# Patient Record
Sex: FEMALE | Race: WHITE | NOT HISPANIC OR LATINO | Employment: OTHER | URBAN - METROPOLITAN AREA
[De-identification: names, ages, dates, MRNs, and addresses within clinical notes are randomized per-mention and may not be internally consistent; named-entity substitution may affect disease eponyms.]

---

## 2017-03-10 DIAGNOSIS — I10 ESSENTIAL HYPERTENSION: ICD-10-CM

## 2017-03-10 DIAGNOSIS — E89.0 POSTSURGICAL HYPOTHYROIDISM: ICD-10-CM

## 2017-03-10 DIAGNOSIS — M17.0 OSTEOARTHRITIS OF BOTH KNEES, UNSPECIFIED OSTEOARTHRITIS TYPE: ICD-10-CM

## 2017-03-10 RX ORDER — LISINOPRIL AND HYDROCHLOROTHIAZIDE 20; 25 MG/1; MG/1
TABLET ORAL
Qty: 90 TABLET | Refills: 0 | Status: SHIPPED | OUTPATIENT
Start: 2017-03-10 | End: 2017-06-19 | Stop reason: SDUPTHER

## 2017-03-10 RX ORDER — LEVOTHYROXINE SODIUM 50 UG/1
TABLET ORAL
Qty: 90 TABLET | Refills: 0 | Status: SHIPPED | OUTPATIENT
Start: 2017-03-10 | End: 2017-06-19 | Stop reason: SDUPTHER

## 2017-03-10 RX ORDER — MELOXICAM 15 MG/1
TABLET ORAL
Qty: 90 TABLET | Refills: 0 | Status: SHIPPED | OUTPATIENT
Start: 2017-03-10 | End: 2017-06-19 | Stop reason: SDUPTHER

## 2017-03-10 NOTE — TELEPHONE ENCOUNTER
Advise patient she is due for fasting labs and WELLNESS EXAM - make appointment.  Patient has portal so you can advise her to make appointment on my chart for the visit.

## 2017-03-10 NOTE — TELEPHONE ENCOUNTER
I spoke with patient on phone.  Advised patient that she must follow up every 6 months for med check.  I made agreement with her that if on her visit, her blood pressure is stable and vss and physical exam normal and no signs of thyroid imbalance, etc - then we can do med check every 6 months and labs only 1 time per year as long as labs are NORMAL at that year check.  Advised patient since last labs in late July 2016 were normal - she can come just for 6 month follow up and no labs - we will do all labs in SEPT. For wellness exam.  Patient is in agreement and will make 6 month follow up appointment.

## 2017-03-10 NOTE — TELEPHONE ENCOUNTER
Spoke with pt said that she can't afford all these labs and that NP Victor Hugo said that pt could do labs once a year,pt said that she don't make a lot of money and she know insurance will cover some but not the 20% that she have to pay and she just got divorce, pt said she had just did labs in December for Victor Hugo and CHEIKH Portillo told pt she did labs in November for another doctor and last did labs for victor hugo was in July, pt said well she will have to go off of all of her medication cause she can't afford the labs. CHEIKH Portillo told pt she will sent message to victor hugo and get back with her.

## 2017-03-29 ENCOUNTER — OFFICE VISIT (OUTPATIENT)
Dept: FAMILY MEDICINE | Facility: CLINIC | Age: 62
End: 2017-03-29
Payer: COMMERCIAL

## 2017-03-29 VITALS
TEMPERATURE: 98 F | SYSTOLIC BLOOD PRESSURE: 120 MMHG | BODY MASS INDEX: 26.74 KG/M2 | HEIGHT: 68 IN | WEIGHT: 176.44 LBS | OXYGEN SATURATION: 99 % | DIASTOLIC BLOOD PRESSURE: 72 MMHG | HEART RATE: 76 BPM

## 2017-03-29 DIAGNOSIS — E78.5 HYPERLIPIDEMIA, UNSPECIFIED HYPERLIPIDEMIA TYPE: ICD-10-CM

## 2017-03-29 DIAGNOSIS — I10 ESSENTIAL HYPERTENSION: Primary | ICD-10-CM

## 2017-03-29 DIAGNOSIS — K21.9 GASTROESOPHAGEAL REFLUX DISEASE WITHOUT ESOPHAGITIS: ICD-10-CM

## 2017-03-29 DIAGNOSIS — F43.0 STRESS REACTION CAUSING MIXED DISTURBANCE OF EMOTION AND CONDUCT: ICD-10-CM

## 2017-03-29 DIAGNOSIS — E89.0 POSTSURGICAL HYPOTHYROIDISM: ICD-10-CM

## 2017-03-29 DIAGNOSIS — M17.0 OSTEOARTHRITIS OF BOTH KNEES, UNSPECIFIED OSTEOARTHRITIS TYPE: ICD-10-CM

## 2017-03-29 PROCEDURE — 99214 OFFICE O/P EST MOD 30 MIN: CPT | Mod: S$GLB,,, | Performed by: NURSE PRACTITIONER

## 2017-03-29 PROCEDURE — 3074F SYST BP LT 130 MM HG: CPT | Mod: S$GLB,,, | Performed by: NURSE PRACTITIONER

## 2017-03-29 PROCEDURE — 3078F DIAST BP <80 MM HG: CPT | Mod: S$GLB,,, | Performed by: NURSE PRACTITIONER

## 2017-03-29 PROCEDURE — 1160F RVW MEDS BY RX/DR IN RCRD: CPT | Mod: S$GLB,,, | Performed by: NURSE PRACTITIONER

## 2017-03-29 PROCEDURE — 99999 PR PBB SHADOW E&M-EST. PATIENT-LVL IV: CPT | Mod: PBBFAC,,, | Performed by: NURSE PRACTITIONER

## 2017-03-29 RX ORDER — ESTRADIOL 1 MG/1
1 TABLET ORAL DAILY
Refills: 6 | COMMUNITY
Start: 2017-03-23 | End: 2019-12-11

## 2017-03-29 NOTE — MR AVS SNAPSHOT
[facility]  [id number] [place] [address]  Gladys ODLAN [address]  Phone: [phone]  Fax: [phone]                  Gudelia Cerrato   3/29/2017 3:00 PM   Office Visit    Description:  Female : 1955   Provider:  Ya Agustin NP   Department:  [facility]           Reason for Visit     Follow-up           Diagnoses this Visit        Comments    Essential hypertension    -  Primary     Postsurgical hypothyroidism         Hyperlipidemia, unspecified hyperlipidemia type         Gastroesophageal reflux disease without esophagitis         Osteoarthritis of both knees, unspecified osteoarthritis type         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      [facility]     [facility] On Call Nurse [facility] Line - / Assistance  Registered nurses in the [facility] On Call Center provide clinical advisement, health education, appointment booking, and other advisory services.  Call for this free service at [phone].             Medications           Message regarding Medications     Verify the changes and/or additions to your medication regime listed below are the same as discussed with your clinician today.  If any of these changes or additions are incorrect, please notify your healthcare provider.        STOP taking these medications     estradiol 50 mg pellet Inject 3 Pellet (150 mg total) into the skin as directed.           Verify that the below list of medications is an accurate representation of the medications you are currently taking.  If none reported, the list may be blank. If incorrect, please contact your healthcare provider. Carry this list with you in case of emergency.           Current Medications     butalbital-acetaminophen-caffeine -40 mg (FIORICET, ESGIC)  "-40 mg per tablet Take 1 tablet by mouth every 4 (four) hours as needed.    CMPD testosterone proprionate 2% in vanicream APPLY topically THREE TIMES WEEKLY    estradiol (ESTRACE) 1 MG tablet Take 1 mg by mouth once daily.    fenofibric acid (FIBRICOR) 135 mg CpDR Take 1 capsule (135 mg total) by mouth once daily.    lisinopril-hydrochlorothiazide (PRINZIDE,ZESTORETIC) 20-25 mg Tab TAKE 1 TABLET DAILY    meloxicam (MOBIC) 15 MG tablet TAKE 1 TABLET DAILY AS NEEDED    omeprazole (PRILOSEC) 40 MG capsule Take 1 capsule (40 mg total) by mouth once daily.    rosuvastatin (CRESTOR) 10 MG tablet Take 1 tablet (10 mg total) by mouth once daily.    SYNTHROID 50 mcg tablet TAKE 1 TABLET DAILY           Clinical Reference Information           Your Vitals Were     BP Pulse Temp Height Weight SpO2    120/72 (BP Location: Right arm, Patient Position: Sitting, BP Method: Manual) 76 97.5 °F (36.4 °C) (Oral) 5' 7.75" (1.721 m) 80 kg (176 lb 6.6 oz) 99%    BMI                27.02 kg/m2          Blood Pressure          Most Recent Value    BP  120/72      Allergies as of 3/29/2017     Percocet [Oxycodone-acetaminophen]    Pravastatin      Immunizations Administered on Date of Encounter - 3/29/2017     None      Language Assistance Services     ATTENTION: Language assistance services are available, free of charge. Please call [phone].      ATENCIÓN: Si habla español, tiene a zepeda disposición servicios gratuitos de asistencia lingüística. Llame al [phone].     [facility] Ý: N?u b?n nói Ti?ng Vi?t, có các d?ch v? h? tr? ngôn ng? mi?n phí dành cho b?n. G?i s? [phone].         [facility] complies with applicable Federal civil rights laws and does not discriminate on the basis of race, color, national origin, age, disability, or sex.        "

## 2017-03-29 NOTE — PROGRESS NOTES
"Subjective:       Patient ID: Gudelia Cerrato is a 61 y.o. female.    Chief Complaint: Follow-up (6 month)    HPI Comments: Patient is here today for 6 month follow up.    Patient is going through a divorce and financially struggling at this point.  I had agreed with patient that if labs were well controlled, I would only require labs yearly and follow up every 6 month for blood pressure check and med refill.  Labs done last year in July 2016 were fine, so patient is not due for fasting labs until Next 6 month visit.    Patient has Hypertension that is controlled on present medication.  /72 (BP Location: Right arm, Patient Position: Sitting, BP Method: Manual)  Pulse 76  Temp 97.5 °F (36.4 °C) (Oral)   Ht 5' 7.75" (1.721 m)  Wt 80 kg (176 lb 6.6 oz)  SpO2 99%  BMI 27.02 kg/m2    Patient's Hyperlipidemia and Hypothyroidism levels were controlled on present med when last evaluated in July 2016.    Patient has OA that is controlled on Meloxicam and has GERD controlled on Omeprazole.    Patient does complain of increased stress/anxiety.  She reports going through divorce, building a house so living with another person, financial stressors, etc.  Discussed starting on a medication.  Patient reports that she was on Cymbalta in the past and worked well but she got off of medication because she felt she did not have enough emotions and "needed to feel".        Component      Latest Ref Rng & Units 7/21/2016   Sodium      136 - 145 mmol/L 142   Potassium      3.5 - 5.1 mmol/L 3.7   Chloride      95 - 110 mmol/L 103   CO2      23 - 29 mmol/L 28   Glucose      70 - 110 mg/dL 104   BUN, Bld      6 - 20 mg/dL 17   Creatinine      0.5 - 1.4 mg/dL 0.9   Calcium      8.7 - 10.5 mg/dL 9.4   Total Protein      6.0 - 8.4 g/dL 6.2   Albumin      3.5 - 5.2 g/dL 3.7   Total Bilirubin      0.1 - 1.0 mg/dL 0.7   Alkaline Phosphatase      55 - 135 U/L 44 (L)   AST      10 - 40 U/L 20   ALT      10 - 44 U/L 20   Anion Gap      8 " - 16 mmol/L 11   eGFR if African American      >60 mL/min/1.73 m:2 >60.0   eGFR if non African American      >60 mL/min/1.73 m:2 >60.0   Cholesterol      120 - 199 mg/dL 146   Triglycerides      30 - 150 mg/dL 78   HDL      40 - 75 mg/dL 60   LDL Cholesterol      63.0 - 159.0 mg/dL 70.4   HDL/Chol Ratio      20.0 - 50.0 % 41.1   Total Cholesterol/HDL Ratio      2.0 - 5.0 2.4   Non-HDL Cholesterol      mg/dL 86   TSH      0.400 - 4.000 uIU/mL 2.503   Free T4      0.71 - 1.51 ng/dL 0.91       Previous Medications    BUTALBITAL-ACETAMINOPHEN-CAFFEINE -40 MG (FIORICET, ESGIC) -40 MG PER TABLET    Take 1 tablet by mouth every 4 (four) hours as needed.    CMPD TESTOSTERONE PROPRIONATE 2% IN VANICREAM    APPLY topically THREE TIMES WEEKLY    ESTRADIOL (ESTRACE) 1 MG TABLET    Take 1 mg by mouth once daily.    FENOFIBRIC ACID (FIBRICOR) 135 MG CPDR    Take 1 capsule (135 mg total) by mouth once daily.    LISINOPRIL-HYDROCHLOROTHIAZIDE (PRINZIDE,ZESTORETIC) 20-25 MG TAB    TAKE 1 TABLET DAILY    MELOXICAM (MOBIC) 15 MG TABLET    TAKE 1 TABLET DAILY AS NEEDED    OMEPRAZOLE (PRILOSEC) 40 MG CAPSULE    Take 1 capsule (40 mg total) by mouth once daily.    ROSUVASTATIN (CRESTOR) 10 MG TABLET    Take 1 tablet (10 mg total) by mouth once daily.    SYNTHROID 50 MCG TABLET    TAKE 1 TABLET DAILY       Past Medical History:   Diagnosis Date    Depression     GERD (gastroesophageal reflux disease)     Hepatitis C     Hyperlipidemia     Hypertension     Postsurgical hypothyroidism     Reflux     Urge incontinence of urine        Past Surgical History:   Procedure Laterality Date    breast reduction      COLONOSCOPY  11/6/2013    + polyp - Repeat in 5 years    HYSTERECTOMY      Partial - still has ovaries    ROTATOR CUFF REPAIR      SPINE SURGERY  8/22/13    bone spur to cervical spine removed    THYROIDECTOMY Right     Partial - right lobe removed    TONSILLECTOMY         Family History   Problem Relation Age  of Onset    Colon cancer Paternal Grandmother     Colon cancer Maternal Grandmother     Cancer Maternal Grandmother      colon    Hyperlipidemia Mother     Stroke Mother 78     stents placed for carotid blockage; multiple TIAs    Hypertension Father     Stroke Father 84    Parkinsonism Maternal Grandfather      passed in his 70's    Heart disease Paternal Grandfather     Asthma Sister      in her 50's    Hypertension Brother     Hypertension Sister     Hypertension Sister     Breast cancer Neg Hx     Ovarian cancer Neg Hx        Social History     Social History    Marital status:      Spouse name: N/A    Number of children: N/A    Years of education: N/A     Social History Main Topics    Smoking status: Never Smoker    Smokeless tobacco: Never Used    Alcohol use Yes    Drug use: No    Sexual activity: Yes     Other Topics Concern    None     Social History Narrative    None       Review of Systems   Constitutional: Negative for appetite change, chills, fatigue, fever and unexpected weight change.   HENT: Negative for congestion, ear pain, mouth sores, nosebleeds, postnasal drip, rhinorrhea, sinus pressure, sneezing, sore throat, trouble swallowing and voice change.    Eyes: Negative for photophobia, pain, discharge, redness, itching and visual disturbance.   Respiratory: Negative for cough, chest tightness and shortness of breath.    Cardiovascular: Negative for chest pain, palpitations and leg swelling.   Gastrointestinal: Negative for abdominal pain, blood in stool, constipation, diarrhea, nausea and vomiting.   Genitourinary: Negative for dysuria, frequency, hematuria and urgency.   Musculoskeletal: Negative for arthralgias, back pain, joint swelling and myalgias.   Skin: Negative for color change and rash.   Allergic/Immunologic: Negative for immunocompromised state.   Neurological: Negative for dizziness, seizures, syncope, weakness and headaches.   Hematological: Negative for  "adenopathy. Does not bruise/bleed easily.   Psychiatric/Behavioral: Positive for dysphoric mood. Negative for agitation, sleep disturbance and suicidal ideas. The patient is nervous/anxious.          Objective:     Vitals:    03/29/17 1502   BP: 120/72   BP Location: Right arm   Patient Position: Sitting   BP Method: Manual   Pulse: 76   Temp: 97.5 °F (36.4 °C)   TempSrc: Oral   SpO2: 99%   Weight: 80 kg (176 lb 6.6 oz)   Height: 5' 7.75" (1.721 m)          Physical Exam   Constitutional: She is oriented to person, place, and time. She appears well-developed and well-nourished.   HENT:   Head: Normocephalic and atraumatic.   Right Ear: External ear normal.   Left Ear: External ear normal.   Nose: Nose normal.   Mouth/Throat: Oropharynx is clear and moist. No oropharyngeal exudate.   Eyes: EOM are normal. Pupils are equal, round, and reactive to light.   Neck: Normal range of motion. Neck supple. No tracheal deviation present. No thyromegaly present.   Cardiovascular: Normal rate, regular rhythm and normal heart sounds.    No murmur heard.  Pulmonary/Chest: Effort normal and breath sounds normal. No respiratory distress.   Abdominal: Soft. She exhibits no distension.   Musculoskeletal: Normal range of motion. She exhibits no edema.   Lymphadenopathy:     She has no cervical adenopathy.   Neurological: She is alert and oriented to person, place, and time. No cranial nerve deficit. Coordination normal.   Skin: Skin is warm and dry. No rash noted.   Psychiatric: She has a normal mood and affect.         Assessment:         ICD-10-CM ICD-9-CM   1. Essential hypertension I10 401.9   2. Postsurgical hypothyroidism E89.0 244.0   3. Hyperlipidemia, unspecified hyperlipidemia type E78.5 272.4   4. Gastroesophageal reflux disease without esophagitis K21.9 530.81   5. Osteoarthritis of both knees, unspecified osteoarthritis type M17.0 715.96   6. 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  Recheck in 6 months.  Will send refill request when needed - I had just sent in 3 month supply of medication.    Postsurgical hypothyroidism  -  Controlled on present medication.  Recheck in 6 months.  Will send refill request when needed - I had just sent in 3 month supply of medication.    Hyperlipidemia, unspecified hyperlipidemia type  -  Controlled on present medication.  Recheck in 6 months.  Will send refill request when needed - I had just sent in 3 month supply of medication.    Gastroesophageal reflux disease without esophagitis  -  Controlled on present medication.  Recheck in 6 months.  Will send refill request when needed - I had just sent in 3 month supply of medication.    Osteoarthritis of both knees, unspecified osteoarthritis type  -  Controlled on present medication.  Recheck in 6 months.  Will send refill request when needed - I had just sent in 3 month supply of medication.    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  Had done well on cymbalta in past.  Patient is not sure that she is ready to take another every day medication.  Wants to wait and will call me if she changes her mind.    Return in about 6 months (around 9/29/2017) for fasting labs and full wellness exam.     Patient's Medications   New Prescriptions    No medications on file   Previous Medications    BUTALBITAL-ACETAMINOPHEN-CAFFEINE -40 MG (FIORICET, ESGIC) -40 MG PER TABLET    Take 1 tablet by mouth every 4 (four) hours as needed.    CMPD TESTOSTERONE PROPRIONATE 2% IN VANICREAM    APPLY topically THREE TIMES WEEKLY    ESTRADIOL (ESTRACE) 1 MG TABLET    Take 1 mg by mouth once daily.    FENOFIBRIC ACID (FIBRICOR) 135 MG CPDR    Take 1 capsule (135 mg total) by mouth once daily.    LISINOPRIL-HYDROCHLOROTHIAZIDE (PRINZIDE,ZESTORETIC) 20-25 MG TAB    TAKE 1 TABLET DAILY     MELOXICAM (MOBIC) 15 MG TABLET    TAKE 1 TABLET DAILY AS NEEDED    OMEPRAZOLE (PRILOSEC) 40 MG CAPSULE    Take 1 capsule (40 mg total) by mouth once daily.    ROSUVASTATIN (CRESTOR) 10 MG TABLET    Take 1 tablet (10 mg total) by mouth once daily.    SYNTHROID 50 MCG TABLET    TAKE 1 TABLET DAILY   Modified Medications    No medications on file   Discontinued Medications    ESTRADIOL 50 MG PELLET    Inject 3 Pellet (150 mg total) into the skin as directed.

## 2017-06-19 ENCOUNTER — PATIENT MESSAGE (OUTPATIENT)
Dept: FAMILY MEDICINE | Facility: CLINIC | Age: 62
End: 2017-06-19

## 2017-06-19 DIAGNOSIS — M17.0 OSTEOARTHRITIS OF BOTH KNEES, UNSPECIFIED OSTEOARTHRITIS TYPE: ICD-10-CM

## 2017-06-19 DIAGNOSIS — I10 ESSENTIAL HYPERTENSION: ICD-10-CM

## 2017-06-19 DIAGNOSIS — E89.0 POSTSURGICAL HYPOTHYROIDISM: ICD-10-CM

## 2017-06-19 RX ORDER — MELOXICAM 15 MG/1
TABLET ORAL
Qty: 90 TABLET | Refills: 0 | Status: SHIPPED | OUTPATIENT
Start: 2017-06-19 | End: 2017-09-01 | Stop reason: SDUPTHER

## 2017-06-19 RX ORDER — LISINOPRIL AND HYDROCHLOROTHIAZIDE 20; 25 MG/1; MG/1
TABLET ORAL
Qty: 90 TABLET | Refills: 0 | Status: SHIPPED | OUTPATIENT
Start: 2017-06-19 | End: 2017-09-01 | Stop reason: SDUPTHER

## 2017-06-19 RX ORDER — LEVOTHYROXINE SODIUM 50 UG/1
TABLET ORAL
Qty: 90 TABLET | Refills: 0 | Status: SHIPPED | OUTPATIENT
Start: 2017-06-19 | End: 2017-09-01 | Stop reason: SDUPTHER

## 2017-09-01 DIAGNOSIS — E89.0 POSTSURGICAL HYPOTHYROIDISM: ICD-10-CM

## 2017-09-01 DIAGNOSIS — I10 ESSENTIAL HYPERTENSION: ICD-10-CM

## 2017-09-01 DIAGNOSIS — M17.0 OSTEOARTHRITIS OF BOTH KNEES, UNSPECIFIED OSTEOARTHRITIS TYPE: ICD-10-CM

## 2017-09-01 RX ORDER — MELOXICAM 15 MG/1
TABLET ORAL
Qty: 90 TABLET | Refills: 0 | Status: SHIPPED | OUTPATIENT
Start: 2017-09-01 | End: 2017-11-12 | Stop reason: SDUPTHER

## 2017-09-01 RX ORDER — LEVOTHYROXINE SODIUM 50 UG/1
TABLET ORAL
Qty: 90 TABLET | Refills: 0 | Status: SHIPPED | OUTPATIENT
Start: 2017-09-01 | End: 2017-11-12 | Stop reason: SDUPTHER

## 2017-09-01 RX ORDER — LISINOPRIL AND HYDROCHLOROTHIAZIDE 20; 25 MG/1; MG/1
TABLET ORAL
Qty: 90 TABLET | Refills: 0 | Status: SHIPPED | OUTPATIENT
Start: 2017-09-01 | End: 2017-11-12 | Stop reason: SDUPTHER

## 2017-09-01 NOTE — TELEPHONE ENCOUNTER
Sent in medications for 3 month supply - remind patient she is due for WELLNESS EXAM end of Sept/early Oct.

## 2017-09-26 ENCOUNTER — TELEPHONE (OUTPATIENT)
Dept: FAMILY MEDICINE | Facility: CLINIC | Age: 62
End: 2017-09-26

## 2017-09-26 NOTE — TELEPHONE ENCOUNTER
----- Message from Amor Barajas sent at 9/26/2017 10:24 AM CDT -----  Contact: [phone]/self  Pt requesting to speak with you about being seen on an early Wednesday morning. Pt also requesting orders be put into system before her appointment. Please call and advise.

## 2017-09-28 ENCOUNTER — LAB VISIT (OUTPATIENT)
Dept: LAB | Facility: OTHER | Age: 62
End: 2017-09-28
Attending: NURSE PRACTITIONER
Payer: COMMERCIAL

## 2017-09-28 DIAGNOSIS — E89.0 POSTSURGICAL HYPOTHYROIDISM: ICD-10-CM

## 2017-09-28 DIAGNOSIS — I10 ESSENTIAL HYPERTENSION: ICD-10-CM

## 2017-09-28 DIAGNOSIS — E78.5 HYPERLIPIDEMIA: ICD-10-CM

## 2017-09-28 LAB
ALBUMIN SERPL BCP-MCNC: 3.5 G/DL
ALP SERPL-CCNC: 55 U/L
ALT SERPL W/O P-5'-P-CCNC: 17 U/L
ANION GAP SERPL CALC-SCNC: 7 MMOL/L
AST SERPL-CCNC: 18 U/L
BASOPHILS # BLD AUTO: 0.02 K/UL
BASOPHILS NFR BLD: 0.3 %
BILIRUB SERPL-MCNC: 1 MG/DL
BUN SERPL-MCNC: 15 MG/DL
CALCIUM SERPL-MCNC: 9 MG/DL
CHLORIDE SERPL-SCNC: 103 MMOL/L
CHOLEST SERPL-MCNC: 201 MG/DL
CHOLEST/HDLC SERPL: 3.1 {RATIO}
CO2 SERPL-SCNC: 31 MMOL/L
CREAT SERPL-MCNC: 0.8 MG/DL
DIFFERENTIAL METHOD: NORMAL
EOSINOPHIL # BLD AUTO: 0.1 K/UL
EOSINOPHIL NFR BLD: 2.4 %
ERYTHROCYTE [DISTWIDTH] IN BLOOD BY AUTOMATED COUNT: 12.9 %
EST. GFR  (AFRICAN AMERICAN): >60 ML/MIN/1.73 M^2
EST. GFR  (NON AFRICAN AMERICAN): >60 ML/MIN/1.73 M^2
GLUCOSE SERPL-MCNC: 90 MG/DL
HCT VFR BLD AUTO: 43.4 %
HDLC SERPL-MCNC: 65 MG/DL
HDLC SERPL: 32.3 %
HGB BLD-MCNC: 14.9 G/DL
LDLC SERPL CALC-MCNC: 108.8 MG/DL
LYMPHOCYTES # BLD AUTO: 1.7 K/UL
LYMPHOCYTES NFR BLD: 29.7 %
MCH RBC QN AUTO: 30.7 PG
MCHC RBC AUTO-ENTMCNC: 34.3 G/DL
MCV RBC AUTO: 90 FL
MONOCYTES # BLD AUTO: 0.5 K/UL
MONOCYTES NFR BLD: 8 %
NEUTROPHILS # BLD AUTO: 3.4 K/UL
NEUTROPHILS NFR BLD: 59.3 %
NONHDLC SERPL-MCNC: 136 MG/DL
PLATELET # BLD AUTO: 248 K/UL
PMV BLD AUTO: 11.1 FL
POTASSIUM SERPL-SCNC: 3.5 MMOL/L
PROT SERPL-MCNC: 6.3 G/DL
RBC # BLD AUTO: 4.85 M/UL
SODIUM SERPL-SCNC: 141 MMOL/L
T4 FREE SERPL-MCNC: 0.95 NG/DL
TRIGL SERPL-MCNC: 136 MG/DL
TSH SERPL DL<=0.005 MIU/L-ACNC: 2.83 UIU/ML
WBC # BLD AUTO: 5.73 K/UL

## 2017-09-28 PROCEDURE — 84439 ASSAY OF FREE THYROXINE: CPT

## 2017-09-28 PROCEDURE — 36415 COLL VENOUS BLD VENIPUNCTURE: CPT

## 2017-09-28 PROCEDURE — 80053 COMPREHEN METABOLIC PANEL: CPT

## 2017-09-28 PROCEDURE — 84443 ASSAY THYROID STIM HORMONE: CPT

## 2017-09-28 PROCEDURE — 85025 COMPLETE CBC W/AUTO DIFF WBC: CPT

## 2017-09-28 PROCEDURE — 80061 LIPID PANEL: CPT

## 2017-10-04 ENCOUNTER — OFFICE VISIT (OUTPATIENT)
Dept: FAMILY MEDICINE | Facility: CLINIC | Age: 62
End: 2017-10-04
Payer: COMMERCIAL

## 2017-10-04 VITALS
TEMPERATURE: 98 F | HEIGHT: 68 IN | WEIGHT: 178.13 LBS | SYSTOLIC BLOOD PRESSURE: 136 MMHG | DIASTOLIC BLOOD PRESSURE: 86 MMHG | BODY MASS INDEX: 27 KG/M2 | HEART RATE: 71 BPM | OXYGEN SATURATION: 97 %

## 2017-10-04 DIAGNOSIS — M17.0 OSTEOARTHRITIS OF BOTH KNEES, UNSPECIFIED OSTEOARTHRITIS TYPE: ICD-10-CM

## 2017-10-04 DIAGNOSIS — Z00.00 ANNUAL PHYSICAL EXAM: Primary | ICD-10-CM

## 2017-10-04 DIAGNOSIS — I10 ESSENTIAL HYPERTENSION: ICD-10-CM

## 2017-10-04 DIAGNOSIS — M25.50 POLYARTHRALGIA: ICD-10-CM

## 2017-10-04 DIAGNOSIS — Z23 FLU VACCINE NEED: ICD-10-CM

## 2017-10-04 DIAGNOSIS — E78.5 HYPERLIPIDEMIA, UNSPECIFIED HYPERLIPIDEMIA TYPE: ICD-10-CM

## 2017-10-04 DIAGNOSIS — E89.0 POSTSURGICAL HYPOTHYROIDISM: ICD-10-CM

## 2017-10-04 PROCEDURE — 99396 PREV VISIT EST AGE 40-64: CPT | Mod: 25,S$GLB,, | Performed by: NURSE PRACTITIONER

## 2017-10-04 PROCEDURE — 90471 IMMUNIZATION ADMIN: CPT | Mod: S$GLB,,, | Performed by: NURSE PRACTITIONER

## 2017-10-04 PROCEDURE — 90686 IIV4 VACC NO PRSV 0.5 ML IM: CPT | Mod: S$GLB,,, | Performed by: NURSE PRACTITIONER

## 2017-10-04 PROCEDURE — 99999 PR PBB SHADOW E&M-EST. PATIENT-LVL IV: CPT | Mod: PBBFAC,,, | Performed by: NURSE PRACTITIONER

## 2017-10-04 NOTE — PROGRESS NOTES
"Subjective:       Patient ID: Gudelia Cerrato is a 62 y.o. female.    Chief Complaint: Annual Exam (wellness)    Patient is a 62 year old white female here today for annual physical exam with fasting lab results.    Patient has Hypertension that is controlled on present medication.  /86 (BP Location: Right arm, Patient Position: Sitting, BP Method: Medium (Manual))   Pulse 71   Temp 98 °F (36.7 °C) (Oral)   Ht 5' 7.75" (1.721 m)   Wt 80.8 kg (178 lb 2.1 oz)   SpO2 97%   BMI 27.29 kg/m²     Patient had Hyperlipidemia in past but reports she stopped taking medications over 1 year ago and levels are okay with lifestyle modifications only.    Patient has Hypothyroidism that is controlled on present medication.    Patient has OA - reports joints aches to all joints but more so to the tops of the feet.  She reports the Meloxicam definitely helps but still having pain.    Wellness Labs:  -  CBC WNL  -  CMP WNL  -  Cholesterol levels are okay on lifestyle modiifications only.  -  TSH levels controlled on present medication.    Health Maintenance:  -  Due for flu vaccine and Zoster vaccines.        Component      Latest Ref Rng & Units 9/28/2017 7/21/2016 12/15/2015   WBC      3.90 - 12.70 K/uL 5.73     RBC      4.00 - 5.40 M/uL 4.85     Hemoglobin      12.0 - 16.0 g/dL 14.9     Hematocrit      37.0 - 48.5 % 43.4     MCV      82 - 98 fL 90     MCH      27.0 - 31.0 pg 30.7     MCHC      32.0 - 36.0 g/dL 34.3     RDW      11.5 - 14.5 % 12.9     Platelets      150 - 350 K/uL 248     MPV      9.2 - 12.9 fL 11.1     Gran #      1.8 - 7.7 K/uL 3.4     Lymph #      1.0 - 4.8 K/uL 1.7     Mono #      0.3 - 1.0 K/uL 0.5     Eos #      0.0 - 0.5 K/uL 0.1     Baso #      0.00 - 0.20 K/uL 0.02     Gran%      38.0 - 73.0 % 59.3     Lymph%      18.0 - 48.0 % 29.7     Mono%      4.0 - 15.0 % 8.0     Eosinophil%      0.0 - 8.0 % 2.4     Basophil%      0.0 - 1.9 % 0.3     Differential Method       Automated     Sodium      136 - " 145 mmol/L 141 142 140   Potassium      3.5 - 5.1 mmol/L 3.5 3.7 4.1   Chloride      95 - 110 mmol/L 103 103 103   CO2      23 - 29 mmol/L 31 (H) 28 30 (H)   Glucose      70 - 110 mg/dL 90 104 94   BUN, Bld      6 - 20 mg/dL 15 17 13   Creatinine      0.5 - 1.4 mg/dL 0.8 0.9 0.9   Calcium      8.7 - 10.5 mg/dL 9.0 9.4 9.1   Total Protein      6.0 - 8.4 g/dL 6.3 6.2 6.0   Albumin      3.5 - 5.2 g/dL 3.5 3.7 3.6   Total Bilirubin      0.1 - 1.0 mg/dL 1.0 0.7 0.4   Alkaline Phosphatase      55 - 135 U/L 55 44 (L) 42 (L)   AST      10 - 40 U/L 18 20 21   ALT      10 - 44 U/L 17 20 15   Anion Gap      8 - 16 mmol/L 7 (L) 11 7 (L)   eGFR if African American      >60 mL/min/1.73 m:2 >60 >60.0 >60.0   eGFR if non African American      >60 mL/min/1.73 m:2 >60 >60.0 >60.0   Cholesterol      120 - 199 mg/dL 201 (H) 146 184   Triglycerides      30 - 150 mg/dL 136 78 99   HDL      40 - 75 mg/dL 65 60 73   LDL Cholesterol      63.0 - 159.0 mg/dL 108.8 70.4 91.2   HDL/Chol Ratio      20.0 - 50.0 % 32.3 41.1 39.7   Total Cholesterol/HDL Ratio      2.0 - 5.0 3.1 2.4 2.5   Non-HDL Cholesterol      mg/dL 136 86 111   TSH      0.400 - 4.000 uIU/mL 2.831 2.503 3.117   Free T4      0.71 - 1.51 ng/dL 0.95 0.91 1.20       Previous Medications    BUTALBITAL-ACETAMINOPHEN-CAFFEINE -40 MG (FIORICET, ESGIC) -40 MG PER TABLET    Take 1 tablet by mouth every 4 (four) hours as needed.    CMPD TESTOSTERONE PROPRIONATE 2% IN VANICREAM    APPLY topically THREE TIMES WEEKLY    ESTRADIOL (ESTRACE) 1 MG TABLET    Take 1 mg by mouth once daily.    LISINOPRIL-HYDROCHLOROTHIAZIDE (PRINZIDE,ZESTORETIC) 20-25 MG TAB    TAKE 1 TABLET DAILY    MELOXICAM (MOBIC) 15 MG TABLET    TAKE 1 TABLET DAILY AS NEEDED    SYNTHROID 50 MCG TABLET    TAKE 1 TABLET DAILY       Past Medical History:   Diagnosis Date    Depression     GERD (gastroesophageal reflux disease)     Hepatitis C     Hyperlipidemia     Hypertension     Postsurgical hypothyroidism      Reflux     Urge incontinence of urine        Past Surgical History:   Procedure Laterality Date    breast reduction      COLONOSCOPY  11/6/2013    + polyp - Repeat in 5 years    COLONOSCOPY  02/13/2017    + polyp - repeat in 5 years - Dr. Gutiérrez    HYSTERECTOMY      Partial - still has ovaries    ROTATOR CUFF REPAIR      SPINE SURGERY  8/22/13    bone spur to cervical spine removed    THYROIDECTOMY Right     Partial - right lobe removed    TONSILLECTOMY         Family History   Problem Relation Age of Onset    Colon cancer Paternal Grandmother     Colon cancer Maternal Grandmother     Cancer Maternal Grandmother      colon    Hyperlipidemia Mother     Stroke Mother 78     stents placed for carotid blockage; multiple TIAs    Hypertension Father     Stroke Father 84    Parkinsonism Maternal Grandfather      passed in his 70's    Heart disease Paternal Grandfather     Asthma Sister      in her 50's    Hypertension Brother     Hypertension Sister     Hypertension Sister     Breast cancer Neg Hx     Ovarian cancer Neg Hx        Social History     Social History    Marital status:      Spouse name: N/A    Number of children: N/A    Years of education: N/A     Social History Main Topics    Smoking status: Never Smoker    Smokeless tobacco: Never Used    Alcohol use Yes    Drug use: No    Sexual activity: Yes     Other Topics Concern    None     Social History Narrative    None       Review of Systems   Constitutional: Negative for activity change and unexpected weight change.   HENT: Positive for hearing loss and rhinorrhea. Negative for trouble swallowing.    Eyes: Negative for discharge.   Respiratory: Negative for chest tightness and wheezing.    Cardiovascular: Negative for chest pain and palpitations.   Gastrointestinal: Negative for blood in stool, constipation and vomiting.   Endocrine: Negative for polydipsia and polyuria.   Genitourinary: Negative for difficulty  "urinating, dysuria, hematuria and menstrual problem.   Musculoskeletal: Positive for arthralgias, joint swelling and neck pain.   Neurological: Positive for headaches. Negative for weakness.   Psychiatric/Behavioral: Negative for confusion and dysphoric mood.         Objective:     Vitals:    10/04/17 1057   BP: 136/86   BP Location: Right arm   Patient Position: Sitting   BP Method: Medium (Manual)   Pulse: 71   Temp: 98 °F (36.7 °C)   TempSrc: Oral   SpO2: 97%   Weight: 80.8 kg (178 lb 2.1 oz)   Height: 5' 7.75" (1.721 m)          Physical Exam   Constitutional: She is oriented to person, place, and time. She appears well-developed and well-nourished. No distress.   Body mass index is 27.29 kg/m².     HENT:   Head: Normocephalic and atraumatic.   Right Ear: External ear normal.   Left Ear: External ear normal.   Nose: Nose normal.   Mouth/Throat: Oropharynx is clear and moist. No oropharyngeal exudate.   Eyes: EOM are normal. Pupils are equal, round, and reactive to light.   Neck: Normal range of motion. Neck supple. No JVD present. No tracheal deviation present. No thyromegaly present.   Cardiovascular: Normal rate, regular rhythm and normal heart sounds.    No murmur heard.  Pulmonary/Chest: Effort normal and breath sounds normal. No stridor. No respiratory distress.   Abdominal: Soft. She exhibits no distension and no mass. There is no tenderness. There is no guarding.   Musculoskeletal: Normal range of motion. She exhibits no edema.   Lymphadenopathy:     She has no cervical adenopathy.   Neurological: She is alert and oriented to person, place, and time. No cranial nerve deficit. Coordination normal.   Skin: Skin is warm and dry. No rash noted. She is not diaphoretic.   Psychiatric: She has a normal mood and affect.         Assessment:         ICD-10-CM ICD-9-CM   1. Annual physical exam Z00.00 V70.0   2. Essential hypertension I10 401.9   3. Postsurgical hypothyroidism E89.0 244.0   4. Hyperlipidemia, " unspecified hyperlipidemia type E78.5 272.4   5. Flu vaccine need Z23 V04.81   6. Polyarthralgia M25.50 719.49   7. Osteoarthritis of both knees, unspecified osteoarthritis type M17.0 715.96       Plan:       Annual physical exam  -  Flu vaccine today  -  Shingles vaccine - sent to pharmacy next door.  Health Maintenance Summary     Zoster Vaccine Overdue 7/19/2017    Mammogram Next Due 12/30/2018     Done 12/30/2016     Done 11/2/2015    Colonoscopy Next Due 2/13/2022     Done 2/13/2017 Dr. Gutiérrez    Done 11/6/2013 repeat in 5 years   TETANUS VACCINE Next Due 3/8/2022     Done 3/8/2012 Imm Admin: Tdap   Lipid Panel Next Due 9/28/2022     Done 9/28/2017 LIPID PANEL (A)    Done 7/21/2016 LIPID PANEL    Done 12/15/2015 LIPID PANEL    Done 4/30/2015 LIPID PANEL   Influenza Vaccine Completed     Done 10/4/2017 Imm Admin: influenza - Quadrivalent - PF (ADULT)    Done 11/28/2016 Imm Admin: influenza - Quadrivalent    Done 12/15/2015 Imm Admin: influenza - Quadrivalent   Hepatitis C Screening Addressed     Not Clinically Appropriate 7/12/2016 Patient has a history of Hepatitis          Essential hypertension  -  Controlled on present medication - will send refill request when needed.  Follow up in 6 months    Postsurgical hypothyroidism  -  Controlled on present medication and dose.    Hyperlipidemia, unspecified hyperlipidemia type  -  Off all medications for 1 year and controlled on lifestyle modifications only    Polyarthralgias  -  Advised patient to first try taking Meloxicam 1/2 tablet twice daily.  If no improvement - may consider change to Naproxen 500 mg twice daily or Celebrex.  Advised patient generalized joint pains, most likely DJD but if pain persistent to the feet - then recommend seeing podiatry for evaluation.    Flu vaccine need  -     Influenza - Quadrivalent (3 years & older) (PF)      Return in about 6 months (around 4/4/2018) for med check only.     Patient's Medications   New Prescriptions    No  medications on file   Previous Medications    BUTALBITAL-ACETAMINOPHEN-CAFFEINE -40 MG (FIORICET, ESGIC) -40 MG PER TABLET    Take 1 tablet by mouth every 4 (four) hours as needed.    CMPD TESTOSTERONE PROPRIONATE 2% IN VANICREAM    APPLY topically THREE TIMES WEEKLY    ESTRADIOL (ESTRACE) 1 MG TABLET    Take 1 mg by mouth once daily.    LISINOPRIL-HYDROCHLOROTHIAZIDE (PRINZIDE,ZESTORETIC) 20-25 MG TAB    TAKE 1 TABLET DAILY    MELOXICAM (MOBIC) 15 MG TABLET    TAKE 1 TABLET DAILY AS NEEDED    SYNTHROID 50 MCG TABLET    TAKE 1 TABLET DAILY   Modified Medications    No medications on file   Discontinued Medications    CMPD TESTOSTERONE PROPRIONATE 2% IN VANICREAM    APPLY topically THREE TIMES WEEKLY    FENOFIBRIC ACID (FIBRICOR) 135 MG CPDR    Take 1 capsule (135 mg total) by mouth once daily.    OMEPRAZOLE (PRILOSEC) 40 MG CAPSULE    Take 1 capsule (40 mg total) by mouth once daily.    ROSUVASTATIN (CRESTOR) 10 MG TABLET    Take 1 tablet (10 mg total) by mouth once daily.

## 2017-11-12 DIAGNOSIS — M17.0 OSTEOARTHRITIS OF BOTH KNEES, UNSPECIFIED OSTEOARTHRITIS TYPE: ICD-10-CM

## 2017-11-12 DIAGNOSIS — E89.0 POSTSURGICAL HYPOTHYROIDISM: ICD-10-CM

## 2017-11-12 DIAGNOSIS — I10 ESSENTIAL HYPERTENSION: ICD-10-CM

## 2017-11-13 RX ORDER — LISINOPRIL AND HYDROCHLOROTHIAZIDE 20; 25 MG/1; MG/1
TABLET ORAL
Qty: 90 TABLET | Refills: 1 | Status: SHIPPED | OUTPATIENT
Start: 2017-11-13 | End: 2018-04-25 | Stop reason: SDUPTHER

## 2017-11-13 RX ORDER — MELOXICAM 15 MG/1
TABLET ORAL
Qty: 90 TABLET | Refills: 1 | Status: SHIPPED | OUTPATIENT
Start: 2017-11-13 | End: 2018-04-25 | Stop reason: SDUPTHER

## 2017-11-13 RX ORDER — LEVOTHYROXINE SODIUM 50 UG/1
TABLET ORAL
Qty: 90 TABLET | Refills: 1 | Status: SHIPPED | OUTPATIENT
Start: 2017-11-13 | End: 2018-04-25 | Stop reason: SDUPTHER

## 2018-04-25 DIAGNOSIS — E89.0 POSTSURGICAL HYPOTHYROIDISM: ICD-10-CM

## 2018-04-25 DIAGNOSIS — M17.0 OSTEOARTHRITIS OF BOTH KNEES, UNSPECIFIED OSTEOARTHRITIS TYPE: ICD-10-CM

## 2018-04-25 DIAGNOSIS — I10 ESSENTIAL HYPERTENSION: ICD-10-CM

## 2018-04-26 RX ORDER — LEVOTHYROXINE SODIUM 50 UG/1
TABLET ORAL
Qty: 90 TABLET | Refills: 0 | Status: SHIPPED | OUTPATIENT
Start: 2018-04-26 | End: 2018-08-01 | Stop reason: SDUPTHER

## 2018-04-26 RX ORDER — MELOXICAM 15 MG/1
TABLET ORAL
Qty: 90 TABLET | Refills: 0 | Status: SHIPPED | OUTPATIENT
Start: 2018-04-26 | End: 2018-08-01 | Stop reason: SDUPTHER

## 2018-04-26 RX ORDER — LISINOPRIL AND HYDROCHLOROTHIAZIDE 20; 25 MG/1; MG/1
TABLET ORAL
Qty: 90 TABLET | Refills: 0 | Status: SHIPPED | OUTPATIENT
Start: 2018-04-26 | End: 2018-06-07 | Stop reason: ALTCHOICE

## 2018-04-26 NOTE — TELEPHONE ENCOUNTER
Due for 6 month check to assess blood pressure, physical exam, etc - schedule appointment - no labs for this visit.

## 2018-04-26 NOTE — TELEPHONE ENCOUNTER
----- Message from Amor Barajas sent at 4/26/2018 12:15 PM CDT -----  Contact: [phone]/self  Pt requesting to speak with you concerning her lab orders.  Please call and advise

## 2018-05-15 ENCOUNTER — OFFICE VISIT (OUTPATIENT)
Dept: FAMILY MEDICINE | Facility: CLINIC | Age: 63
End: 2018-05-15
Payer: COMMERCIAL

## 2018-05-15 VITALS
OXYGEN SATURATION: 98 % | SYSTOLIC BLOOD PRESSURE: 140 MMHG | TEMPERATURE: 98 F | HEART RATE: 70 BPM | HEIGHT: 68 IN | WEIGHT: 184 LBS | BODY MASS INDEX: 27.89 KG/M2 | DIASTOLIC BLOOD PRESSURE: 90 MMHG

## 2018-05-15 DIAGNOSIS — I10 ESSENTIAL HYPERTENSION: Primary | ICD-10-CM

## 2018-05-15 DIAGNOSIS — E89.0 POSTSURGICAL HYPOTHYROIDISM: ICD-10-CM

## 2018-05-15 PROCEDURE — 3008F BODY MASS INDEX DOCD: CPT | Mod: CPTII,S$GLB,, | Performed by: NURSE PRACTITIONER

## 2018-05-15 PROCEDURE — 3080F DIAST BP >= 90 MM HG: CPT | Mod: CPTII,S$GLB,, | Performed by: NURSE PRACTITIONER

## 2018-05-15 PROCEDURE — 99213 OFFICE O/P EST LOW 20 MIN: CPT | Mod: S$GLB,,, | Performed by: NURSE PRACTITIONER

## 2018-05-15 PROCEDURE — 99999 PR PBB SHADOW E&M-EST. PATIENT-LVL V: CPT | Mod: PBBFAC,,, | Performed by: NURSE PRACTITIONER

## 2018-05-15 PROCEDURE — 3074F SYST BP LT 130 MM HG: CPT | Mod: CPTII,S$GLB,, | Performed by: NURSE PRACTITIONER

## 2018-05-15 RX ORDER — AMLODIPINE BESYLATE 5 MG/1
5 TABLET ORAL DAILY
Qty: 30 TABLET | Refills: 0 | Status: SHIPPED | OUTPATIENT
Start: 2018-05-15 | End: 2018-06-08 | Stop reason: SDUPTHER

## 2018-05-15 NOTE — PROGRESS NOTES
"Subjective:       Patient ID: Gudelia Cerrato is a 62 y.o. female.    Chief Complaint: Follow-up (6 month)    Patient is a 62-year-old white female with hypertension, hyperlipidemia and now controlled on lifestyle modifications, hypothyroidism, and osteoarthritis that is here today for 6 month medication check.    Patient has hypertension and is currently taken lisinopril HCT 20/25 mg daily.  Blood pressure is uncontrolled.  BP (!) 140/90   Pulse 70   Temp 97.8 °F (36.6 °C) (Oral)   Ht 5' 7.75" (1.721 m)   Wt 83.4 kg (183 lb 15.6 oz)   SpO2 98%   BMI 28.18 kg/m²     Patient has hypothyroidism that is controlled on present medication.  Patient has been stable on this medication for several years so it was agreed that we only needed blood work once a year which is done around October yearly.    Patient reports she received her shingles vaccine at [facility].  I called the pharmacy and confirmed that shingles vaccine was given.  Advised pharmacist to document immunization administration in Epic.            Previous Medications    BUTALBITAL-ACETAMINOPHEN-CAFFEINE -40 MG (FIORICET, ESGIC) -40 MG PER TABLET    Take 1 tablet by mouth every 4 (four) hours as needed.    CALCIUM CRB,CIT/D3/MIN34/NELLY (CITRACAL + BONE DENSITY ORAL)    Citracal + Bone Density    ESTRADIOL (ESTRACE) 1 MG TABLET    Take 1 mg by mouth once daily.    LISINOPRIL-HYDROCHLOROTHIAZIDE (PRINZIDE,ZESTORETIC) 20-25 MG TAB    TAKE 1 TABLET DAILY    MELOXICAM (MOBIC) 15 MG TABLET    TAKE 1 TABLET DAILY AS NEEDED    MULTIVITAMIN/IRON/FOLIC ACID (CENTRUM WOMEN ORAL)    Centrum    SYNTHROID 50 MCG TABLET    TAKE 1 TABLET DAILY       Past Medical History:   Diagnosis Date    Depression     GERD (gastroesophageal reflux disease)     Hepatitis C     Hyperlipidemia     Hypertension     Postsurgical hypothyroidism     Reflux     Urge incontinence of urine        Past Surgical History:   Procedure Laterality Date    " breast reduction      COLONOSCOPY  11/6/2013    + polyp - Repeat in 5 years    COLONOSCOPY  02/13/2017    + polyp - repeat in 5 years - Dr. Gutiérrez    HYSTERECTOMY      Partial - still has ovaries    ROTATOR CUFF REPAIR      SPINE SURGERY  8/22/13    bone spur to cervical spine removed    THYROIDECTOMY Right     Partial - right lobe removed    TONSILLECTOMY         Family History   Problem Relation Age of Onset    Colon cancer Paternal Grandmother     Colon cancer Maternal Grandmother     Cancer Maternal Grandmother         colon    Hyperlipidemia Mother     Stroke Mother 78        stents placed for carotid blockage; multiple TIAs    Hypertension Father     Stroke Father 84    Parkinsonism Maternal Grandfather         passed in his 70's    Heart disease Paternal Grandfather     Asthma Sister         in her 50's    Hypertension Brother     Hypertension Sister     Hypertension Sister     Breast cancer Neg Hx     Ovarian cancer Neg Hx        Social History     Social History    Marital status:      Spouse name: N/A    Number of children: N/A    Years of education: N/A     Social History Main Topics    Smoking status: Never Smoker    Smokeless tobacco: Never Used    Alcohol use Yes    Drug use: No    Sexual activity: Yes     Other Topics Concern    None     Social History Narrative    None       Review of Systems   Constitutional: Negative for appetite change, chills, fatigue, fever and unexpected weight change.   HENT: Negative for congestion, ear pain, mouth sores, nosebleeds, postnasal drip, rhinorrhea, sinus pressure, sneezing, sore throat, trouble swallowing and voice change.    Eyes: Negative for photophobia, pain, discharge, redness, itching and visual disturbance.   Respiratory: Negative for cough, chest tightness and shortness of breath.    Cardiovascular: Negative for chest pain, palpitations and leg swelling.   Gastrointestinal: Negative for abdominal pain, blood in  "stool, constipation, diarrhea, nausea and vomiting.   Genitourinary: Negative for dysuria, frequency, hematuria and urgency.   Musculoskeletal: Negative for arthralgias, back pain, joint swelling and myalgias.   Skin: Negative for color change and rash.   Allergic/Immunologic: Negative for immunocompromised state.   Neurological: Negative for dizziness, seizures, syncope, weakness and headaches.   Hematological: Negative for adenopathy. Does not bruise/bleed easily.   Psychiatric/Behavioral: Negative for agitation, dysphoric mood, sleep disturbance and suicidal ideas. The patient is not nervous/anxious.          Objective:     Vitals:    05/15/18 0842 05/15/18 0900   BP: (!) 136/90 (!) 140/90   BP Location: Left arm    Patient Position: Sitting    BP Method: Medium (Manual)    Pulse: 70    Temp: 97.8 °F (36.6 °C)    TempSrc: Oral    SpO2: 98%    Weight: 83.4 kg (183 lb 15.6 oz)    Height: 5' 7.75" (1.721 m)           Physical Exam   Constitutional: She is oriented to person, place, and time. She appears well-developed and well-nourished. No distress.   Body mass index is 28.18 kg/m².       HENT:   Head: Normocephalic and atraumatic.   Right Ear: External ear normal.   Left Ear: External ear normal.   Nose: Nose normal.   Mouth/Throat: Oropharynx is clear and moist. No oropharyngeal exudate.   Eyes: EOM are normal. Pupils are equal, round, and reactive to light.   Neck: Normal range of motion. Neck supple. No JVD present. No tracheal deviation present. No thyromegaly present.   Cardiovascular: Normal rate, regular rhythm and normal heart sounds.    No murmur heard.  Pulmonary/Chest: Effort normal and breath sounds normal. No stridor. No respiratory distress.   Abdominal: Soft. She exhibits no distension and no mass. There is no tenderness. There is no guarding.   Musculoskeletal: Normal range of motion. She exhibits no edema.   Lymphadenopathy:     She has no cervical adenopathy.   Neurological: She is alert and " oriented to person, place, and time. No cranial nerve deficit. Coordination normal.   Skin: Skin is warm and dry. No rash noted. She is not diaphoretic.   Psychiatric: She has a normal mood and affect.         Assessment:         ICD-10-CM ICD-9-CM   1. Essential hypertension I10 401.9   2. Postsurgical hypothyroidism E89.0 244.0       Plan:       Essential hypertension  -  Start him load P5 milligrams daily in a.m. continue lisinopril HCT at present dose.  Follow-up in 3 weeks for blood pressure check.  -     amLODIPine (NORVASC) 5 MG tablet; Take 1 tablet (5 mg total) by mouth once daily.  Dispense: 30 tablet; Refill: 0    Postsurgical hypothyroidism  -  Continue medication at present dose.  Will check thyroid labs in 6 months with wellness exam.          Follow-up in about 3 weeks (around 6/5/2018) for blood pressure check.     Patient's Medications   New Prescriptions    AMLODIPINE (NORVASC) 5 MG TABLET    Take 1 tablet (5 mg total) by mouth once daily.   Previous Medications    BUTALBITAL-ACETAMINOPHEN-CAFFEINE -40 MG (FIORICET, ESGIC) -40 MG PER TABLET    Take 1 tablet by mouth every 4 (four) hours as needed.    CALCIUM CRB,CIT/D3/MIN34/NELLY (CITRACAL + BONE DENSITY ORAL)    Citracal + Bone Density    ESTRADIOL (ESTRACE) 1 MG TABLET    Take 1 mg by mouth once daily.    LISINOPRIL-HYDROCHLOROTHIAZIDE (PRINZIDE,ZESTORETIC) 20-25 MG TAB    TAKE 1 TABLET DAILY    MELOXICAM (MOBIC) 15 MG TABLET    TAKE 1 TABLET DAILY AS NEEDED    MULTIVITAMIN/IRON/FOLIC ACID (CENTRUM WOMEN ORAL)    Centrum    SYNTHROID 50 MCG TABLET    TAKE 1 TABLET DAILY   Modified Medications    No medications on file   Discontinued Medications    CMPD TESTOSTERONE PROPRIONATE 2% IN VANICREAM    APPLY topically THREE TIMES WEEKLY

## 2018-06-07 ENCOUNTER — OFFICE VISIT (OUTPATIENT)
Dept: FAMILY MEDICINE | Facility: CLINIC | Age: 63
End: 2018-06-07
Payer: COMMERCIAL

## 2018-06-07 VITALS
TEMPERATURE: 99 F | DIASTOLIC BLOOD PRESSURE: 82 MMHG | BODY MASS INDEX: 27.74 KG/M2 | RESPIRATION RATE: 18 BRPM | WEIGHT: 183 LBS | SYSTOLIC BLOOD PRESSURE: 130 MMHG | HEIGHT: 68 IN | HEART RATE: 75 BPM | OXYGEN SATURATION: 100 %

## 2018-06-07 DIAGNOSIS — I10 ESSENTIAL HYPERTENSION: Primary | ICD-10-CM

## 2018-06-07 PROCEDURE — 3079F DIAST BP 80-89 MM HG: CPT | Mod: CPTII,S$GLB,, | Performed by: NURSE PRACTITIONER

## 2018-06-07 PROCEDURE — 3008F BODY MASS INDEX DOCD: CPT | Mod: CPTII,S$GLB,, | Performed by: NURSE PRACTITIONER

## 2018-06-07 PROCEDURE — 99213 OFFICE O/P EST LOW 20 MIN: CPT | Mod: S$GLB,,, | Performed by: NURSE PRACTITIONER

## 2018-06-07 PROCEDURE — 3075F SYST BP GE 130 - 139MM HG: CPT | Mod: CPTII,S$GLB,, | Performed by: NURSE PRACTITIONER

## 2018-06-07 PROCEDURE — 99999 PR PBB SHADOW E&M-EST. PATIENT-LVL IV: CPT | Mod: PBBFAC,,, | Performed by: NURSE PRACTITIONER

## 2018-06-07 RX ORDER — VALSARTAN AND HYDROCHLOROTHIAZIDE 160; 25 MG/1; MG/1
1 TABLET ORAL DAILY
Qty: 30 TABLET | Refills: 1 | Status: SHIPPED | OUTPATIENT
Start: 2018-06-07 | End: 2018-08-01 | Stop reason: SDUPTHER

## 2018-06-07 NOTE — PROGRESS NOTES
"Subjective:       Patient ID: Gudelia Cerrato is a 62 y.o. female.    Chief Complaint: Follow-up    Patient is a 62-year-old white female with hypertension, hyperlipidemia that is now controlled on lifestyle modifications, hypothyroidism, and osteoarthritis that is here today for for 3 week follow up to recheck blood pressure.      Patient was seen on 5/15/18 for her 6 month follow up appointment.  Patient has Hypertension and was taking Lisinopril HCT 20/25 mg daily and blood pressure was uncontrolled.  I added on Amlodipine 5 mg daily and blood pressure is improved.  /82   Pulse 75   Temp 98.5 °F (36.9 °C)   Resp 18   Ht 5' 8" (1.727 m)   Wt 83 kg (183 lb)   SpO2 100%   BMI 27.83 kg/m²   Patient reports that she experiences chronic fatigue with intermittent dizziness and read reports that Lisinopril could be causing these symptoms and would like to try a different medication.             Previous Medications    AMLODIPINE (NORVASC) 5 MG TABLET    Take 1 tablet (5 mg total) by mouth once daily.    BUTALBITAL-ACETAMINOPHEN-CAFFEINE -40 MG (FIORICET, ESGIC) -40 MG PER TABLET    Take 1 tablet by mouth every 4 (four) hours as needed.    CALCIUM CRB,CIT/D3/MIN34/NELLY (CITRACAL + BONE DENSITY ORAL)    Citracal + Bone Density    ESTRADIOL (ESTRACE) 1 MG TABLET    Take 1 mg by mouth once daily.    LISINOPRIL-HYDROCHLOROTHIAZIDE (PRINZIDE,ZESTORETIC) 20-25 MG TAB    TAKE 1 TABLET DAILY    MELOXICAM (MOBIC) 15 MG TABLET    TAKE 1 TABLET DAILY AS NEEDED    MULTIVITAMIN/IRON/FOLIC ACID (CENTRUM WOMEN ORAL)    Centrum    SYNTHROID 50 MCG TABLET    TAKE 1 TABLET DAILY       Past Medical History:   Diagnosis Date    Depression     GERD (gastroesophageal reflux disease)     Hepatitis C     Hyperlipidemia     Hypertension     Postsurgical hypothyroidism     Reflux     Urge incontinence of urine        Past Surgical History:   Procedure Laterality Date    breast reduction      COLONOSCOPY  " 11/6/2013    + polyp - Repeat in 5 years    COLONOSCOPY  02/13/2017    + polyp - repeat in 5 years - Dr. Gutiérrez    HYSTERECTOMY      Partial - still has ovaries    ROTATOR CUFF REPAIR      SPINE SURGERY  8/22/13    bone spur to cervical spine removed    THYROIDECTOMY Right     Partial - right lobe removed    TONSILLECTOMY         Family History   Problem Relation Age of Onset    Colon cancer Paternal Grandmother     Colon cancer Maternal Grandmother     Cancer Maternal Grandmother         colon    Hyperlipidemia Mother     Stroke Mother 78        stents placed for carotid blockage; multiple TIAs    Hypertension Father     Stroke Father 84    Parkinsonism Maternal Grandfather         passed in his 70's    Heart disease Paternal Grandfather     Asthma Sister         in her 50's    Hypertension Brother     Hypertension Sister     Hypertension Sister     Breast cancer Neg Hx     Ovarian cancer Neg Hx        Social History     Social History    Marital status:      Spouse name: N/A    Number of children: N/A    Years of education: N/A     Social History Main Topics    Smoking status: Never Smoker    Smokeless tobacco: Never Used    Alcohol use Yes    Drug use: No    Sexual activity: Yes     Other Topics Concern    None     Social History Narrative    None       Review of Systems   Constitutional: Positive for fatigue. Negative for appetite change, chills, fever and unexpected weight change.   HENT: Negative for congestion, ear pain, mouth sores, nosebleeds, postnasal drip, rhinorrhea, sinus pressure, sneezing, sore throat, trouble swallowing and voice change.    Eyes: Negative for photophobia, pain, discharge, redness, itching and visual disturbance.   Respiratory: Negative for cough, chest tightness and shortness of breath.    Cardiovascular: Negative for chest pain, palpitations and leg swelling.   Gastrointestinal: Negative for abdominal pain, blood in stool, constipation,  "diarrhea, nausea and vomiting.   Genitourinary: Negative for dysuria, frequency, hematuria and urgency.   Musculoskeletal: Negative for arthralgias, back pain, joint swelling and myalgias.   Skin: Negative for color change and rash.   Allergic/Immunologic: Negative for immunocompromised state.   Neurological: Positive for dizziness and headaches. Negative for seizures, syncope and weakness.   Hematological: Negative for adenopathy. Does not bruise/bleed easily.   Psychiatric/Behavioral: Negative for agitation, dysphoric mood, sleep disturbance and suicidal ideas. The patient is not nervous/anxious.          Objective:     Vitals:    06/07/18 1532   BP: 130/82   Pulse: 75   Resp: 18   Temp: 98.5 °F (36.9 °C)   SpO2: 100%   Weight: 83 kg (183 lb)   Height: 5' 8" (1.727 m)          Physical Exam   Constitutional: She is oriented to person, place, and time. She appears well-developed and well-nourished. No distress.   Body mass index is 27.83 kg/m².         HENT:   Head: Normocephalic and atraumatic.   Right Ear: External ear normal.   Left Ear: External ear normal.   Nose: Nose normal.   Mouth/Throat: Oropharynx is clear and moist. No oropharyngeal exudate.   Eyes: EOM are normal. Pupils are equal, round, and reactive to light.   Neck: Normal range of motion. Neck supple. No JVD present. No tracheal deviation present. No thyromegaly present.   Cardiovascular: Normal rate, regular rhythm and normal heart sounds.    No murmur heard.  Pulmonary/Chest: Effort normal and breath sounds normal. No stridor. No respiratory distress.   Abdominal: Soft. She exhibits no distension and no mass. There is no tenderness. There is no guarding.   Musculoskeletal: Normal range of motion. She exhibits no edema.   Lymphadenopathy:     She has no cervical adenopathy.   Neurological: She is alert and oriented to person, place, and time. No cranial nerve deficit. Coordination normal.   Skin: Skin is warm and dry. No rash noted. She is not " diaphoretic.   Psychiatric: She has a normal mood and affect.         Assessment:         ICD-10-CM ICD-9-CM   1. Essential hypertension I10 401.9       Plan:       Essential hypertension  -  Stop the Lisinopril HCT and change to Diovan /25 mg daily and continue the Amlodipine 5 mg daily.  Return to office in 6 weeks for blood pressure recheck.  -     valsartan-hydrochlorothiazide (DIOVAN-HCT) 160-25 mg per tablet; Take 1 tablet by mouth once daily.  Dispense: 30 tablet; Refill: 1      Follow-up in about 6 weeks (around 7/19/2018) for blood pressure check.     Patient's Medications   New Prescriptions    VALSARTAN-HYDROCHLOROTHIAZIDE (DIOVAN-HCT) 160-25 MG PER TABLET    Take 1 tablet by mouth once daily.   Previous Medications    AMLODIPINE (NORVASC) 5 MG TABLET    Take 1 tablet (5 mg total) by mouth once daily.    BUTALBITAL-ACETAMINOPHEN-CAFFEINE -40 MG (FIORICET, ESGIC) -40 MG PER TABLET    Take 1 tablet by mouth every 4 (four) hours as needed.    CALCIUM CRB,CIT/D3/MIN34/NELLY (CITRACAL + BONE DENSITY ORAL)    Citracal + Bone Density    ESTRADIOL (ESTRACE) 1 MG TABLET    Take 1 mg by mouth once daily.    MELOXICAM (MOBIC) 15 MG TABLET    TAKE 1 TABLET DAILY AS NEEDED    MULTIVITAMIN/IRON/FOLIC ACID (CENTRUM WOMEN ORAL)    Centrum    SYNTHROID 50 MCG TABLET    TAKE 1 TABLET DAILY   Modified Medications    No medications on file   Discontinued Medications    LISINOPRIL-HYDROCHLOROTHIAZIDE (PRINZIDE,ZESTORETIC) 20-25 MG TAB    TAKE 1 TABLET DAILY

## 2018-06-08 RX ORDER — AMLODIPINE BESYLATE 5 MG/1
5 TABLET ORAL DAILY
Qty: 30 TABLET | Refills: 1 | Status: SHIPPED | OUTPATIENT
Start: 2018-06-08 | End: 2018-08-01 | Stop reason: SDUPTHER

## 2018-08-01 DIAGNOSIS — I10 ESSENTIAL HYPERTENSION: Primary | ICD-10-CM

## 2018-08-01 DIAGNOSIS — M17.0 OSTEOARTHRITIS OF BOTH KNEES, UNSPECIFIED OSTEOARTHRITIS TYPE: ICD-10-CM

## 2018-08-01 DIAGNOSIS — E89.0 POSTSURGICAL HYPOTHYROIDISM: ICD-10-CM

## 2018-08-01 RX ORDER — AMLODIPINE BESYLATE 5 MG/1
5 TABLET ORAL DAILY
Qty: 30 TABLET | Refills: 0 | Status: SHIPPED | OUTPATIENT
Start: 2018-08-01 | End: 2018-09-04 | Stop reason: SDUPTHER

## 2018-08-01 RX ORDER — VALSARTAN AND HYDROCHLOROTHIAZIDE 160; 25 MG/1; MG/1
1 TABLET ORAL DAILY
Qty: 30 TABLET | Refills: 0 | Status: SHIPPED | OUTPATIENT
Start: 2018-08-01 | End: 2018-09-04 | Stop reason: SDUPTHER

## 2018-08-01 NOTE — TELEPHONE ENCOUNTER
----- Message from Jaci Jay sent at 8/1/2018  2:41 PM CDT -----  Called patient to reschedule appointment, says will be out of medicine; if Ya could refill until her appt. 08/22/18/ valsartan-hydrochlorothiazide (DIOVAN-HCT) 160-25 mg ,amLODIPine (NORVASC) 5 MG . Please call her # [phone]. thanks

## 2018-08-02 RX ORDER — MELOXICAM 15 MG/1
TABLET ORAL
Qty: 30 TABLET | Refills: 0 | Status: SHIPPED | OUTPATIENT
Start: 2018-08-02 | End: 2018-10-10 | Stop reason: SDUPTHER

## 2018-08-02 RX ORDER — LEVOTHYROXINE SODIUM 50 UG/1
TABLET ORAL
Qty: 90 TABLET | Refills: 0 | Status: SHIPPED | OUTPATIENT
Start: 2018-08-02 | End: 2018-10-10 | Stop reason: SDUPTHER

## 2018-08-10 ENCOUNTER — PATIENT MESSAGE (OUTPATIENT)
Dept: FAMILY MEDICINE | Facility: CLINIC | Age: 63
End: 2018-08-10

## 2018-09-04 DIAGNOSIS — I10 ESSENTIAL HYPERTENSION: ICD-10-CM

## 2018-09-04 RX ORDER — VALSARTAN AND HYDROCHLOROTHIAZIDE 160; 25 MG/1; MG/1
1 TABLET ORAL DAILY
Qty: 30 TABLET | Refills: 0 | Status: SHIPPED | OUTPATIENT
Start: 2018-09-04 | End: 2018-10-10 | Stop reason: SDUPTHER

## 2018-09-04 RX ORDER — AMLODIPINE BESYLATE 5 MG/1
5 TABLET ORAL DAILY
Qty: 30 TABLET | Refills: 0 | Status: SHIPPED | OUTPATIENT
Start: 2018-09-04 | End: 2018-10-10 | Stop reason: SDUPTHER

## 2018-10-10 ENCOUNTER — TELEPHONE (OUTPATIENT)
Dept: FAMILY MEDICINE | Facility: CLINIC | Age: 63
End: 2018-10-10

## 2018-10-10 ENCOUNTER — OFFICE VISIT (OUTPATIENT)
Dept: FAMILY MEDICINE | Facility: CLINIC | Age: 63
End: 2018-10-10
Payer: COMMERCIAL

## 2018-10-10 VITALS
HEIGHT: 69 IN | HEART RATE: 71 BPM | SYSTOLIC BLOOD PRESSURE: 130 MMHG | TEMPERATURE: 98 F | WEIGHT: 188.63 LBS | DIASTOLIC BLOOD PRESSURE: 80 MMHG | BODY MASS INDEX: 27.94 KG/M2 | OXYGEN SATURATION: 98 %

## 2018-10-10 DIAGNOSIS — Z86.19 HISTORY OF HEPATITIS C: ICD-10-CM

## 2018-10-10 DIAGNOSIS — I10 ESSENTIAL HYPERTENSION: Primary | ICD-10-CM

## 2018-10-10 DIAGNOSIS — M17.0 OSTEOARTHRITIS OF BOTH KNEES, UNSPECIFIED OSTEOARTHRITIS TYPE: ICD-10-CM

## 2018-10-10 DIAGNOSIS — E89.0 POSTSURGICAL HYPOTHYROIDISM: ICD-10-CM

## 2018-10-10 DIAGNOSIS — F43.0 STRESS REACTION CAUSING MIXED DISTURBANCE OF EMOTION AND CONDUCT: ICD-10-CM

## 2018-10-10 DIAGNOSIS — Z23 FLU VACCINE NEED: ICD-10-CM

## 2018-10-10 DIAGNOSIS — Z79.890 HORMONE REPLACEMENT THERAPY (HRT): ICD-10-CM

## 2018-10-10 DIAGNOSIS — M25.50 POLYARTHRALGIA: ICD-10-CM

## 2018-10-10 DIAGNOSIS — E78.5 HYPERLIPIDEMIA, UNSPECIFIED HYPERLIPIDEMIA TYPE: ICD-10-CM

## 2018-10-10 DIAGNOSIS — Z13.1 DIABETES MELLITUS SCREENING: ICD-10-CM

## 2018-10-10 DIAGNOSIS — Z13.0 SCREENING FOR DEFICIENCY ANEMIA: ICD-10-CM

## 2018-10-10 PROCEDURE — 99999 PR PBB SHADOW E&M-EST. PATIENT-LVL IV: CPT | Mod: PBBFAC,,, | Performed by: NURSE PRACTITIONER

## 2018-10-10 PROCEDURE — 3075F SYST BP GE 130 - 139MM HG: CPT | Mod: CPTII,S$GLB,, | Performed by: NURSE PRACTITIONER

## 2018-10-10 PROCEDURE — 3008F BODY MASS INDEX DOCD: CPT | Mod: CPTII,S$GLB,, | Performed by: NURSE PRACTITIONER

## 2018-10-10 PROCEDURE — 90471 IMMUNIZATION ADMIN: CPT | Mod: S$GLB,,, | Performed by: NURSE PRACTITIONER

## 2018-10-10 PROCEDURE — 99214 OFFICE O/P EST MOD 30 MIN: CPT | Mod: 25,S$GLB,, | Performed by: NURSE PRACTITIONER

## 2018-10-10 PROCEDURE — 3079F DIAST BP 80-89 MM HG: CPT | Mod: CPTII,S$GLB,, | Performed by: NURSE PRACTITIONER

## 2018-10-10 PROCEDURE — 90686 IIV4 VACC NO PRSV 0.5 ML IM: CPT | Mod: S$GLB,,, | Performed by: NURSE PRACTITIONER

## 2018-10-10 RX ORDER — DULOXETIN HYDROCHLORIDE 30 MG/1
CAPSULE, DELAYED RELEASE ORAL
Qty: 60 CAPSULE | Refills: 1 | Status: SHIPPED | OUTPATIENT
Start: 2018-10-10 | End: 2018-11-20 | Stop reason: DRUGHIGH

## 2018-10-10 RX ORDER — AMLODIPINE BESYLATE 5 MG/1
5 TABLET ORAL DAILY
Qty: 90 TABLET | Refills: 0 | Status: SHIPPED | OUTPATIENT
Start: 2018-10-10 | End: 2019-01-03 | Stop reason: SDUPTHER

## 2018-10-10 RX ORDER — DULOXETINE HYDROCHLORIDE 30 MG/1
CAPSULE, DELAYED RELEASE ORAL
Qty: 60 CAPSULE | Refills: 1 | Status: SHIPPED | OUTPATIENT
Start: 2018-10-10 | End: 2018-10-10 | Stop reason: SDUPTHER

## 2018-10-10 RX ORDER — LEVOTHYROXINE SODIUM 50 UG/1
50 TABLET ORAL DAILY
Qty: 90 TABLET | Refills: 0 | Status: SHIPPED | OUTPATIENT
Start: 2018-10-10 | End: 2019-01-03 | Stop reason: SDUPTHER

## 2018-10-10 RX ORDER — VALSARTAN AND HYDROCHLOROTHIAZIDE 160; 25 MG/1; MG/1
1 TABLET ORAL DAILY
Qty: 90 TABLET | Refills: 0 | Status: SHIPPED | OUTPATIENT
Start: 2018-10-10 | End: 2019-01-03 | Stop reason: SDUPTHER

## 2018-10-10 RX ORDER — MELOXICAM 15 MG/1
15 TABLET ORAL DAILY PRN
Qty: 90 TABLET | Refills: 0 | Status: SHIPPED | OUTPATIENT
Start: 2018-10-10 | End: 2019-04-02 | Stop reason: SDUPTHER

## 2018-10-10 NOTE — PROGRESS NOTES
"Subjective:       Patient ID: Gudelia Cerrato is a 63 y.o. female.    Chief Complaint: Hypertension    Patient is a 62-year-old white female with hypertension, hyperlipidemia that is now controlled on lifestyle modifications, hypothyroidism, history of hepatitis-C, on hormone replacement therapy, and osteoarthritis that is here today for blood pressure check and wanting to get back on Cymbalta.     Patient has hypertension and was taking lisinopril HCT 20/20 5 mg daily with amlodipine 5 mg daily in June 2018 but complained of intermittent dizziness and chronic fatigue that could be secondary to the lisinopril so patient was changed to valsartan /25 mg daily and continue the amlodipine 5 mg daily and blood pressure is well controlled.  /80   Pulse 71   Temp 97.5 °F (36.4 °C) (Oral)   Ht 5' 9" (1.753 m)   Wt 85.6 kg (188 lb 9.7 oz)   SpO2 98%   BMI 27.85 kg/m²     Patient has hyperlipidemia that has been controlled on lifestyle modifications in the past but is due for repeat labs.  Will get repeat labs and wellness exam when patient follows up in 6-8 weeks.      Patient has postsurgical hypothyroidism and takes brand Synthroid 50 mcg daily and overdue for thyroid labs.  We will get thyroid labs before next visit in 6-8 weeks to check thyroid labs.    Patient is here today wanting to get back on Cymbalta.  Patient has a history of depression back in 2015 while getting the divorce and was on Cymbalta 60 mg daily.  Patient had reported that when Cymbalta changed to the generic it was no longer effective so she required the brand Cymbalta back in 2015 and was  able to get off the medicine around 2016.  Patient reports right now she is having increased stress/anxiety/and depressed mood due to family stressors daily with parents Health conditions.  And she also reports polyarthralgias that she finds the Cymbalta helped with chronic musculoskeletal pain as well as mood so would like to get back on the " Cymbalta.      Patient has osteoarthritis in the knees and takes meloxicam only as needed.    Patient is on hormone replacement therapy by her gyn MD.      Hypertension   This is a chronic problem. The current episode started more than 1 year ago. The problem is unchanged. The problem is controlled. Associated symptoms include anxiety, blurred vision, headaches and malaise/fatigue. Pertinent negatives include no chest pain, neck pain, orthopnea, palpitations, peripheral edema, PND, shortness of breath or sweats. Agents associated with hypertension include decongestants, estrogens and thyroid hormones. Risk factors for coronary artery disease include post-menopausal state and stress. Past treatments include diuretics and lifestyle changes. The current treatment provides mild improvement. Compliance problems include exercise.        Current Outpatient Medications   Medication Sig Dispense Refill    amLODIPine (NORVASC) 5 MG tablet Take 1 tablet (5 mg total) by mouth once daily. 30 tablet 0    butalbital-acetaminophen-caffeine -40 mg (FIORICET, ESGIC) -40 mg per tablet Take 1 tablet by mouth every 4 (four) hours as needed.  2    calcium crb,cit/D3/min34/nancy (CITRACAL + BONE DENSITY ORAL) Citracal + Bone Density      estradiol (ESTRACE) 1 MG tablet Take 1 mg by mouth once daily.  6    meloxicam (MOBIC) 15 MG tablet TAKE 1 TABLET DAILY AS NEEDED 30 tablet 0    multivitamin/iron/folic acid (CENTRUM WOMEN ORAL) Centrum      SYNTHROID 50 mcg tablet TAKE 1 TABLET DAILY 90 tablet 0    valsartan-hydrochlorothiazide (DIOVAN-HCT) 160-25 mg per tablet Take 1 tablet by mouth once daily. 30 tablet 0     No current facility-administered medications for this visit.        Past Medical History:   Diagnosis Date    Depression     GERD (gastroesophageal reflux disease)     Hepatitis C     Hyperlipidemia     Hypertension     Postsurgical hypothyroidism     Reflux     Urge incontinence of urine        Past  Surgical History:   Procedure Laterality Date    breast reduction      COLONOSCOPY  11/6/2013    + polyp - Repeat in 5 years    COLONOSCOPY  02/13/2017    + polyp - repeat in 5 years - Dr. Gutiérrez    HYSTERECTOMY      Partial - still has ovaries    ROTATOR CUFF REPAIR      SPINE SURGERY  8/22/13    bone spur to cervical spine removed    THYROIDECTOMY Right     Partial - right lobe removed    TONSILLECTOMY         Family History   Problem Relation Age of Onset    Colon cancer Paternal Grandmother     Colon cancer Maternal Grandmother     Cancer Maternal Grandmother         colon    Hyperlipidemia Mother     Stroke Mother 78        stents placed for carotid blockage; multiple TIAs    Hypertension Father     Stroke Father 84    Parkinsonism Maternal Grandfather         passed in his 70's    Heart disease Paternal Grandfather     Asthma Sister         in her 50's    Hypertension Brother     Hypertension Sister     Hypertension Sister     Breast cancer Neg Hx     Ovarian cancer Neg Hx        Social History     Socioeconomic History    Marital status:      Spouse name: None    Number of children: None    Years of education: None    Highest education level: None   Social Needs    Financial resource strain: None    Food insecurity - worry: None    Food insecurity - inability: None    Transportation needs - medical: None    Transportation needs - non-medical: None   Occupational History    None   Tobacco Use    Smoking status: Never Smoker    Smokeless tobacco: Never Used   Substance and Sexual Activity    Alcohol use: Yes    Drug use: No    Sexual activity: Yes   Other Topics Concern    None   Social History Narrative    None       Review of Systems   Constitutional: Positive for malaise/fatigue. Negative for appetite change, chills, fatigue, fever and unexpected weight change.   HENT: Negative for congestion, ear pain, mouth sores, nosebleeds, postnasal drip, rhinorrhea,  "sinus pressure, sneezing, sore throat, trouble swallowing and voice change.    Eyes: Positive for blurred vision. Negative for photophobia, pain, discharge, redness, itching and visual disturbance.   Respiratory: Negative for cough, chest tightness and shortness of breath.    Cardiovascular: Negative for chest pain, palpitations, orthopnea, leg swelling and PND.   Gastrointestinal: Negative for abdominal pain, blood in stool, constipation, diarrhea, nausea and vomiting.   Genitourinary: Negative for dysuria, frequency, hematuria and urgency.   Musculoskeletal: Positive for arthralgias and myalgias. Negative for back pain, joint swelling and neck pain.   Skin: Negative for color change and rash.   Allergic/Immunologic: Negative for immunocompromised state.   Neurological: Positive for headaches. Negative for dizziness, seizures, syncope and weakness.   Hematological: Negative for adenopathy. Does not bruise/bleed easily.   Psychiatric/Behavioral: Positive for dysphoric mood. Negative for agitation, sleep disturbance and suicidal ideas. The patient is nervous/anxious.          Objective:     Vitals:    10/10/18 1056 10/10/18 1125   BP: 139/78 130/80   BP Location: Left arm    Patient Position: Sitting    BP Method: Medium (Manual)    Pulse: 71    Temp: 97.5 °F (36.4 °C)    TempSrc: Oral    SpO2: 98%    Weight: 85.6 kg (188 lb 9.7 oz)    Height: 5' 9" (1.753 m)           Physical Exam   Constitutional: She is oriented to person, place, and time. She appears well-developed and well-nourished. No distress.   Body mass index is 27.85 kg/m².           HENT:   Head: Normocephalic and atraumatic.   Right Ear: External ear normal.   Left Ear: External ear normal.   Nose: Nose normal.   Mouth/Throat: Oropharynx is clear and moist. No oropharyngeal exudate.   Eyes: EOM are normal. Pupils are equal, round, and reactive to light.   Neck: Normal range of motion. Neck supple. No JVD present. No tracheal deviation present. No " thyromegaly present.   Cardiovascular: Normal rate, regular rhythm and normal heart sounds.   No murmur heard.  Pulmonary/Chest: Effort normal and breath sounds normal. No stridor. No respiratory distress.   Abdominal: Soft. She exhibits no distension and no mass. There is no tenderness. There is no guarding.   Musculoskeletal: Normal range of motion. She exhibits no edema.   Lymphadenopathy:     She has no cervical adenopathy.   Neurological: She is alert and oriented to person, place, and time. No cranial nerve deficit. Coordination normal.   Skin: Skin is warm and dry. No rash noted. She is not diaphoretic.   Psychiatric: She has a normal mood and affect.         Assessment:         ICD-10-CM ICD-9-CM   1. Essential hypertension I10 401.9   2. Postsurgical hypothyroidism E89.0 244.0   3. Hyperlipidemia, unspecified hyperlipidemia type E78.5 272.4   4. Polyarthralgia M25.50 719.49   5. Stress reaction causing mixed disturbance of emotion and conduct F43.0 308.4   6. Hormone replacement therapy (HRT) Z79.890 V07.4   7. History of hepatitis C Z86.19 V12.09   8. Flu vaccine need Z23 V04.81   9. Osteoarthritis of both knees, unspecified osteoarthritis type M17.0 715.96   10. Diabetes mellitus screening Z13.1 V77.1   11. Screening for deficiency anemia Z13.0 V78.1       Plan:       Essential hypertension  -  controlled on present medication.  Will recheck when patient returns in 6-8 weeks to follow-up on starting a new medicine for stress reaction/arthralgias.  -     valsartan-hydrochlorothiazide (DIOVAN-HCT) 160-25 mg per tablet; Take 1 tablet by mouth once daily.  Dispense: 90 tablet; Refill: 0  -     amLODIPine (NORVASC) 5 MG tablet; Take 1 tablet (5 mg total) by mouth once daily.  Dispense: 90 tablet; Refill: 0    Postsurgical hypothyroidism  -  continue Synthroid pending lab results.  Will review labs during next visit when she returns since she has to come back in 6-8 weeks.  -     SYNTHROID 50 mcg tablet; Take 1  tablet (50 mcg total) by mouth once daily.  Dispense: 90 tablet; Refill: 0  -     TSH; Future; Expected date: 10/10/2018  -     T4, free; Future; Expected date: 10/10/2018    Hyperlipidemia, unspecified hyperlipidemia type  -  controlled with lifestyle modifications in the past.  She is overdue for wellness exam so will schedule fasting labs and return in 6-8 weeks for wellness  with fasting labs   -     Lipid panel; Future; Expected date: 10/10/2018    Polyarthralgia  -  complains of chronic musculoskeletal pains that were better when she was on Cymbalta in the past but reports that generic Cymbalta was ineffective in the year 2015 and only brand work so requesting brand Synthroid.  Start Cymbalta at present dose and titrate as directed we will recheck in 6-8 weeks when patient returns for her wellness exam  -     CYMBALTA 30 mg capsule; Take 1 capsule (30 mg total) by mouth once daily for 14 days, THEN 2 capsules (60 mg total) once daily.  Dispense: 60 capsule; Refill: 1      ####ADDENDUM:  Patient's insurance does not cover the BRAND Cymbalta but [facility] can over generic from several different manufacturing companies - so will trial a generic of the Cymbalta #####      Stress reaction causing mixed disturbance of emotion and conduct  -  increased stressors at home with anxiety and depressed mood.  Will start patient back on Cymbalta 30 mg in titrated to 60 mg if needed if the 30 mg is ineffective after 2 weeks.  We will recheck in 6-8 weeks when patient returns for her wellness exam.  -     CYMBALTA 30 mg capsule; Take 1 capsule (30 mg total) by mouth once daily for 14 days, THEN 2 capsules (60 mg total) once daily.  Dispense: 60 capsule; Refill: 1    Hormone replacement therapy (HRT)  -  followed by gyn MD    History of hepatitis C  -  reports treatment combination interferon in the year 2000 and reports her hepatologist states that she was cured.    Flu vaccine need  -     Influenza - Quadrivalent (3  years & older) (PF)    Osteoarthritis of both knees, unspecified osteoarthritis type  -  takes meloxicam only as needed  -     meloxicam (MOBIC) 15 MG tablet; Take 1 tablet (15 mg total) by mouth daily as needed.  Dispense: 90 tablet; Refill: 0    Diabetes mellitus screening  -     Comprehensive metabolic panel; Future; Expected date: 10/10/2018    Screening for deficiency anemia  -     CBC auto differential; Future; Expected date: 10/10/2018      Follow-up in about 7 weeks (around 11/28/2018) for fasting labs and WELLNESS EXAM.        Medication List           Accurate as of 10/10/18  1:23 PM. If you have any questions, ask your nurse or doctor.               START taking these medications    CYMBALTA 30 MG capsule  Generic drug:  DULoxetine  Take 1 capsule (30 mg total) by mouth once daily for 14 days, THEN 2 capsules (60 mg total) once daily.  Start taking on:  10/10/2018  Started by:  Ya Agustin NP        CHANGE how you take these medications    meloxicam 15 MG tablet  Commonly known as:  MOBIC  Take 1 tablet (15 mg total) by mouth daily as needed.  What changed:  See the new instructions.  Changed by:  Ya Agustin NP     SYNTHROID 50 MCG tablet  Generic drug:  levothyroxine  Take 1 tablet (50 mcg total) by mouth once daily.  What changed:  See the new instructions.  Changed by:  Ya Agustin NP        CONTINUE taking these medications    amLODIPine 5 MG tablet  Commonly known as:  NORVASC  Take 1 tablet (5 mg total) by mouth once daily.     butalbital-acetaminophen-caffeine -40 mg -40 mg per tablet  Commonly known as:  FIORICET, ESGIC     CENTRUM WOMEN ORAL     CITRACAL + BONE DENSITY ORAL     estradiol 1 MG tablet  Commonly known as:  ESTRACE     valsartan-hydrochlorothiazide 160-25 mg per tablet  Commonly known as:  DIOVAN-HCT  Take 1 tablet by mouth once daily.           Where to Get Your Medications      These medications were sent to [facility]  [address] [place] [address]    Hours:  Mon-Fri, 8:30a-5p Phone:  [phone]   · amLODIPine 5 MG tablet  · CYMBALTA 30 MG capsule  · meloxicam 15 MG tablet  · SYNTHROID 50 MCG tablet  · valsartan-hydrochlorothiazide 160-25 mg per tablet

## 2018-10-10 NOTE — TELEPHONE ENCOUNTER
----- Message from Jaci Jay sent at 10/10/2018  1:22 PM CDT -----  Patient called regarding medicine CYMBALTA 30 mg , states needs the generic brand , Ya sent over the brand name , please call [facility] about this matter # [phone]. thanks

## 2018-11-16 ENCOUNTER — TELEPHONE (OUTPATIENT)
Dept: FAMILY MEDICINE | Facility: CLINIC | Age: 63
End: 2018-11-16

## 2018-11-16 NOTE — TELEPHONE ENCOUNTER
----- Message from Alda Delgadillo sent at 11/16/2018 10:58 AM CST -----  Contact: [phone]/ self   Pt wants to know if she can be seen earlier then 10:30 am on 11/28. Please advise

## 2018-11-19 ENCOUNTER — LAB VISIT (OUTPATIENT)
Dept: LAB | Facility: HOSPITAL | Age: 63
End: 2018-11-19
Attending: NURSE PRACTITIONER
Payer: COMMERCIAL

## 2018-11-19 DIAGNOSIS — E89.0 POSTSURGICAL HYPOTHYROIDISM: ICD-10-CM

## 2018-11-19 DIAGNOSIS — E78.5 HYPERLIPIDEMIA, UNSPECIFIED HYPERLIPIDEMIA TYPE: ICD-10-CM

## 2018-11-19 DIAGNOSIS — Z13.0 SCREENING FOR DEFICIENCY ANEMIA: ICD-10-CM

## 2018-11-19 DIAGNOSIS — Z13.1 DIABETES MELLITUS SCREENING: ICD-10-CM

## 2018-11-19 LAB
ALBUMIN SERPL BCP-MCNC: 3.6 G/DL
ALP SERPL-CCNC: 69 U/L
ALT SERPL W/O P-5'-P-CCNC: 14 U/L
ANION GAP SERPL CALC-SCNC: 5 MMOL/L
AST SERPL-CCNC: 22 U/L
BASOPHILS # BLD AUTO: 0.03 K/UL
BASOPHILS NFR BLD: 0.6 %
BILIRUB SERPL-MCNC: 0.7 MG/DL
BUN SERPL-MCNC: 15 MG/DL
CALCIUM SERPL-MCNC: 8.8 MG/DL
CHLORIDE SERPL-SCNC: 102 MMOL/L
CHOLEST SERPL-MCNC: 218 MG/DL
CHOLEST/HDLC SERPL: 3.4 {RATIO}
CO2 SERPL-SCNC: 33 MMOL/L
CREAT SERPL-MCNC: 0.8 MG/DL
DIFFERENTIAL METHOD: ABNORMAL
EOSINOPHIL # BLD AUTO: 0.3 K/UL
EOSINOPHIL NFR BLD: 5.2 %
ERYTHROCYTE [DISTWIDTH] IN BLOOD BY AUTOMATED COUNT: 12.3 %
EST. GFR  (AFRICAN AMERICAN): >60 ML/MIN/1.73 M^2
EST. GFR  (NON AFRICAN AMERICAN): >60 ML/MIN/1.73 M^2
GLUCOSE SERPL-MCNC: 98 MG/DL
HCT VFR BLD AUTO: 43.4 %
HDLC SERPL-MCNC: 64 MG/DL
HDLC SERPL: 29.4 %
HGB BLD-MCNC: 14.6 G/DL
IMM GRANULOCYTES # BLD AUTO: 0.03 K/UL
IMM GRANULOCYTES NFR BLD AUTO: 0.6 %
LDLC SERPL CALC-MCNC: 123.2 MG/DL
LYMPHOCYTES # BLD AUTO: 1.8 K/UL
LYMPHOCYTES NFR BLD: 34.6 %
MCH RBC QN AUTO: 30.7 PG
MCHC RBC AUTO-ENTMCNC: 33.6 G/DL
MCV RBC AUTO: 91 FL
MONOCYTES # BLD AUTO: 0.5 K/UL
MONOCYTES NFR BLD: 9.7 %
NEUTROPHILS # BLD AUTO: 2.5 K/UL
NEUTROPHILS NFR BLD: 49.3 %
NONHDLC SERPL-MCNC: 154 MG/DL
NRBC BLD-RTO: 0 /100 WBC
PLATELET # BLD AUTO: 293 K/UL
PMV BLD AUTO: 11 FL
POTASSIUM SERPL-SCNC: 3.7 MMOL/L
PROT SERPL-MCNC: 6.5 G/DL
RBC # BLD AUTO: 4.76 M/UL
SODIUM SERPL-SCNC: 140 MMOL/L
T4 FREE SERPL-MCNC: 0.97 NG/DL
TRIGL SERPL-MCNC: 154 MG/DL
TSH SERPL DL<=0.005 MIU/L-ACNC: 3.18 UIU/ML
WBC # BLD AUTO: 5.15 K/UL

## 2018-11-19 PROCEDURE — 84439 ASSAY OF FREE THYROXINE: CPT

## 2018-11-19 PROCEDURE — 85025 COMPLETE CBC W/AUTO DIFF WBC: CPT

## 2018-11-19 PROCEDURE — 80053 COMPREHEN METABOLIC PANEL: CPT

## 2018-11-19 PROCEDURE — 36415 COLL VENOUS BLD VENIPUNCTURE: CPT | Mod: PO

## 2018-11-19 PROCEDURE — 84443 ASSAY THYROID STIM HORMONE: CPT

## 2018-11-19 PROCEDURE — 80061 LIPID PANEL: CPT

## 2018-11-20 ENCOUNTER — OFFICE VISIT (OUTPATIENT)
Dept: FAMILY MEDICINE | Facility: CLINIC | Age: 63
End: 2018-11-20
Payer: COMMERCIAL

## 2018-11-20 VITALS
SYSTOLIC BLOOD PRESSURE: 120 MMHG | RESPIRATION RATE: 16 BRPM | OXYGEN SATURATION: 96 % | HEIGHT: 69 IN | BODY MASS INDEX: 28.28 KG/M2 | WEIGHT: 190.94 LBS | TEMPERATURE: 98 F | HEART RATE: 70 BPM | DIASTOLIC BLOOD PRESSURE: 80 MMHG

## 2018-11-20 DIAGNOSIS — E78.5 HYPERLIPIDEMIA, UNSPECIFIED HYPERLIPIDEMIA TYPE: ICD-10-CM

## 2018-11-20 DIAGNOSIS — Z86.19 HISTORY OF HEPATITIS C: ICD-10-CM

## 2018-11-20 DIAGNOSIS — M25.50 POLYARTHRALGIA: ICD-10-CM

## 2018-11-20 DIAGNOSIS — E89.0 POSTSURGICAL HYPOTHYROIDISM: ICD-10-CM

## 2018-11-20 DIAGNOSIS — E66.3 OVERWEIGHT (BMI 25.0-29.9): ICD-10-CM

## 2018-11-20 DIAGNOSIS — F43.0 STRESS REACTION CAUSING MIXED DISTURBANCE OF EMOTION AND CONDUCT: ICD-10-CM

## 2018-11-20 DIAGNOSIS — I10 ESSENTIAL HYPERTENSION: ICD-10-CM

## 2018-11-20 DIAGNOSIS — Z79.890 HORMONE REPLACEMENT THERAPY (HRT): ICD-10-CM

## 2018-11-20 DIAGNOSIS — Z00.00 ANNUAL PHYSICAL EXAM: Primary | ICD-10-CM

## 2018-11-20 DIAGNOSIS — M17.0 OSTEOARTHRITIS OF BOTH KNEES, UNSPECIFIED OSTEOARTHRITIS TYPE: ICD-10-CM

## 2018-11-20 DIAGNOSIS — R55 SYNCOPE AND COLLAPSE: ICD-10-CM

## 2018-11-20 PROCEDURE — 3079F DIAST BP 80-89 MM HG: CPT | Mod: CPTII,S$GLB,, | Performed by: NURSE PRACTITIONER

## 2018-11-20 PROCEDURE — 99396 PREV VISIT EST AGE 40-64: CPT | Mod: S$GLB,,, | Performed by: NURSE PRACTITIONER

## 2018-11-20 PROCEDURE — 99999 PR PBB SHADOW E&M-EST. PATIENT-LVL V: CPT | Mod: PBBFAC,,, | Performed by: NURSE PRACTITIONER

## 2018-11-20 PROCEDURE — 3074F SYST BP LT 130 MM HG: CPT | Mod: CPTII,S$GLB,, | Performed by: NURSE PRACTITIONER

## 2018-11-20 RX ORDER — DULOXETIN HYDROCHLORIDE 60 MG/1
60 CAPSULE, DELAYED RELEASE ORAL DAILY
Qty: 30 CAPSULE | Refills: 5 | Status: SHIPPED | OUTPATIENT
Start: 2018-11-20 | End: 2019-03-15 | Stop reason: SDUPTHER

## 2018-11-20 NOTE — PROGRESS NOTES
"Subjective:       Patient ID: Gudelia Cerrato is a 63 y.o. female.    Chief Complaint: Follow-up (med check)    Patient is a 62-year-old white female with hypertension, hyperlipidemia that is on lifestyle modifications only, hypothyroidism, history of hepatitisC, on hormone replacement therapy, chronic idiopathic recurrent syncope and collapse and osteoarthritis that is here today for Annual Physical Exam with Fasting lab results.     Patient has hypertension and was taking lisinopril HCT 20/20 5 mg daily with amlodipine 5 mg daily in June 2018 but complained of intermittent dizziness and chronic fatigue that could be secondary to the lisinopril so patient was changed to valsartan /25 mg daily and continued the amlodipine 5 mg daily and blood pressure is well controlled.  /80   Pulse 70   Temp 98.2 °F (36.8 °C) (Oral)   Resp 16   Ht 5' 9" (1.753 m)   Wt 86.6 kg (190 lb 14.7 oz)   SpO2 96%   BMI 28.19 kg/m²      Patient has hyperlipidemia that has been controlled on lifestyle modifications in the past but her Total cholesterol is again elevated at 218 with triglycerides high at 154.  HDL 64 with .2.  Her 10-year calculated risk per pooled-cohort equation is 4.2%.  Patient has been tried on STATIN therapy in the past and unable to tolerate.  Advised patient that because 10-year risk is < 5% no medication is required but if she wants to lower cardiovascular risks, I am willing to start her on Zetia to decrease levels.  Patient declines medication at this time.  Will recheck in 1 year.      Patient has postsurgical hypothyroidism and takes brand Synthroid 50 mcg daily and controlled on this dose.  See results below.     Patient has a history of depression back in 2015 while getting the divorce and was on Cymbalta 60 mg daily.  Patient had reported that when Cymbalta changed to the generic it was no longer effective so she required the brand Cymbalta back in 2015 and was  able to get off the " medicine around 2016.  Patient reported at October 2018 visit that she was again having increased stress/anxiety/and depressed mood due to family stressors daily with parents Health conditions.  And she also reported polyarthralgias that she found the Cymbalta helped with chronic musculoskeletal pain as well as mood in the past so I started patient back on Cymbalta 30 mg at October 2018 visit and titrated up to 60 mg daily.  Patient reports she was unable to afford the BRAND Cymbalta as it would have been over $800 but pharmacist told her there were several manufacturers of generic so she can try one and if ineffective, they can try a different .  Patient reports that the Anxiety and Depressed mood may have helped a very little but not much and no affect on chronic musculoskeletal pains.  Advised patient to continue the Cymbalta 60 mg and try different  for the Stress reaction symptoms BUT should see her Orthopedic MD for worsening of arthralgias as she has known OA of knees and also reporting worsening of bilateral hip pain over the past several years.  Advised patient that she can not just depend on medication to fix things - she should be seeing her Orthopedic specialist to monitor these conditions for worsening = she has not seen Dr. Schwartz in several years = advised to schedule appt.       Patient has osteoarthritis in the knees and takes meloxicam only as needed.  Knee pain is worsening.  Advised to reschedule appt with her Orthopedic MD for further evaluation - already established with Dr. Schwartz     Patient is on hormone replacement therapy by her gyn MD.    Patient has Chronic Recurrent Idiopathic Syncopal Episodes.  She has had workups from Neurologist and Cardiologist and Electrophysiologists in past and all testing negative.  Reports family history of idiopathic syncope.    Wellness Labs:  -  CBC WNL  -  CMP within acceptable range  -  Cholesterol discussed above.  -  Thyroid  controlled on present medication.    Health maintenance:  -  Up to date.          Component      Latest Ref Rng & Units 11/19/2018 9/28/2017 7/21/2016   WBC      3.90 - 12.70 K/uL 5.15 5.73    RBC      4.00 - 5.40 M/uL 4.76 4.85    Hemoglobin      12.0 - 16.0 g/dL 14.6 14.9    Hematocrit      37.0 - 48.5 % 43.4 43.4    MCV      82 - 98 fL 91 90    MCH      27.0 - 31.0 pg 30.7 30.7    MCHC      32.0 - 36.0 g/dL 33.6 34.3    RDW      11.5 - 14.5 % 12.3 12.9    Platelets      150 - 350 K/uL 293 248    MPV      9.2 - 12.9 fL 11.0 11.1    Immature Granulocytes      0.0 - 0.5 % 0.6 (H)     Gran # (ANC)      1.8 - 7.7 K/uL 2.5 3.4    Immature Grans (Abs)      0.00 - 0.04 K/uL 0.03     Lymph #      1.0 - 4.8 K/uL 1.8 1.7    Mono #      0.3 - 1.0 K/uL 0.5 0.5    Eos #      0.0 - 0.5 K/uL 0.3 0.1    Baso #      0.00 - 0.20 K/uL 0.03 0.02    nRBC      0 /100 WBC 0     Gran%      38.0 - 73.0 % 49.3 59.3    Lymph%      18.0 - 48.0 % 34.6 29.7    Mono%      4.0 - 15.0 % 9.7 8.0    Eosinophil%      0.0 - 8.0 % 5.2 2.4    Basophil%      0.0 - 1.9 % 0.6 0.3    Differential Method       Automated Automated    Sodium      136 - 145 mmol/L 140 141 142   Potassium      3.5 - 5.1 mmol/L 3.7 3.5 3.7   Chloride      95 - 110 mmol/L 102 103 103   CO2      23 - 29 mmol/L 33 (H) 31 (H) 28   Glucose      70 - 110 mg/dL 98 90 104   BUN, Bld      8 - 23 mg/dL 15 15 17   Creatinine      0.5 - 1.4 mg/dL 0.8 0.8 0.9   Calcium      8.7 - 10.5 mg/dL 8.8 9.0 9.4   Total Protein      6.0 - 8.4 g/dL 6.5 6.3 6.2   Albumin      3.5 - 5.2 g/dL 3.6 3.5 3.7   Total Bilirubin      0.1 - 1.0 mg/dL 0.7 1.0 0.7   Alkaline Phosphatase      55 - 135 U/L 69 55 44 (L)   AST      10 - 40 U/L 22 18 20   ALT      10 - 44 U/L 14 17 20   Anion Gap      8 - 16 mmol/L 5 (L) 7 (L) 11   eGFR if African American      >60 mL/min/1.73 m:2 >60.0 >60 >60.0   eGFR if non African American      >60 mL/min/1.73 m:2 >60.0 >60 >60.0   Cholesterol      120 - 199 mg/dL 218 (H) 201 (H)  146   Triglycerides      30 - 150 mg/dL 154 (H) 136 78   HDL      40 - 75 mg/dL 64 65 60   LDL Cholesterol      63.0 - 159.0 mg/dL 123.2 108.8 70.4   HDL/Chol Ratio      20.0 - 50.0 % 29.4 32.3 41.1   Total Cholesterol/HDL Ratio      2.0 - 5.0 3.4 3.1 2.4   Non-HDL Cholesterol      mg/dL 154 136 86   TSH      0.400 - 4.000 uIU/mL 3.179 2.831 2.503   Free T4      0.71 - 1.51 ng/dL 0.97 0.95 0.91       Current Outpatient Medications   Medication Sig Dispense Refill    amLODIPine (NORVASC) 5 MG tablet Take 1 tablet (5 mg total) by mouth once daily. 90 tablet 0    butalbital-acetaminophen-caffeine -40 mg (FIORICET, ESGIC) -40 mg per tablet Take 1 tablet by mouth every 4 (four) hours as needed.  2    calcium crb,cit/D3/min34/nancy (CITRACAL + BONE DENSITY ORAL) Citracal + Bone Density      DULoxetine (CYMBALTA) 30 MG capsule Take 1 capsule (30 mg total) by mouth once daily for 14 days, THEN 2 capsules (60 mg total) once daily. 60 capsule 1    estradiol (ESTRACE) 1 MG tablet Take 1 mg by mouth once daily.  6    meloxicam (MOBIC) 15 MG tablet Take 1 tablet (15 mg total) by mouth daily as needed. 90 tablet 0    multivitamin/iron/folic acid (CENTRUM WOMEN ORAL) Centrum      SYNTHROID 50 mcg tablet Take 1 tablet (50 mcg total) by mouth once daily. 90 tablet 0    valsartan-hydrochlorothiazide (DIOVAN-HCT) 160-25 mg per tablet Take 1 tablet by mouth once daily. 90 tablet 0     No current facility-administered medications for this visit.        Past Medical History:   Diagnosis Date    Depression     GERD (gastroesophageal reflux disease)     Hepatitis C 2000    Treated with Combination Interferon and told she is [facility]    Hyperlipidemia     Hypertension     Postsurgical hypothyroidism     Reflux     Syncope and collapse 2013    known recurring syncope and collapse - has had a complete workup from a Neurological specialist, from a Cardiac Specialist with  Electrophysiologist and all testing completely negative; Idiopathic Syncope does run in the family    Urge incontinence of urine        Past Surgical History:   Procedure Laterality Date    breast reduction      COLONOSCOPY  11/6/2013    + polyp - Repeat in 5 years    COLONOSCOPY  02/13/2017    + polyp - repeat in 5 years - Dr. Gutiérrez    HYSTERECTOMY      Partial - still has ovaries    ROTATOR CUFF REPAIR      SPINE SURGERY  8/22/13    bone spur to cervical spine removed    THYROIDECTOMY Right     Partial - right lobe removed    TONSILLECTOMY         Family History   Problem Relation Age of Onset    Colon cancer Paternal Grandmother     Colon cancer Maternal Grandmother     Cancer Maternal Grandmother         colon    Hyperlipidemia Mother     Stroke Mother 78        stents placed for carotid blockage; multiple TIAs    Hypertension Father     Stroke Father 84    Parkinsonism Maternal Grandfather         passed in his 70's    Heart disease Paternal Grandfather     Asthma Sister         in her 50's    Hypertension Brother     Hypertension Sister     Hypertension Sister     Breast cancer Neg Hx     Ovarian cancer Neg Hx        Social History     Socioeconomic History    Marital status:      Spouse name: None    Number of children: None    Years of education: None    Highest education level: None   Social Needs    Financial resource strain: None    Food insecurity - worry: None    Food insecurity - inability: None    Transportation needs - medical: None    Transportation needs - non-medical: None   Occupational History    None   Tobacco Use    Smoking status: Never Smoker    Smokeless tobacco: Never Used   Substance and Sexual Activity    Alcohol use: Yes    Drug use: No    Sexual activity: Yes   Other Topics Concern    None   Social History Narrative    None       Review of Systems   Constitutional: Negative for activity change and unexpected weight change.   HENT:  "Positive for rhinorrhea. Negative for hearing loss and trouble swallowing.    Eyes: Negative for discharge and visual disturbance.   Respiratory: Negative for chest tightness and wheezing.    Cardiovascular: Negative for chest pain and palpitations.   Gastrointestinal: Negative for blood in stool, constipation, diarrhea and vomiting.   Endocrine: Negative for polydipsia and polyuria.   Genitourinary: Negative for difficulty urinating, dysuria, hematuria and menstrual problem.   Musculoskeletal: Positive for arthralgias and joint swelling. Negative for neck pain.   Neurological: Negative for weakness and headaches.   Psychiatric/Behavioral: Negative for confusion and dysphoric mood.         Objective:     Vitals:    11/20/18 0917 11/20/18 0936   BP: 120/80 120/80   Pulse: 70    Resp: 16    Temp: 98.2 °F (36.8 °C)    TempSrc: Oral    SpO2: 96%    Weight: 86.6 kg (190 lb 14.7 oz)    Height: 5' 9" (1.753 m)           Physical Exam   Constitutional: She is oriented to person, place, and time. She appears well-developed and well-nourished. No distress.   Body mass index is 28.19 kg/m².           HENT:   Head: Normocephalic and atraumatic.   Right Ear: External ear normal.   Left Ear: External ear normal.   Nose: Nose normal.   Mouth/Throat: Oropharynx is clear and moist. No oropharyngeal exudate.   Eyes: EOM are normal. Pupils are equal, round, and reactive to light.   Neck: Normal range of motion. Neck supple. No JVD present. No tracheal deviation present. No thyromegaly present.   Cardiovascular: Normal rate, regular rhythm and normal heart sounds.   No murmur heard.  Pulmonary/Chest: Effort normal and breath sounds normal. No stridor. No respiratory distress.   Abdominal: Soft. She exhibits no distension and no mass. There is no tenderness. There is no guarding.   Musculoskeletal: Normal range of motion. She exhibits no edema.   Lymphadenopathy:     She has no cervical adenopathy.   Neurological: She is alert and " oriented to person, place, and time. No cranial nerve deficit. Coordination normal.   Skin: Skin is warm and dry. No rash noted. She is not diaphoretic.   Psychiatric: She has a normal mood and affect.         Assessment:         ICD-10-CM ICD-9-CM   1. Annual physical exam Z00.00 V70.0   2. Syncope and collapse R55 780.2   3. Essential hypertension I10 401.9   4. Postsurgical hypothyroidism E89.0 244.0   5. Hyperlipidemia, unspecified hyperlipidemia type E78.5 272.4   6. Hormone replacement therapy (HRT) Z79.890 V07.4   7. Stress reaction causing mixed disturbance of emotion and conduct F43.0 308.4   8. Osteoarthritis of both knees, unspecified osteoarthritis type M17.0 715.96   9. Polyarthralgia M25.50 719.49   10. History of hepatitis C Z86.19 V12.09       Plan:       Annual physical exam  Health Maintenance Summary     Mammogram Next Due 12/30/2018  Had done at [facility] - requesting record    Done 12/30/2016     Done 11/2/2015    Colonoscopy Next Due 2/13/2022     Done 2/13/2017 Dr. Gutiérrez    Done 11/6/2013 repeat in 5 years   TETANUS VACCINE Next Due 3/8/2022     Done 3/8/2012 Imm Admin: Tdap   Lipid Panel Next Due 11/19/2023     Done 11/19/2018 LIPID PANEL Abnormal     Done 9/28/2017 LIPID PANEL Abnormal     Done 7/21/2016 LIPID PANEL    Done 12/15/2015 LIPID PANEL    Done 4/30/2015 LIPID PANEL   Zoster Vaccine This plan is no longer active.     Done 10/4/2017 Imm Admin: Zoster   Influenza Vaccine This plan is no longer active.     Done 10/10/2018 Imm Admin: Influenza - Quadrivalent - PF    Done 10/4/2017 Imm Admin: Influenza - Quadrivalent - PF    Done 11/28/2016 Imm Admin: Influenza - Quadrivalent    Done 12/15/2015 Imm Admin: Influenza - Quadrivalent   Hepatitis C Screening This plan is no longer active.     Not Clinically Appropriate 7/12/2016 Patient has a history of Hepatitis C         Syncope and collapse  -  Idiopathic and worked up by specialist in past - see medical history    Essential hypertension  -   Controlled on present medications.  Will send refill request when needed.  Recheck in 6 months.    Postsurgical hypothyroidism  -  Well controlled on present medication for several years so will recheck in 1 year with labs unless symptomatic.    Hyperlipidemia, unspecified hyperlipidemia type  -  < 5% risk so advised on lifestyle modifications.  Longer discussion in HPI.  See above.    Hormone replacement therapy (HRT)  -  Followed by GYN MD    Stress reaction causing mixed disturbance of emotion and conduct  -  Continue the generic Cymbalta 60 mg daily and try a different  - if working well, follow up in 6 months.  If ineffective, return for other treatment options for the mood/emotions.  The chronic musculoskeletal/joint pains - the knees and hips need to be seen by orthopedic MD and addressed.  -     DULoxetine (CYMBALTA) 60 MG capsule; Take 1 capsule (60 mg total) by mouth once daily.  Dispense: 30 capsule; Refill: 5    Osteoarthritis of both knees, unspecified osteoarthritis type  -  See orthopedic Md    Polyarthralgia    History of hepatitis C  -  CURED with treatment in year 2000    Overweight (BMI 25.0-29.9)  -  Advised on lifestyle modifications.      Follow-up in about 6 months (around 5/20/2019) for med check only.        Medication List           Accurate as of 11/20/18  1:37 PM. If you have any questions, ask your nurse or doctor.               CHANGE how you take these medications    DULoxetine 60 MG capsule  Commonly known as:  CYMBALTA  Take 1 capsule (60 mg total) by mouth once daily.  What changed:    · medication strength  · See the new instructions.  Changed by:  Ya Agustin NP        CONTINUE taking these medications    amLODIPine 5 MG tablet  Commonly known as:  NORVASC  Take 1 tablet (5 mg total) by mouth once daily.     butalbital-acetaminophen-caffeine -40 mg -40 mg per tablet  Commonly known as:  FIORICET, ESGIC     CENTRUM WOMEN ORAL     CITRACAL + BONE DENSITY  ORAL     estradiol 1 MG tablet  Commonly known as:  ESTRACE     meloxicam 15 MG tablet  Commonly known as:  MOBIC  Take 1 tablet (15 mg total) by mouth daily as needed.     SYNTHROID 50 MCG tablet  Generic drug:  levothyroxine  Take 1 tablet (50 mcg total) by mouth once daily.     valsartan-hydrochlorothiazide 160-25 mg per tablet  Commonly known as:  DIOVAN-HCT  Take 1 tablet by mouth once daily.           Where to Get Your Medications      These medications were sent to [facility]  [address], [place] [address]    Hours:  Mon-Fri, 8:30a-5p Phone:  [phone]   · DULoxetine 60 MG capsule

## 2019-01-03 DIAGNOSIS — E89.0 POSTSURGICAL HYPOTHYROIDISM: ICD-10-CM

## 2019-01-03 DIAGNOSIS — I10 ESSENTIAL HYPERTENSION: ICD-10-CM

## 2019-01-03 RX ORDER — AMLODIPINE BESYLATE 5 MG/1
5 TABLET ORAL DAILY
Qty: 90 TABLET | Refills: 1 | Status: SHIPPED | OUTPATIENT
Start: 2019-01-03 | End: 2019-07-01

## 2019-01-03 RX ORDER — LEVOTHYROXINE SODIUM 50 UG/1
50 TABLET ORAL DAILY
Qty: 90 TABLET | Refills: 1 | Status: SHIPPED | OUTPATIENT
Start: 2019-01-03 | End: 2019-07-01

## 2019-01-03 RX ORDER — VALSARTAN AND HYDROCHLOROTHIAZIDE 160; 25 MG/1; MG/1
1 TABLET ORAL DAILY
Qty: 90 TABLET | Refills: 1 | Status: SHIPPED | OUTPATIENT
Start: 2019-01-03 | End: 2019-07-01 | Stop reason: SDUPTHER

## 2019-01-24 DIAGNOSIS — Z12.39 BREAST CANCER SCREENING: ICD-10-CM

## 2019-02-02 ENCOUNTER — PATIENT MESSAGE (OUTPATIENT)
Dept: FAMILY MEDICINE | Facility: CLINIC | Age: 64
End: 2019-02-02

## 2019-02-04 RX ORDER — SCOLOPAMINE TRANSDERMAL SYSTEM 1 MG/1
1 PATCH, EXTENDED RELEASE TRANSDERMAL
Qty: 4 PATCH | Refills: 0 | Status: SHIPPED | OUTPATIENT
Start: 2019-02-04 | End: 2019-05-22 | Stop reason: ALTCHOICE

## 2019-03-15 DIAGNOSIS — F43.0 STRESS REACTION CAUSING MIXED DISTURBANCE OF EMOTION AND CONDUCT: ICD-10-CM

## 2019-03-15 RX ORDER — DULOXETIN HYDROCHLORIDE 60 MG/1
60 CAPSULE, DELAYED RELEASE ORAL DAILY
Qty: 30 CAPSULE | Refills: 1 | Status: SHIPPED | OUTPATIENT
Start: 2019-03-15 | End: 2019-04-18 | Stop reason: SDUPTHER

## 2019-04-02 DIAGNOSIS — M17.0 OSTEOARTHRITIS OF BOTH KNEES, UNSPECIFIED OSTEOARTHRITIS TYPE: ICD-10-CM

## 2019-04-02 RX ORDER — MELOXICAM 15 MG/1
15 TABLET ORAL DAILY PRN
Qty: 90 TABLET | Refills: 0 | Status: SHIPPED | OUTPATIENT
Start: 2019-04-02 | End: 2019-11-26 | Stop reason: SDUPTHER

## 2019-04-17 ENCOUNTER — PATIENT MESSAGE (OUTPATIENT)
Dept: FAMILY MEDICINE | Facility: CLINIC | Age: 64
End: 2019-04-17

## 2019-04-17 DIAGNOSIS — F43.0 STRESS REACTION CAUSING MIXED DISTURBANCE OF EMOTION AND CONDUCT: ICD-10-CM

## 2019-04-18 RX ORDER — DULOXETIN HYDROCHLORIDE 60 MG/1
60 CAPSULE, DELAYED RELEASE ORAL DAILY
Qty: 90 CAPSULE | Refills: 1 | Status: SHIPPED | OUTPATIENT
Start: 2019-04-18 | End: 2019-07-01 | Stop reason: SDUPTHER

## 2019-05-22 ENCOUNTER — OFFICE VISIT (OUTPATIENT)
Dept: FAMILY MEDICINE | Facility: CLINIC | Age: 64
End: 2019-05-22
Payer: COMMERCIAL

## 2019-05-22 VITALS
HEIGHT: 69 IN | HEART RATE: 71 BPM | SYSTOLIC BLOOD PRESSURE: 122 MMHG | DIASTOLIC BLOOD PRESSURE: 78 MMHG | WEIGHT: 197 LBS | TEMPERATURE: 98 F | OXYGEN SATURATION: 97 % | RESPIRATION RATE: 18 BRPM | BODY MASS INDEX: 29.18 KG/M2

## 2019-05-22 DIAGNOSIS — Z86.19 HISTORY OF HEPATITIS C: ICD-10-CM

## 2019-05-22 DIAGNOSIS — Z23 NEED FOR STREPTOCOCCUS PNEUMONIAE VACCINATION: ICD-10-CM

## 2019-05-22 DIAGNOSIS — Z13.1 DIABETES MELLITUS SCREENING: ICD-10-CM

## 2019-05-22 DIAGNOSIS — E78.5 HYPERLIPIDEMIA, UNSPECIFIED HYPERLIPIDEMIA TYPE: ICD-10-CM

## 2019-05-22 DIAGNOSIS — I10 ESSENTIAL HYPERTENSION: Primary | ICD-10-CM

## 2019-05-22 DIAGNOSIS — Z79.890 HORMONE REPLACEMENT THERAPY (HRT): ICD-10-CM

## 2019-05-22 DIAGNOSIS — M25.50 POLYARTHRALGIA: ICD-10-CM

## 2019-05-22 DIAGNOSIS — Z13.0 SCREENING FOR DEFICIENCY ANEMIA: ICD-10-CM

## 2019-05-22 DIAGNOSIS — E89.0 POSTSURGICAL HYPOTHYROIDISM: ICD-10-CM

## 2019-05-22 PROCEDURE — 90732 PNEUMOCOCCAL POLYSACCHARIDE VACCINE 23-VALENT =>2YO SQ IM: ICD-10-PCS | Mod: S$GLB,,, | Performed by: NURSE PRACTITIONER

## 2019-05-22 PROCEDURE — 3078F DIAST BP <80 MM HG: CPT | Mod: CPTII,S$GLB,, | Performed by: NURSE PRACTITIONER

## 2019-05-22 PROCEDURE — 3078F PR MOST RECENT DIASTOLIC BLOOD PRESSURE < 80 MM HG: ICD-10-PCS | Mod: CPTII,S$GLB,, | Performed by: NURSE PRACTITIONER

## 2019-05-22 PROCEDURE — 3008F BODY MASS INDEX DOCD: CPT | Mod: CPTII,S$GLB,, | Performed by: NURSE PRACTITIONER

## 2019-05-22 PROCEDURE — 90471 IMMUNIZATION ADMIN: CPT | Mod: S$GLB,,, | Performed by: NURSE PRACTITIONER

## 2019-05-22 PROCEDURE — 99214 PR OFFICE/OUTPT VISIT, EST, LEVL IV, 30-39 MIN: ICD-10-PCS | Mod: 25,S$GLB,, | Performed by: NURSE PRACTITIONER

## 2019-05-22 PROCEDURE — 3074F SYST BP LT 130 MM HG: CPT | Mod: CPTII,S$GLB,, | Performed by: NURSE PRACTITIONER

## 2019-05-22 PROCEDURE — 90471 PNEUMOCOCCAL POLYSACCHARIDE VACCINE 23-VALENT =>2YO SQ IM: ICD-10-PCS | Mod: S$GLB,,, | Performed by: NURSE PRACTITIONER

## 2019-05-22 PROCEDURE — 3008F PR BODY MASS INDEX (BMI) DOCUMENTED: ICD-10-PCS | Mod: CPTII,S$GLB,, | Performed by: NURSE PRACTITIONER

## 2019-05-22 PROCEDURE — 99999 PR PBB SHADOW E&M-EST. PATIENT-LVL V: CPT | Mod: PBBFAC,,, | Performed by: NURSE PRACTITIONER

## 2019-05-22 PROCEDURE — 99214 OFFICE O/P EST MOD 30 MIN: CPT | Mod: 25,S$GLB,, | Performed by: NURSE PRACTITIONER

## 2019-05-22 PROCEDURE — 3074F PR MOST RECENT SYSTOLIC BLOOD PRESSURE < 130 MM HG: ICD-10-PCS | Mod: CPTII,S$GLB,, | Performed by: NURSE PRACTITIONER

## 2019-05-22 PROCEDURE — 99999 PR PBB SHADOW E&M-EST. PATIENT-LVL V: ICD-10-PCS | Mod: PBBFAC,,, | Performed by: NURSE PRACTITIONER

## 2019-05-22 PROCEDURE — 90732 PPSV23 VACC 2 YRS+ SUBQ/IM: CPT | Mod: S$GLB,,, | Performed by: NURSE PRACTITIONER

## 2019-05-22 RX ORDER — TRAMADOL HYDROCHLORIDE 50 MG/1
50 TABLET ORAL EVERY 6 HOURS
COMMUNITY
End: 2019-07-18 | Stop reason: SDUPTHER

## 2019-05-22 NOTE — PROGRESS NOTES
"Subjective:       Patient ID: Gudelia Cerrato is a 63 y.o. female.    Chief Complaint: Follow-up (med check)    Patient is a 63-year-old white female with hypertension, hyperlipidemia that is on lifestyle modifications only, hypothyroidism, history of hepatitisC, on hormone replacement therapy, chronic idiopathic recurrent syncope and collapse and osteoarthritis that is here today for 6 month follow up.     Patient has hypertension and was taking lisinopril HCT 20/20 5 mg daily with amlodipine 5 mg daily in June 2018 but complained of intermittent dizziness and chronic fatigue that could be secondary to the lisinopril so patient was changed to valsartan /25 mg daily and continued the amlodipine 5 mg daily and blood pressure is well controlled.  /78   Pulse 71   Temp 98.1 °F (36.7 °C) (Oral)   Resp 18   Ht 5' 9" (1.753 m)   Wt 89.4 kg (197 lb)   SpO2 97%   BMI 29.09 kg/m²        Patient has hyperlipidemia and currently on lifestyle modifications only. Her 10-year calculated risk per pooled-cohort equation was 4.2% in November 2018 when cholesterol last checked..  Patient has been tried on STATIN therapy in the past and unable to tolerate.  Advised patient that because 10-year risk is < 5% no medication is required but if she wants to lower cardiovascular risks, I am willing to start her on Zetia to decrease levels.  Patient declines medication at this time.  She will be due to recheck cholesterol levels in November 2019.    Patient has postsurgical hypothyroidism and takes brand Synthroid 50 mcg daily and controlled on this dose.  Will be due to recheck thyroid levels in November 2019.     Patient has a history of depression back in 2015 while getting the divorce and was on Cymbalta 60 mg daily.  Patient had reported that when Cymbalta changed to the generic it was no longer effective so she required the brand Cymbalta back in 2015 and was  able to get off the medicine around 2016.  Patient " reported at October 2018 visit that she was again having increased stress/anxiety/and depressed mood due to family stressors daily with parents Health conditions.  And she also reported polyarthralgias that she found the Cymbalta helped with chronic musculoskeletal pain as well as mood in the past so I started patient back on Cymbalta 30 mg at October 2018 visit and titrated up to 60 mg daily.  Patient reports she was unable to afford the BRAND Cymbalta as it would have been over $800  So has been taking generic Cymbalta 60 mg daily.  The polyarthralgias are still present and followed by Orthopedic MD - she reports she will need a hip replacement and is being seen by Dr. Juan Steel.     Patient is on hormone replacement therapy by her gyn MD.     Patient has Chronic Recurrent Idiopathic Syncopal Episodes.  She has had workups from Neurologist and Cardiologist and Electrophysiologists in past and all testing negative.  Reports family history of idiopathic syncope.         Current Outpatient Medications   Medication Sig Dispense Refill    amLODIPine (NORVASC) 5 MG tablet Take 1 tablet (5 mg total) by mouth once daily. 90 tablet 1    calcium crb,cit/D3/min34/nancy (CITRACAL + BONE DENSITY ORAL) Citracal + Bone Density      DULoxetine (CYMBALTA) 60 MG capsule Take 1 capsule (60 mg total) by mouth once daily. 90 capsule 1    estradiol (ESTRACE) 1 MG tablet Take 1 mg by mouth once daily.  6    meloxicam (MOBIC) 15 MG tablet Take 1 tablet (15 mg total) by mouth daily as needed. 90 tablet 0    multivitamin/iron/folic acid (CENTRUM WOMEN ORAL) Centrum      SYNTHROID 50 mcg tablet Take 1 tablet (50 mcg total) by mouth once daily. 90 tablet 1    traMADol (ULTRAM) 50 mg tablet Take 50 mg by mouth every 6 (six) hours.      valsartan-hydrochlorothiazide (DIOVAN-HCT) 160-25 mg per tablet Take 1 tablet by mouth once daily. 90 tablet 1    butalbital-acetaminophen-caffeine -40 mg (FIORICET, ESGIC) -40 mg  per tablet Take 1 tablet by mouth every 4 (four) hours as needed.  2    varicella-zoster gE-AS01B, PF, (SHINGRIX, PF,) 50 mcg/0.5 mL injection Inject 0.5 mLs into the muscle once. for 1 dose 0.5 mL 1     No current facility-administered medications for this visit.        Past Medical History:   Diagnosis Date    Depression     GERD (gastroesophageal reflux disease)     Hepatitis C 2000    Treated with Combination Interferon and told she is C[facility]    Hyperlipidemia     Hypertension     Postsurgical hypothyroidism     Reflux     Syncope and collapse 2013    known recurring syncope and collapse - has had a complete workup from a Neurological specialist, from a Cardiac Specialist with Electrophysiologist and all testing completely negative; Idiopathic Syncope does run in the family    Urge incontinence of urine        Past Surgical History:   Procedure Laterality Date    breast reduction      COLONOSCOPY  11/6/2013    + polyp - Repeat in 5 years    COLONOSCOPY  02/13/2017    + polyp - repeat in 5 years - Dr. Gutiérrez    HYSTERECTOMY      Partial - still has ovaries    ROTATOR CUFF REPAIR      SPINE SURGERY  8/22/13    bone spur to cervical spine removed    THYROIDECTOMY Right     Partial - right lobe removed    TONSILLECTOMY         Family History   Problem Relation Age of Onset    Colon cancer Paternal Grandmother     Colon cancer Maternal Grandmother     Cancer Maternal Grandmother         colon    Hyperlipidemia Mother     Stroke Mother 78        stents placed for carotid blockage; multiple TIAs    Hypertension Father     Stroke Father 84    Parkinsonism Maternal Grandfather         passed in his 70's    Heart disease Paternal Grandfather     Asthma Sister         in her 50's    Hypertension Brother     Hypertension Sister     Hypertension Sister     Breast cancer Neg Hx     Ovarian cancer Neg Hx        Social History     Socioeconomic History    Marital  status:      Spouse name: Not on file    Number of children: Not on file    Years of education: Not on file    Highest education level: Not on file   Occupational History    Not on file   Social Needs    Financial resource strain: Not on file    Food insecurity:     Worry: Not on file     Inability: Not on file    Transportation needs:     Medical: Not on file     Non-medical: Not on file   Tobacco Use    Smoking status: Never Smoker    Smokeless tobacco: Never Used   Substance and Sexual Activity    Alcohol use: Yes    Drug use: No    Sexual activity: Yes   Lifestyle    Physical activity:     Days per week: Not on file     Minutes per session: Not on file    Stress: Not on file   Relationships    Social connections:     Talks on phone: Not on file     Gets together: Not on file     Attends Anabaptism service: Not on file     Active member of club or organization: Not on file     Attends meetings of clubs or organizations: Not on file     Relationship status: Not on file   Other Topics Concern    Not on file   Social History Narrative    Not on file       Review of Systems   Constitutional: Negative for appetite change, chills, fatigue, fever and unexpected weight change.   HENT: Negative for congestion, ear pain, mouth sores, nosebleeds, postnasal drip, rhinorrhea, sinus pressure, sneezing, sore throat, trouble swallowing and voice change.    Eyes: Negative for photophobia, pain, discharge, redness, itching and visual disturbance.   Respiratory: Negative for cough, chest tightness and shortness of breath.    Cardiovascular: Negative for chest pain, palpitations and leg swelling.   Gastrointestinal: Negative for abdominal pain, blood in stool, constipation, diarrhea, nausea and vomiting.   Genitourinary: Negative for dysuria, frequency, hematuria and urgency.   Musculoskeletal: Positive for arthralgias. Negative for back pain, joint swelling and myalgias.   Skin: Negative for color change and  "rash.   Allergic/Immunologic: Negative for immunocompromised state.   Neurological: Negative for dizziness, seizures, syncope, weakness and headaches.   Hematological: Negative for adenopathy. Does not bruise/bleed easily.   Psychiatric/Behavioral: Negative for agitation, dysphoric mood, sleep disturbance and suicidal ideas. The patient is not nervous/anxious.          Objective:     Vitals:    05/22/19 0811   BP: 122/78   Pulse: 71   Resp: 18   Temp: 98.1 °F (36.7 °C)   TempSrc: Oral   SpO2: 97%   Weight: 89.4 kg (197 lb)   Height: 5' 9" (1.753 m)          Physical Exam   Constitutional: She is oriented to person, place, and time. She appears well-developed and well-nourished. No distress.   Body mass index is 29.09 kg/m².     HENT:   Head: Normocephalic and atraumatic.   Right Ear: External ear normal.   Left Ear: External ear normal.   Nose: Nose normal.   Mouth/Throat: Oropharynx is clear and moist. No oropharyngeal exudate.   Eyes: Pupils are equal, round, and reactive to light. EOM are normal.   Neck: Normal range of motion. Neck supple. No JVD present. No tracheal deviation present. No thyromegaly present.   Cardiovascular: Normal rate, regular rhythm and normal heart sounds.   No murmur heard.  Pulmonary/Chest: Effort normal and breath sounds normal. No stridor. No respiratory distress.   Abdominal: Soft. She exhibits no distension and no mass. There is no tenderness. There is no guarding.   Musculoskeletal: Normal range of motion. She exhibits no edema.   Lymphadenopathy:     She has no cervical adenopathy.   Neurological: She is alert and oriented to person, place, and time. No cranial nerve deficit. Coordination normal.   Skin: Skin is warm and dry. No rash noted. She is not diaphoretic.   Psychiatric: She has a normal mood and affect.         Assessment:         ICD-10-CM ICD-9-CM   1. Essential hypertension I10 401.9   2. Postsurgical hypothyroidism E89.0 244.0   3. Hyperlipidemia, unspecified " hyperlipidemia type E78.5 272.4   4. Hormone replacement therapy (HRT) Z79.890 V07.4   5. Polyarthralgia M25.50 719.49   6. History of hepatitis C Z86.19 V12.09   7. Diabetes mellitus screening Z13.1 V77.1   8. Screening for deficiency anemia Z13.0 V78.1   9. Need for Streptococcus pneumoniae vaccination Z23 V03.82       Plan:       Essential hypertension  -  Controlled on present medications.  Will send refill requests when needed.  Recheck in 6 months.    Postsurgical hypothyroidism  -  Continue present medication.  Due to recheck thyroid labs in November  -     TSH; Future; Expected date: 11/11/2019  -     T4, free; Future; Expected date: 11/11/2019    Hyperlipidemia, unspecified hyperlipidemia type  -  Continue to work on lifestyle modifications -r echeck lipid panel in November  -     Lipid panel; Future; Expected date: 11/11/2019    Hormone replacement therapy (HRT)  -  Followed by GYN MD    Polyarthralgia  -  Followed by orthopedic MD    History of hepatitis C  -     Pneumococcal Polysaccharide Vaccine (23 Valent) (SQ/IM)    Diabetes mellitus screening  -     Comprehensive metabolic panel; Future; Expected date: 11/11/2019    Screening for deficiency anemia  -     CBC auto differential; Future; Expected date: 11/11/2019    Need for Streptococcus pneumoniae vaccination  -     Pneumococcal Polysaccharide Vaccine (23 Valent) (SQ/IM)      Follow up in about 6 months (around 11/22/2019) for fasting labs and wellness exam.     Patient's Medications   New Prescriptions    VARICELLA-ZOSTER GE-AS01B, PF, (SHINGRIX, PF,) 50 MCG/0.5 ML INJECTION    Inject 0.5 mLs into the muscle once. for 1 dose   Previous Medications    AMLODIPINE (NORVASC) 5 MG TABLET    Take 1 tablet (5 mg total) by mouth once daily.    BUTALBITAL-ACETAMINOPHEN-CAFFEINE -40 MG (FIORICET, ESGIC) -40 MG PER TABLET    Take 1 tablet by mouth every 4 (four) hours as needed.    CALCIUM CRB,CIT/D3/MIN34/NELLY (CITRACAL + BONE DENSITY ORAL)     Citracal + Bone Density    DULOXETINE (CYMBALTA) 60 MG CAPSULE    Take 1 capsule (60 mg total) by mouth once daily.    ESTRADIOL (ESTRACE) 1 MG TABLET    Take 1 mg by mouth once daily.    MELOXICAM (MOBIC) 15 MG TABLET    Take 1 tablet (15 mg total) by mouth daily as needed.    MULTIVITAMIN/IRON/FOLIC ACID (CENTRUM WOMEN ORAL)    Centrum    SYNTHROID 50 MCG TABLET    Take 1 tablet (50 mcg total) by mouth once daily.    TRAMADOL (ULTRAM) 50 MG TABLET    Take 50 mg by mouth every 6 (six) hours.    VALSARTAN-HYDROCHLOROTHIAZIDE (DIOVAN-HCT) 160-25 MG PER TABLET    Take 1 tablet by mouth once daily.   Modified Medications    No medications on file   Discontinued Medications    SCOPOLAMINE (TRANSDERM-SCOP) 1.3-1.5 MG (1 MG OVER 3 DAYS)    Place 1 patch onto the skin every 72 hours.

## 2019-05-23 ENCOUNTER — IMMUNIZATION (OUTPATIENT)
Dept: PHARMACY | Facility: CLINIC | Age: 64
End: 2019-05-23
Payer: COMMERCIAL

## 2019-05-28 ENCOUNTER — TELEPHONE (OUTPATIENT)
Dept: ADMINISTRATIVE | Facility: HOSPITAL | Age: 64
End: 2019-05-28

## 2019-06-10 ENCOUNTER — PATIENT MESSAGE (OUTPATIENT)
Dept: FAMILY MEDICINE | Facility: CLINIC | Age: 64
End: 2019-06-10

## 2019-06-10 DIAGNOSIS — E03.9 HYPOTHYROIDISM, UNSPECIFIED TYPE: ICD-10-CM

## 2019-06-10 DIAGNOSIS — Z01.818 PREOPERATIVE GENERAL PHYSICAL EXAMINATION: Primary | ICD-10-CM

## 2019-06-26 ENCOUNTER — TELEPHONE (OUTPATIENT)
Dept: FAMILY MEDICINE | Facility: CLINIC | Age: 64
End: 2019-06-26

## 2019-06-26 NOTE — TELEPHONE ENCOUNTER
Patient will need to get CBC, CMP, TSH labs and then an EKG in office for clearance so call patient to get labs done prior to visit on 7/18/2019 so I have results at time of visit - make sure labs are done after 7/14 so that labs are within 30 days of the surgery.

## 2019-07-01 DIAGNOSIS — E89.0 POSTSURGICAL HYPOTHYROIDISM: ICD-10-CM

## 2019-07-01 DIAGNOSIS — F43.0 STRESS REACTION CAUSING MIXED DISTURBANCE OF EMOTION AND CONDUCT: ICD-10-CM

## 2019-07-01 DIAGNOSIS — I10 ESSENTIAL HYPERTENSION: ICD-10-CM

## 2019-07-01 RX ORDER — LEVOTHYROXINE SODIUM 50 UG/1
50 TABLET ORAL DAILY
Qty: 90 TABLET | Refills: 0 | Status: SHIPPED | OUTPATIENT
Start: 2019-07-01 | End: 2019-09-26 | Stop reason: SDUPTHER

## 2019-07-01 RX ORDER — AMLODIPINE BESYLATE 5 MG/1
5 TABLET ORAL DAILY
Qty: 90 TABLET | Refills: 0 | Status: SHIPPED | OUTPATIENT
Start: 2019-07-01 | End: 2019-09-25 | Stop reason: SDUPTHER

## 2019-07-01 RX ORDER — VALSARTAN AND HYDROCHLOROTHIAZIDE 160; 25 MG/1; MG/1
1 TABLET ORAL DAILY
Qty: 90 TABLET | Refills: 0 | Status: SHIPPED | OUTPATIENT
Start: 2019-07-01 | End: 2019-09-25 | Stop reason: SDUPTHER

## 2019-07-01 RX ORDER — DULOXETIN HYDROCHLORIDE 60 MG/1
60 CAPSULE, DELAYED RELEASE ORAL DAILY
Qty: 90 CAPSULE | Refills: 0 | Status: SHIPPED | OUTPATIENT
Start: 2019-07-01 | End: 2019-09-25 | Stop reason: SDUPTHER

## 2019-07-15 ENCOUNTER — LAB VISIT (OUTPATIENT)
Dept: LAB | Facility: HOSPITAL | Age: 64
End: 2019-07-15
Attending: NURSE PRACTITIONER
Payer: COMMERCIAL

## 2019-07-15 DIAGNOSIS — E03.9 HYPOTHYROIDISM, UNSPECIFIED TYPE: ICD-10-CM

## 2019-07-15 DIAGNOSIS — Z01.818 PREOPERATIVE GENERAL PHYSICAL EXAMINATION: ICD-10-CM

## 2019-07-15 LAB
ALBUMIN SERPL BCP-MCNC: 3.5 G/DL (ref 3.5–5.2)
ALP SERPL-CCNC: 80 U/L (ref 55–135)
ALT SERPL W/O P-5'-P-CCNC: 15 U/L (ref 10–44)
ANION GAP SERPL CALC-SCNC: 8 MMOL/L (ref 8–16)
AST SERPL-CCNC: 19 U/L (ref 10–40)
BASOPHILS # BLD AUTO: 0.03 K/UL (ref 0–0.2)
BASOPHILS NFR BLD: 0.5 % (ref 0–1.9)
BILIRUB SERPL-MCNC: 0.4 MG/DL (ref 0.1–1)
BUN SERPL-MCNC: 12 MG/DL (ref 8–23)
CALCIUM SERPL-MCNC: 8.9 MG/DL (ref 8.7–10.5)
CHLORIDE SERPL-SCNC: 101 MMOL/L (ref 95–110)
CO2 SERPL-SCNC: 31 MMOL/L (ref 23–29)
CREAT SERPL-MCNC: 0.8 MG/DL (ref 0.5–1.4)
DIFFERENTIAL METHOD: ABNORMAL
EOSINOPHIL # BLD AUTO: 0.3 K/UL (ref 0–0.5)
EOSINOPHIL NFR BLD: 4.4 % (ref 0–8)
ERYTHROCYTE [DISTWIDTH] IN BLOOD BY AUTOMATED COUNT: 12.8 % (ref 11.5–14.5)
EST. GFR  (AFRICAN AMERICAN): >60 ML/MIN/1.73 M^2
EST. GFR  (NON AFRICAN AMERICAN): >60 ML/MIN/1.73 M^2
GLUCOSE SERPL-MCNC: 96 MG/DL (ref 70–110)
HCT VFR BLD AUTO: 44.8 % (ref 37–48.5)
HGB BLD-MCNC: 14.7 G/DL (ref 12–16)
IMM GRANULOCYTES # BLD AUTO: 0.03 K/UL (ref 0–0.04)
IMM GRANULOCYTES NFR BLD AUTO: 0.5 % (ref 0–0.5)
LYMPHOCYTES # BLD AUTO: 1.5 K/UL (ref 1–4.8)
LYMPHOCYTES NFR BLD: 25 % (ref 18–48)
MCH RBC QN AUTO: 29.3 PG (ref 27–31)
MCHC RBC AUTO-ENTMCNC: 32.8 G/DL (ref 32–36)
MCV RBC AUTO: 89 FL (ref 82–98)
MONOCYTES # BLD AUTO: 0.6 K/UL (ref 0.3–1)
MONOCYTES NFR BLD: 9.9 % (ref 4–15)
NEUTROPHILS # BLD AUTO: 3.5 K/UL (ref 1.8–7.7)
NEUTROPHILS NFR BLD: 59.7 % (ref 38–73)
NRBC BLD-RTO: 0 /100 WBC
PLATELET # BLD AUTO: 359 K/UL (ref 150–350)
PMV BLD AUTO: 10.6 FL (ref 9.2–12.9)
POTASSIUM SERPL-SCNC: 3.7 MMOL/L (ref 3.5–5.1)
PROT SERPL-MCNC: 6.4 G/DL (ref 6–8.4)
RBC # BLD AUTO: 5.02 M/UL (ref 4–5.4)
SODIUM SERPL-SCNC: 140 MMOL/L (ref 136–145)
T4 FREE SERPL-MCNC: 0.9 NG/DL (ref 0.71–1.51)
TSH SERPL DL<=0.005 MIU/L-ACNC: 2.32 UIU/ML (ref 0.4–4)
WBC # BLD AUTO: 5.88 K/UL (ref 3.9–12.7)

## 2019-07-15 PROCEDURE — 84443 ASSAY THYROID STIM HORMONE: CPT

## 2019-07-15 PROCEDURE — 85025 COMPLETE CBC W/AUTO DIFF WBC: CPT

## 2019-07-15 PROCEDURE — 84439 ASSAY OF FREE THYROXINE: CPT

## 2019-07-15 PROCEDURE — 80053 COMPREHEN METABOLIC PANEL: CPT

## 2019-07-15 PROCEDURE — 36415 COLL VENOUS BLD VENIPUNCTURE: CPT | Mod: PO

## 2019-07-18 ENCOUNTER — OFFICE VISIT (OUTPATIENT)
Dept: FAMILY MEDICINE | Facility: CLINIC | Age: 64
End: 2019-07-18
Payer: COMMERCIAL

## 2019-07-18 VITALS
OXYGEN SATURATION: 97 % | WEIGHT: 194 LBS | HEIGHT: 69 IN | HEART RATE: 85 BPM | RESPIRATION RATE: 20 BRPM | DIASTOLIC BLOOD PRESSURE: 78 MMHG | BODY MASS INDEX: 28.73 KG/M2 | TEMPERATURE: 98 F | SYSTOLIC BLOOD PRESSURE: 116 MMHG

## 2019-07-18 DIAGNOSIS — Z79.890 HORMONE REPLACEMENT THERAPY (HRT): ICD-10-CM

## 2019-07-18 DIAGNOSIS — E78.5 HYPERLIPIDEMIA, UNSPECIFIED HYPERLIPIDEMIA TYPE: ICD-10-CM

## 2019-07-18 DIAGNOSIS — M15.9 OSTEOARTHRITIS OF MULTIPLE JOINTS, UNSPECIFIED OSTEOARTHRITIS TYPE: ICD-10-CM

## 2019-07-18 DIAGNOSIS — E89.0 POSTSURGICAL HYPOTHYROIDISM: ICD-10-CM

## 2019-07-18 DIAGNOSIS — Z12.39 BREAST CANCER SCREENING: ICD-10-CM

## 2019-07-18 DIAGNOSIS — M25.50 POLYARTHRALGIA: ICD-10-CM

## 2019-07-18 DIAGNOSIS — I10 ESSENTIAL HYPERTENSION: ICD-10-CM

## 2019-07-18 DIAGNOSIS — Z01.818 PREOPERATIVE GENERAL PHYSICAL EXAMINATION: Primary | ICD-10-CM

## 2019-07-18 PROCEDURE — 3078F DIAST BP <80 MM HG: CPT | Mod: CPTII,S$GLB,, | Performed by: NURSE PRACTITIONER

## 2019-07-18 PROCEDURE — 93010 ELECTROCARDIOGRAM REPORT: CPT | Mod: S$GLB,,, | Performed by: INTERNAL MEDICINE

## 2019-07-18 PROCEDURE — 93010 EKG 12-LEAD: ICD-10-PCS | Mod: S$GLB,,, | Performed by: INTERNAL MEDICINE

## 2019-07-18 PROCEDURE — 93005 ELECTROCARDIOGRAM TRACING: CPT | Mod: S$GLB,,, | Performed by: NURSE PRACTITIONER

## 2019-07-18 PROCEDURE — 3078F PR MOST RECENT DIASTOLIC BLOOD PRESSURE < 80 MM HG: ICD-10-PCS | Mod: CPTII,S$GLB,, | Performed by: NURSE PRACTITIONER

## 2019-07-18 PROCEDURE — 93005 EKG 12-LEAD: ICD-10-PCS | Mod: S$GLB,,, | Performed by: NURSE PRACTITIONER

## 2019-07-18 PROCEDURE — 3008F PR BODY MASS INDEX (BMI) DOCUMENTED: ICD-10-PCS | Mod: CPTII,S$GLB,, | Performed by: NURSE PRACTITIONER

## 2019-07-18 PROCEDURE — 3074F SYST BP LT 130 MM HG: CPT | Mod: CPTII,S$GLB,, | Performed by: NURSE PRACTITIONER

## 2019-07-18 PROCEDURE — 99999 PR PBB SHADOW E&M-EST. PATIENT-LVL V: CPT | Mod: PBBFAC,,, | Performed by: NURSE PRACTITIONER

## 2019-07-18 PROCEDURE — 3008F BODY MASS INDEX DOCD: CPT | Mod: CPTII,S$GLB,, | Performed by: NURSE PRACTITIONER

## 2019-07-18 PROCEDURE — 3074F PR MOST RECENT SYSTOLIC BLOOD PRESSURE < 130 MM HG: ICD-10-PCS | Mod: CPTII,S$GLB,, | Performed by: NURSE PRACTITIONER

## 2019-07-18 PROCEDURE — 99214 OFFICE O/P EST MOD 30 MIN: CPT | Mod: S$GLB,,, | Performed by: NURSE PRACTITIONER

## 2019-07-18 PROCEDURE — 99999 PR PBB SHADOW E&M-EST. PATIENT-LVL V: ICD-10-PCS | Mod: PBBFAC,,, | Performed by: NURSE PRACTITIONER

## 2019-07-18 PROCEDURE — 99214 PR OFFICE/OUTPT VISIT, EST, LEVL IV, 30-39 MIN: ICD-10-PCS | Mod: S$GLB,,, | Performed by: NURSE PRACTITIONER

## 2019-07-18 NOTE — PROGRESS NOTES
"Subjective:       Patient ID: Gudelia Cerrato is a 63 y.o. female.    Chief Complaint: Medical Clearance (hip replacement on 8/13)    Patient is a 63-year-old white female with hypertension, hyperlipidemia that is on lifestyle modifications only, hypothyroidism, history of hepatitis C, on hormone replacement therapy, chronic idiopathic recurrent syncope and collapse and osteoarthritis that is here today for Preoperative EXAM for Clearance.  Patient is scheduled to have a LEFT Total Hip Replacement with Dr. Rony Steel on 8/13/2019.     Patient has Hypertension that is controlled on Amlodipine 5 mg daily and Valsartan /25 mg daily.   /78   Pulse 85   Temp 98.4 °F (36.9 °C) (Oral)   Resp 20   Ht 5' 9" (1.753 m)   Wt 88 kg (194 lb)   SpO2 97%   BMI 28.65 kg/m²      Patient has hyperlipidemia and currently on lifestyle modifications only. Her 10-year calculated risk per pooled-cohort equation was 4.2% in November 2018 when cholesterol last checked..  Patient has been tried on STATIN therapy in the past and unable to tolerate.  Advised patient that because 10-year risk is < 5% no medication is required but if she wants to lower cardiovascular risks, I am willing to start her on Zetia to decrease levels.  Patient declines medication at this time.  She will be due to recheck cholesterol levels in November 2019.     Patient has postsurgical hypothyroidism and takes brand Synthroid 50 mcg daily and controlled on this dose.  Thyroid levels are well controlled on medication.  See results below.     Patient has a history of depression back in 2015 while getting the divorce and was on Cymbalta 60 mg daily.  Patient had reported that when Cymbalta changed to the generic it was no longer effective so she required the brand Cymbalta back in 2015 and was  able to get off the medicine around 2016.  Patient reported at October 2018 visit that she was again having increased stress/anxiety/and depressed mood " due to family stressors daily with parents Health conditions.  And she also reported polyarthralgias that she found the Cymbalta helped with chronic musculoskeletal pain as well as mood in the past so I started patient back on Cymbalta 30 mg at October 2018 visit and titrated up to 60 mg daily.  Patient reports she was unable to afford the BRAND Cymbalta as it would have been over $800  So has been taking generic Cymbalta 60 mg daily.  The polyarthralgias are still present and followed by Orthopedic MD - she reports she will need a hip replacement and is being seen by Dr. Juan Steel.     Patient is on hormone replacement therapy by her gyn MD.     Patient has Chronic Recurrent Idiopathic Syncopal Episodes.  She has had workups from Neurologist and Cardiologist and Electrophysiologists in past and all testing negative.  Reports family history of idiopathic syncope.    PREOPERATIVE LABS:  -  CBC within acceptable range.  -  CMP within acceptable range.  -  Thyroid levels are controlled on present medication    PREOP EKG:  Normal Sinus Rhythm; low voltage QRS - borderline EKG -   - attached to faxed paperwork to Dr. Juan Steel.    Patient never smoked and no lung disease history so I did not require a CXR for clearance.    Component      Latest Ref Rng & Units 7/15/2019 11/19/2018   WBC      3.90 - 12.70 K/uL 5.88 5.15   RBC      4.00 - 5.40 M/uL 5.02 4.76   Hemoglobin      12.0 - 16.0 g/dL 14.7 14.6   Hematocrit      37.0 - 48.5 % 44.8 43.4   MCV      82 - 98 fL 89 91   MCH      27.0 - 31.0 pg 29.3 30.7   MCHC      32.0 - 36.0 g/dL 32.8 33.6   RDW      11.5 - 14.5 % 12.8 12.3   Platelets      150 - 350 K/uL 359 (H) 293   MPV      9.2 - 12.9 fL 10.6 11.0   Immature Granulocytes      0.0 - 0.5 % 0.5 0.6 (H)   Gran # (ANC)      1.8 - 7.7 K/uL 3.5 2.5   Immature Grans (Abs)      0.00 - 0.04 K/uL 0.03 0.03   Lymph #      1.0 - 4.8 K/uL 1.5 1.8   Mono #      0.3 - 1.0 K/uL 0.6 0.5   Eos #      0.0 - 0.5 K/uL  0.3 0.3   Baso #      0.00 - 0.20 K/uL 0.03 0.03   nRBC      0 /100 WBC 0 0   Gran%      38.0 - 73.0 % 59.7 49.3   Lymph%      18.0 - 48.0 % 25.0 34.6   Mono%      4.0 - 15.0 % 9.9 9.7   Eosinophil%      0.0 - 8.0 % 4.4 5.2   Basophil%      0.0 - 1.9 % 0.5 0.6   Differential Method       Automated Automated   Sodium      136 - 145 mmol/L 140 140   Potassium      3.5 - 5.1 mmol/L 3.7 3.7   Chloride      95 - 110 mmol/L 101 102   CO2      23 - 29 mmol/L 31 (H) 33 (H)   Glucose      70 - 110 mg/dL 96 98   BUN, Bld      8 - 23 mg/dL 12 15   Creatinine      0.5 - 1.4 mg/dL 0.8 0.8   Calcium      8.7 - 10.5 mg/dL 8.9 8.8   PROTEIN TOTAL      6.0 - 8.4 g/dL 6.4 6.5   Albumin      3.5 - 5.2 g/dL 3.5 3.6   BILIRUBIN TOTAL      0.1 - 1.0 mg/dL 0.4 0.7   Alkaline Phosphatase      55 - 135 U/L 80 69   AST      10 - 40 U/L 19 22   ALT      10 - 44 U/L 15 14   Anion Gap      8 - 16 mmol/L 8 5 (L)   eGFR if African American      >60 mL/min/1.73 m:2 >60.0 >60.0   eGFR if non African American      >60 mL/min/1.73 m:2 >60.0 >60.0   Cholesterol      120 - 199 mg/dL  218 (H)   Triglycerides      30 - 150 mg/dL  154 (H)   HDL      40 - 75 mg/dL  64   LDL Cholesterol External      63.0 - 159.0 mg/dL  123.2   Hdl/Cholesterol Ratio      20.0 - 50.0 %  29.4   Total Cholesterol/HDL Ratio      2.0 - 5.0  3.4   Non-HDL Cholesterol      mg/dL  154   TSH      0.400 - 4.000 uIU/mL 2.323 3.179   Free T4      0.71 - 1.51 ng/dL 0.90 0.97     Current Outpatient Medications   Medication Sig Dispense Refill    amLODIPine (NORVASC) 5 MG tablet Take 1 tablet (5 mg total) by mouth once daily. 90 tablet 0    butalbital-acetaminophen-caffeine -40 mg (FIORICET, ESGIC) -40 mg per tablet Take 1 tablet by mouth every 4 (four) hours as needed.  2    calcium crb,cit/D3/min34/nancy (CITRACAL + BONE DENSITY ORAL) Citracal + Bone Density      DULoxetine (CYMBALTA) 60 MG capsule Take 1 capsule (60 mg total) by mouth once daily. 90 capsule 0     estradiol (ESTRACE) 1 MG tablet Take 1 mg by mouth once daily.  6    meloxicam (MOBIC) 15 MG tablet Take 1 tablet (15 mg total) by mouth daily as needed. 90 tablet 0    multivitamin/iron/folic acid (CENTRUM WOMEN ORAL) Centrum      SYNTHROID 50 mcg tablet Take 1 tablet (50 mcg total) by mouth once daily. 90 tablet 0    traMADol (ULTRAM) 50 mg tablet Take 50 mg by mouth every 6 (six) hours.      traMADol (ULTRAM) 50 mg tablet Take 1 tablet (50 mg total) by mouth every 8 to 12 hours as needed. 60 tablet 0    valsartan-hydrochlorothiazide (DIOVAN-HCT) 160-25 mg per tablet Take 1 tablet by mouth once daily. 90 tablet 0     No current facility-administered medications for this visit.        Past Medical History:   Diagnosis Date    Depression     GERD (gastroesophageal reflux disease)     Hepatitis C 2000    Treated with Combination Interferon and told she is Cured by Dr[facility] Brice Correa [facility]    Hyperlipidemia     Hypertension     Postsurgical hypothyroidism     Reflux     Syncope and collapse 2013    known recurring syncope and collapse - has had a complete workup from a Neurological specialist, from a Cardiac Specialist with Electrophysiologist and all testing completely negative; Idiopathic Syncope does run in the family    Urge incontinence of urine        Past Surgical History:   Procedure Laterality Date    breast reduction      COLONOSCOPY  11/6/2013    + polyp - Repeat in 5 years    COLONOSCOPY  02/13/2017    + polyp - repeat in 5 years - Dr. Gutiérrez    HYSTERECTOMY      Partial - still has ovaries    ROTATOR CUFF REPAIR      SPINE SURGERY  8/22/13    bone spur to cervical spine removed    THYROIDECTOMY Right     Partial - right lobe removed    TONSILLECTOMY         Family History   Problem Relation Age of Onset    Colon cancer Paternal Grandmother     Colon cancer Maternal Grandmother     Cancer Maternal Grandmother         colon    Hyperlipidemia Mother     Stroke Mother 78         stents placed for carotid blockage; multiple TIAs    Hypertension Father     Stroke Father 84    Parkinsonism Maternal Grandfather         passed in his 70's    Heart disease Paternal Grandfather     Asthma Sister         in her 50's    Hypertension Brother     Hypertension Sister     Hypertension Sister     Breast cancer Neg Hx     Ovarian cancer Neg Hx        Social History     Socioeconomic History    Marital status:      Spouse name: Not on file    Number of children: Not on file    Years of education: Not on file    Highest education level: Not on file   Occupational History    Not on file   Social Needs    Financial resource strain: Hard    Food insecurity:     Worry: Never true     Inability: Never true    Transportation needs:     Medical: No     Non-medical: No   Tobacco Use    Smoking status: Never Smoker    Smokeless tobacco: Never Used   Substance and Sexual Activity    Alcohol use: Yes     Frequency: 2-3 times a week     Drinks per session: 1 or 2     Binge frequency: Never    Drug use: No    Sexual activity: Yes   Lifestyle    Physical activity:     Days per week: 2 days     Minutes per session: 20 min    Stress: Only a little   Relationships    Social connections:     Talks on phone: More than three times a week     Gets together: More than three times a week     Attends Sabianist service: Not on file     Active member of club or organization: Yes     Attends meetings of clubs or organizations: More than 4 times per year     Relationship status:    Other Topics Concern    Not on file   Social History Narrative    Not on file       Review of Systems   Constitutional: Positive for activity change. Negative for unexpected weight change.   HENT: Positive for rhinorrhea. Negative for hearing loss and trouble swallowing.    Eyes: Negative for discharge and visual disturbance.   Respiratory: Negative for chest tightness and wheezing.    Cardiovascular: Negative for  "chest pain and palpitations.   Gastrointestinal: Negative for blood in stool, constipation, diarrhea and vomiting.   Endocrine: Negative for polydipsia and polyuria.   Genitourinary: Negative for difficulty urinating, dysuria, hematuria and menstrual problem.   Musculoskeletal: Positive for arthralgias, joint swelling and neck pain.   Neurological: Negative for weakness and headaches.   Psychiatric/Behavioral: Negative for confusion and dysphoric mood.         Objective:     Vitals:    07/18/19 1604   BP: (!) 148/88   Pulse: 85   Resp: 20   Temp: 98.4 °F (36.9 °C)   TempSrc: Oral   SpO2: 97%   Weight: 88 kg (194 lb)   Height: 5' 9" (1.753 m)          Physical Exam   Constitutional: She is oriented to person, place, and time. She appears well-developed and well-nourished. No distress.   Body mass index is 28.65 kg/m².     HENT:   Head: Normocephalic and atraumatic.   Right Ear: External ear normal.   Left Ear: External ear normal.   Nose: Nose normal.   Mouth/Throat: Oropharynx is clear and moist. No oropharyngeal exudate.   Eyes: Pupils are equal, round, and reactive to light. EOM are normal.   Neck: Normal range of motion. Neck supple. No JVD present. No tracheal deviation present. No thyromegaly present.   Cardiovascular: Normal rate, regular rhythm and normal heart sounds.   No murmur heard.  Pulmonary/Chest: Effort normal and breath sounds normal. No stridor. No respiratory distress.   Abdominal: Soft. She exhibits no distension and no mass. There is no tenderness. There is no guarding.   Musculoskeletal: Normal range of motion. She exhibits no edema.        Legs:  Left hip with decreased ROM and pain present - scheduled to have total hip replacement.  Ambulates with limp   Lymphadenopathy:     She has no cervical adenopathy.   Neurological: She is alert and oriented to person, place, and time. No cranial nerve deficit. Coordination normal.   Skin: Skin is warm and dry. No rash noted. She is not diaphoretic. "   Psychiatric: She has a normal mood and affect.         Assessment:         ICD-10-CM ICD-9-CM   1. Preoperative general physical examination Z01.818 V72.83   2. Essential hypertension I10 401.9   3. Postsurgical hypothyroidism E89.0 244.0   4. Hyperlipidemia, unspecified hyperlipidemia type E78.5 272.4   5. Hormone replacement therapy (HRT) Z79.890 V07.4   6. Polyarthralgia M25.50 719.49   7. Osteoarthritis of multiple joints, unspecified osteoarthritis type M15.9 715.89   8. Breast cancer screening Z12.31 V76.10       Plan:       Preoperative general physical examination  -  Patient is medically cleared for surgery from PCP perspective.  I will send over a copy of my progress note with clearance letter to Dr. Juan Steel as well as give patient copy.    Essential hypertension  -  Controlled on Valsartan HCT and Amlodipine at present doses - recheck in November.    Postsurgical hypothyroidism  -  Continue Synthroid at present dose - recheck in Nov.     Hyperlipidemia, unspecified hyperlipidemia type  -  Continue to work on lifestyle modifications.    Hormone replacement therapy (HRT)  -  Continue HRT by GYN MD    Polyarthralgia  -  Followed by Dr. Steel    Osteoarthritis of multiple joints, unspecified osteoarthritis type  -  Followed by Dr. Steel    Breast cancer screening  -  Gave printed out order to have done at [facility] per patient request.  -     Mammo Digital Screening Bilat w/ [facility]; Future; Expected date: 07/18/2019      Follow up in about 4 months (around 11/18/2019) for keep scheduled appts for fasting labs and WELLNESS EXAM.     Patient's Medications   New Prescriptions    No medications on file   Previous Medications    AMLODIPINE (NORVASC) 5 MG TABLET    Take 1 tablet (5 mg total) by mouth once daily.    BUTALBITAL-ACETAMINOPHEN-CAFFEINE -40 MG (FIORICET, ESGIC) -40 MG PER TABLET    Take 1 tablet by mouth every 4 (four) hours as needed.    CALCIUM CRB,CIT/D3/MIN34/NELLY (CITRACAL  + BONE DENSITY ORAL)    Citracal + Bone Density    DULOXETINE (CYMBALTA) 60 MG CAPSULE    Take 1 capsule (60 mg total) by mouth once daily.    ESTRADIOL (ESTRACE) 1 MG TABLET    Take 1 mg by mouth once daily.    MELOXICAM (MOBIC) 15 MG TABLET    Take 1 tablet (15 mg total) by mouth daily as needed.    MULTIVITAMIN/IRON/FOLIC ACID (CENTRUM WOMEN ORAL)    Centrum    SYNTHROID 50 MCG TABLET    Take 1 tablet (50 mcg total) by mouth once daily.    TRAMADOL (ULTRAM) 50 MG TABLET    Take 1 tablet (50 mg total) by mouth every 8 to 12 hours as needed.    VALSARTAN-HYDROCHLOROTHIAZIDE (DIOVAN-HCT) 160-25 MG PER TABLET    Take 1 tablet by mouth once daily.   Modified Medications    No medications on file   Discontinued Medications    TRAMADOL (ULTRAM) 50 MG TABLET    Take 50 mg by mouth every 6 (six) hours.

## 2019-07-19 ENCOUNTER — TELEPHONE (OUTPATIENT)
Dept: FAMILY MEDICINE | Facility: CLINIC | Age: 64
End: 2019-07-19

## 2019-07-19 NOTE — TELEPHONE ENCOUNTER
----- Message from Tosha Sanders sent at 7/19/2019 10:18 AM CDT -----  Regarding: EKG ORDER  Please place and link EKG order to the EKG or Doctor appointment scheduled on 07/18/19 thanks. Tosha [phone]

## 2019-07-26 ENCOUNTER — TELEPHONE (OUTPATIENT)
Dept: ADMINISTRATIVE | Facility: HOSPITAL | Age: 64
End: 2019-07-26

## 2019-09-25 DIAGNOSIS — F43.0 STRESS REACTION CAUSING MIXED DISTURBANCE OF EMOTION AND CONDUCT: ICD-10-CM

## 2019-09-25 DIAGNOSIS — I10 ESSENTIAL HYPERTENSION: ICD-10-CM

## 2019-09-25 RX ORDER — DULOXETIN HYDROCHLORIDE 60 MG/1
60 CAPSULE, DELAYED RELEASE ORAL DAILY
Qty: 90 CAPSULE | Refills: 0 | Status: SHIPPED | OUTPATIENT
Start: 2019-09-25 | End: 2019-12-22 | Stop reason: SDUPTHER

## 2019-09-25 RX ORDER — VALSARTAN AND HYDROCHLOROTHIAZIDE 160; 25 MG/1; MG/1
1 TABLET ORAL DAILY
Qty: 90 TABLET | Refills: 0 | Status: SHIPPED | OUTPATIENT
Start: 2019-09-25 | End: 2019-12-22 | Stop reason: SDUPTHER

## 2019-09-25 RX ORDER — AMLODIPINE BESYLATE 5 MG/1
5 TABLET ORAL DAILY
Qty: 90 TABLET | Refills: 0 | Status: SHIPPED | OUTPATIENT
Start: 2019-09-25 | End: 2019-12-22 | Stop reason: SDUPTHER

## 2019-09-26 DIAGNOSIS — E89.0 POSTSURGICAL HYPOTHYROIDISM: ICD-10-CM

## 2019-09-26 RX ORDER — LEVOTHYROXINE SODIUM 50 UG/1
50 TABLET ORAL DAILY
Qty: 90 TABLET | Refills: 0 | Status: SHIPPED | OUTPATIENT
Start: 2019-09-26 | End: 2019-12-23 | Stop reason: SDUPTHER

## 2019-10-11 ENCOUNTER — IMMUNIZATION (OUTPATIENT)
Dept: PHARMACY | Facility: CLINIC | Age: 64
End: 2019-10-11
Payer: COMMERCIAL

## 2019-11-18 ENCOUNTER — LAB VISIT (OUTPATIENT)
Dept: LAB | Facility: HOSPITAL | Age: 64
End: 2019-11-18
Attending: NURSE PRACTITIONER
Payer: COMMERCIAL

## 2019-11-18 DIAGNOSIS — E89.0 POSTSURGICAL HYPOTHYROIDISM: ICD-10-CM

## 2019-11-18 DIAGNOSIS — Z13.0 SCREENING FOR DEFICIENCY ANEMIA: ICD-10-CM

## 2019-11-18 DIAGNOSIS — E78.5 HYPERLIPIDEMIA, UNSPECIFIED HYPERLIPIDEMIA TYPE: ICD-10-CM

## 2019-11-18 DIAGNOSIS — Z13.1 DIABETES MELLITUS SCREENING: ICD-10-CM

## 2019-11-18 LAB
ALBUMIN SERPL BCP-MCNC: 3.6 G/DL (ref 3.5–5.2)
ALP SERPL-CCNC: 79 U/L (ref 55–135)
ALT SERPL W/O P-5'-P-CCNC: 16 U/L (ref 10–44)
ANION GAP SERPL CALC-SCNC: 7 MMOL/L (ref 8–16)
AST SERPL-CCNC: 17 U/L (ref 10–40)
BASOPHILS # BLD AUTO: 0.06 K/UL (ref 0–0.2)
BASOPHILS NFR BLD: 1.1 % (ref 0–1.9)
BILIRUB SERPL-MCNC: 0.3 MG/DL (ref 0.1–1)
BUN SERPL-MCNC: 19 MG/DL (ref 8–23)
CALCIUM SERPL-MCNC: 8.8 MG/DL (ref 8.7–10.5)
CHLORIDE SERPL-SCNC: 103 MMOL/L (ref 95–110)
CHOLEST SERPL-MCNC: 240 MG/DL (ref 120–199)
CHOLEST/HDLC SERPL: 3.9 {RATIO} (ref 2–5)
CO2 SERPL-SCNC: 31 MMOL/L (ref 23–29)
CREAT SERPL-MCNC: 0.7 MG/DL (ref 0.5–1.4)
DIFFERENTIAL METHOD: ABNORMAL
EOSINOPHIL # BLD AUTO: 0.2 K/UL (ref 0–0.5)
EOSINOPHIL NFR BLD: 3.9 % (ref 0–8)
ERYTHROCYTE [DISTWIDTH] IN BLOOD BY AUTOMATED COUNT: 14 % (ref 11.5–14.5)
EST. GFR  (AFRICAN AMERICAN): >60 ML/MIN/1.73 M^2
EST. GFR  (NON AFRICAN AMERICAN): >60 ML/MIN/1.73 M^2
GLUCOSE SERPL-MCNC: 109 MG/DL (ref 70–110)
HCT VFR BLD AUTO: 44.5 % (ref 37–48.5)
HDLC SERPL-MCNC: 62 MG/DL (ref 40–75)
HDLC SERPL: 25.8 % (ref 20–50)
HGB BLD-MCNC: 14.1 G/DL (ref 12–16)
IMM GRANULOCYTES # BLD AUTO: 0.04 K/UL (ref 0–0.04)
IMM GRANULOCYTES NFR BLD AUTO: 0.7 % (ref 0–0.5)
LDLC SERPL CALC-MCNC: 155.6 MG/DL (ref 63–159)
LYMPHOCYTES # BLD AUTO: 1.8 K/UL (ref 1–4.8)
LYMPHOCYTES NFR BLD: 32.2 % (ref 18–48)
MCH RBC QN AUTO: 27.2 PG (ref 27–31)
MCHC RBC AUTO-ENTMCNC: 31.7 G/DL (ref 32–36)
MCV RBC AUTO: 86 FL (ref 82–98)
MONOCYTES # BLD AUTO: 0.5 K/UL (ref 0.3–1)
MONOCYTES NFR BLD: 8.8 % (ref 4–15)
NEUTROPHILS # BLD AUTO: 3 K/UL (ref 1.8–7.7)
NEUTROPHILS NFR BLD: 53.3 % (ref 38–73)
NONHDLC SERPL-MCNC: 178 MG/DL
NRBC BLD-RTO: 0 /100 WBC
PLATELET # BLD AUTO: 313 K/UL (ref 150–350)
PMV BLD AUTO: 10.4 FL (ref 9.2–12.9)
POTASSIUM SERPL-SCNC: 3.6 MMOL/L (ref 3.5–5.1)
PROT SERPL-MCNC: 6.6 G/DL (ref 6–8.4)
RBC # BLD AUTO: 5.18 M/UL (ref 4–5.4)
SODIUM SERPL-SCNC: 141 MMOL/L (ref 136–145)
T4 FREE SERPL-MCNC: 0.98 NG/DL (ref 0.71–1.51)
TRIGL SERPL-MCNC: 112 MG/DL (ref 30–150)
TSH SERPL DL<=0.005 MIU/L-ACNC: 2.66 UIU/ML (ref 0.4–4)
WBC # BLD AUTO: 5.68 K/UL (ref 3.9–12.7)

## 2019-11-18 PROCEDURE — 80053 COMPREHEN METABOLIC PANEL: CPT

## 2019-11-18 PROCEDURE — 85025 COMPLETE CBC W/AUTO DIFF WBC: CPT

## 2019-11-18 PROCEDURE — 84443 ASSAY THYROID STIM HORMONE: CPT

## 2019-11-18 PROCEDURE — 80061 LIPID PANEL: CPT

## 2019-11-18 PROCEDURE — 36415 COLL VENOUS BLD VENIPUNCTURE: CPT | Mod: PO

## 2019-11-18 PROCEDURE — 84439 ASSAY OF FREE THYROXINE: CPT

## 2019-11-26 DIAGNOSIS — M17.0 OSTEOARTHRITIS OF BOTH KNEES, UNSPECIFIED OSTEOARTHRITIS TYPE: ICD-10-CM

## 2019-11-26 RX ORDER — MELOXICAM 15 MG/1
15 TABLET ORAL DAILY PRN
Qty: 90 TABLET | Refills: 0 | Status: SHIPPED | OUTPATIENT
Start: 2019-11-26 | End: 2020-04-21 | Stop reason: SDUPTHER

## 2019-11-26 NOTE — TELEPHONE ENCOUNTER
----- Message from Jaci Jay sent at 11/26/2019 10:29 AM CST -----  Called patient to reschedule appt , states only day off , will not be off in December any due to last month in the year , but will need refills on meloxicam (MOBIC) 15 MG . Please call her # [phone]. thanks

## 2019-12-06 ENCOUNTER — IMMUNIZATION (OUTPATIENT)
Dept: PHARMACY | Facility: CLINIC | Age: 64
End: 2019-12-06
Payer: COMMERCIAL

## 2019-12-11 ENCOUNTER — OFFICE VISIT (OUTPATIENT)
Dept: FAMILY MEDICINE | Facility: CLINIC | Age: 64
End: 2019-12-11
Payer: COMMERCIAL

## 2019-12-11 VITALS
OXYGEN SATURATION: 97 % | WEIGHT: 195.31 LBS | SYSTOLIC BLOOD PRESSURE: 110 MMHG | BODY MASS INDEX: 28.93 KG/M2 | TEMPERATURE: 98 F | HEART RATE: 61 BPM | DIASTOLIC BLOOD PRESSURE: 70 MMHG | HEIGHT: 69 IN

## 2019-12-11 DIAGNOSIS — E78.2 MIXED HYPERLIPIDEMIA: ICD-10-CM

## 2019-12-11 DIAGNOSIS — F43.0 STRESS REACTION CAUSING MIXED DISTURBANCE OF EMOTION AND CONDUCT: ICD-10-CM

## 2019-12-11 DIAGNOSIS — I10 ESSENTIAL HYPERTENSION: ICD-10-CM

## 2019-12-11 DIAGNOSIS — E89.0 POSTSURGICAL HYPOTHYROIDISM: ICD-10-CM

## 2019-12-11 DIAGNOSIS — Z79.890 HORMONE REPLACEMENT THERAPY (HRT): ICD-10-CM

## 2019-12-11 DIAGNOSIS — M17.0 PRIMARY OSTEOARTHRITIS OF BOTH KNEES: ICD-10-CM

## 2019-12-11 DIAGNOSIS — Z00.00 ANNUAL PHYSICAL EXAM: Primary | ICD-10-CM

## 2019-12-11 DIAGNOSIS — F33.0 MILD RECURRENT MAJOR DEPRESSION: ICD-10-CM

## 2019-12-11 PROCEDURE — 3078F PR MOST RECENT DIASTOLIC BLOOD PRESSURE < 80 MM HG: ICD-10-PCS | Mod: CPTII,S$GLB,, | Performed by: INTERNAL MEDICINE

## 2019-12-11 PROCEDURE — 3074F PR MOST RECENT SYSTOLIC BLOOD PRESSURE < 130 MM HG: ICD-10-PCS | Mod: CPTII,S$GLB,, | Performed by: INTERNAL MEDICINE

## 2019-12-11 PROCEDURE — 99396 PR PREVENTIVE VISIT,EST,40-64: ICD-10-PCS | Mod: S$GLB,,, | Performed by: INTERNAL MEDICINE

## 2019-12-11 PROCEDURE — 99999 PR PBB SHADOW E&M-EST. PATIENT-LVL III: CPT | Mod: PBBFAC,,, | Performed by: INTERNAL MEDICINE

## 2019-12-11 PROCEDURE — 3078F DIAST BP <80 MM HG: CPT | Mod: CPTII,S$GLB,, | Performed by: INTERNAL MEDICINE

## 2019-12-11 PROCEDURE — 99396 PREV VISIT EST AGE 40-64: CPT | Mod: S$GLB,,, | Performed by: INTERNAL MEDICINE

## 2019-12-11 PROCEDURE — 3074F SYST BP LT 130 MM HG: CPT | Mod: CPTII,S$GLB,, | Performed by: INTERNAL MEDICINE

## 2019-12-11 PROCEDURE — 99999 PR PBB SHADOW E&M-EST. PATIENT-LVL III: ICD-10-PCS | Mod: PBBFAC,,, | Performed by: INTERNAL MEDICINE

## 2019-12-11 NOTE — ASSESSMENT & PLAN NOTE
Stable on cymbalta 60 mg daily, works pretty well for.  No SI/HI/panic attacks.  Is  and has strained relationships with kids.

## 2019-12-11 NOTE — ASSESSMENT & PLAN NOTE
Stable, was on statin in past with muscle cramps.  Cholesterol went up since last readings.  Has not been exercising, has been eating poorly.

## 2019-12-11 NOTE — ASSESSMENT & PLAN NOTE
Hasn't been doing well on diet, needs to start exercise.  Nonsmoker, drinks 1-2 times per month.  Lives in [facility].

## 2019-12-11 NOTE — PROGRESS NOTES
[facility] Note    Chief Complaint      Chief Complaint   Patient presents with    Follow-up     6m f/u from [place]      History of Present Illness      Gudelia Cerrato is a 64 y.o. female who presents today for annual preventative visit.  Patient comes to appointment alone.     Problem List Items Addressed This Visit     Hyperlipidemia    Current Assessment & Plan     Stable, was on statin in past with muscle cramps.  Cholesterol went up since last readings.  Has not been exercising, has been eating poorly.         Postsurgical hypothyroidism    Current Assessment & Plan     TFT's in 11/2019, no S/S of hypo/hyperthyroidism.         Hypertension    Current Assessment & Plan     BP controlled on valsartan/HCTZ 160/25 mg daily and norvasc 5 mg daily.  No CP/SOB/HA.         Hormone replacement therapy (HRT)    Overview     Treated by Dr. Aaliyah Bearden         Current Assessment & Plan     Sees Dr. Noble at , stable on estrace.         Osteoarthritis of both knees    Current Assessment & Plan     Stable on mobic PRN, works well for knee pain.  Sees Dr. Finn.         Annual physical exam - Primary    Current Assessment & Plan     Hasn't been doing well on diet, needs to start exercise.  Nonsmoker, drinks 1-2 times per month.  Lives in [facility].         Mild recurrent major depression    Current Assessment & Plan     Stable on cymbalta 60 mg daily, works pretty well for.  No SI/HI/panic attacks.  Is  and has strained relationships with kids.         RESOLVED: Stress reaction causing mixed disturbance of emotion and conduct    Current Assessment & Plan     Stable on cymbalta.  No SI/HI/panic attacks.               Health Maintenance   Topic Date Due    Mammogram  07/23/2020    Colonoscopy  02/13/2022    TETANUS VACCINE  03/08/2022    Lipid Panel  11/18/2024    Pneumococcal Vaccine (Medium Risk)  Completed    Hepatitis C Screening  Addressed       Past Medical History:    Diagnosis Date    Depression     GERD (gastroesophageal reflux disease)     Hepatitis C 2000    Treated with Combination Interferon and told she is C[facility] by [facility]    Hyperlipidemia     Hypertension     Postsurgical hypothyroidism     Reflux     Syncope and collapse 2013    known recurring syncope and collapse - has had a complete workup from a Neurological specialist, from a Cardiac Specialist with Electrophysiologist and all testing completely negative; Idiopathic Syncope does run in the family    Urge incontinence of urine        Past Surgical History:   Procedure Laterality Date    breast reduction      BREAST SURGERY  8/1996    Reduction    COLONOSCOPY  11/6/2013    + polyp - Repeat in 5 years    COLONOSCOPY  02/13/2017    + polyp - repeat in 5 years - Dr. Gutiérrez    EYE SURGERY  4/4/19    Right eye-Laser to lower pressure    HYSTERECTOMY      Partial - still has ovaries    JOINT REPLACEMENT  8/13/2019    Left Hip    ROTATOR CUFF REPAIR      SPINE SURGERY  8/22/13    bone spur to cervical spine removed    THYROIDECTOMY Right     Partial - right lobe removed    TONSILLECTOMY      TOTAL HIP ARTHROPLASTY         family history includes Asthma in her sister; Cancer in her maternal grandmother and paternal grandmother; Colon cancer in her maternal grandmother and paternal grandmother; Diabetes in her maternal aunt and maternal aunt; Heart disease in her maternal aunt and paternal grandfather; Hyperlipidemia in her father and mother; Hypertension in her brother, father, sister, and sister; Mental illness in her sister; Parkinsonism in her maternal grandfather; Stroke (age of onset: 78) in her mother; Stroke (age of onset: 84) in her father; Vision loss in her father and mother.    Social History     Tobacco Use    Smoking status: Never Smoker    Smokeless tobacco: Never Used   Substance Use Topics    Alcohol use: Yes     Alcohol/week: 3.0 standard drinks     Types: 1  Glasses of wine, 1 Cans of beer, 1 Shots of liquor per week     Frequency: 2-4 times a month     Drinks per session: 1 or 2     Binge frequency: Never     Comment: Social drinker, amount depends on social engagements    Drug use: No       Review of Systems   Constitutional: Negative for chills and fever.   HENT: Negative for congestion, hearing loss and sore throat.    Eyes: Positive for discharge. Negative for blurred vision.   Respiratory: Negative for cough, shortness of breath and wheezing.    Cardiovascular: Negative for chest pain and palpitations.   Gastrointestinal: Negative for blood in stool, constipation, diarrhea, nausea and vomiting.   Genitourinary: Negative for dysuria and hematuria.   Musculoskeletal: Negative for falls, myalgias and neck pain.   Skin: Negative for itching and rash.   Neurological: Negative for dizziness, weakness and headaches.   Endo/Heme/Allergies: Negative for polydipsia.        Outpatient Encounter Medications as of 12/11/2019   Medication Sig Note Dispense Refill    amLODIPine (NORVASC) 5 MG tablet Take 1 tablet (5 mg total) by mouth once daily.  90 tablet 0    butalbital-acetaminophen-caffeine -40 mg (FIORICET, ESGIC) -40 mg per tablet Take 1 tablet by mouth every 4 (four) hours as needed. 12/17/2015: Received from: External Pharmacy  2    calcium crb,cit/D3/min34/nancy (CITRACAL + BONE DENSITY ORAL) Citracal + Bone Density       DULoxetine (CYMBALTA) 60 MG capsule Take 1 capsule (60 mg total) by mouth once daily.  90 capsule 0    estradiol (ESTRACE) 1 MG tablet TAKE 1 TABLET BY MOUTH ONCE DAILY  90 tablet 5    meloxicam (MOBIC) 15 MG tablet Take 1 tablet (15 mg total) by mouth daily as needed.  90 tablet 0    multivitamin/iron/folic acid (CENTRUM WOMEN ORAL) Centrum       SYNTHROID 50 mcg tablet Take 1 tablet (50 mcg total) by mouth once daily.  90 tablet 0    valsartan-hydrochlorothiazide (DIOVAN-HCT) 160-25 mg per tablet Take 1 tablet by mouth once  "daily.  90 tablet 0    [DISCONTINUED] estradiol (ESTRACE) 1 MG tablet Take 1 mg by mouth once daily. 3/29/2017: Received from: External Pharmacy Received Sig: TAKE 1 TABLET BY MOUTH DAILY  6    [DISCONTINUED] traMADol (ULTRAM) 50 mg tablet Take 1 tablet (50 mg total) by mouth every 8 to 12 hours as needed.  60 tablet 0     No facility-administered encounter medications on file as of 12/11/2019.         Review of patient's allergies indicates:   Allergen Reactions    Percocet [oxycodone-acetaminophen] Itching    Pravastatin Other (See Comments)     myalgias       Physical Exam      Vital Signs  Temp: 98.1 °F (36.7 °C)  Temp src: Oral  Pulse: 61  SpO2: 97 %  BP: 110/70  BP Location: Left arm  Patient Position: Sitting  Pain Score: 0-No pain  Height and Weight  Height: 5' 9" (175.3 cm)  Weight: 88.6 kg (195 lb 5.2 oz)  BSA (Calculated - sq m): 2.08 sq meters  BMI (Calculated): 28.8  Weight in (lb) to have BMI = 25: 168.9]    Physical Exam   Constitutional: She is oriented to person, place, and time. She appears well-developed and well-nourished.   HENT:   Head: Normocephalic and atraumatic.   Right Ear: External ear normal.   Left Ear: External ear normal.   Eyes: Right eye exhibits no discharge. Left eye exhibits no discharge.   Neck: Normal range of motion. No thyromegaly present.   Cardiovascular: Normal rate, regular rhythm, normal heart sounds and intact distal pulses.   No murmur heard.  Pulmonary/Chest: Effort normal and breath sounds normal. No respiratory distress.   Abdominal: Soft. Bowel sounds are normal. She exhibits no distension. There is no tenderness.   Musculoskeletal: Normal range of motion. She exhibits no deformity.   Neurological: She is alert and oriented to person, place, and time.   Skin: Skin is warm and dry. No rash noted.   Psychiatric: She has a normal mood and affect. Her behavior is normal.        Laboratory:  CBC:  Recent Labs   Lab Result Units 11/18/19  0904   WBC K/uL 5.68   RBC " M/uL 5.18   Hemoglobin g/dL 14.1   Hematocrit % 44.5   Platelets K/uL 313   Mean Corpuscular Volume fL 86   Mean Corpuscular Hemoglobin pg 27.2   Mean Corpuscular Hemoglobin Conc g/dL 31.7*     CMP:  Recent Labs   Lab Result Units 11/18/19  0904   Glucose mg/dL 109   Calcium mg/dL 8.8   Albumin g/dL 3.6   Total Protein g/dL 6.6   Sodium mmol/L 141   Potassium mmol/L 3.6   CO2 mmol/L 31*   Chloride mmol/L 103   BUN, Bld mg/dL 19   Alkaline Phosphatase U/L 79   ALT U/L 16   AST U/L 17   Total Bilirubin mg/dL 0.3     URINALYSIS:  No results for input(s): COLORU, CLARITYU, SPECGRAV, PHUR, PROTEINUA, GLUCOSEU, BILIRUBINCON, BLOODU, WBCU, RBCU, BACTERIA, MUCUS, NITRITE, LEUKOCYTESUR, UROBILINOGEN, HYALINECASTS in the last 2160 hours.   LIPIDS:  Recent Labs   Lab Result Units 11/18/19  0904   TSH uIU/mL 2.656   HDL mg/dL 62   Cholesterol mg/dL 240*   Triglycerides mg/dL 112   LDL Cholesterol mg/dL 155.6   Hdl/Cholesterol Ratio % 25.8   Non-HDL Cholesterol mg/dL 178   Total Cholesterol/HDL Ratio  3.9     TSH:  Recent Labs   Lab Result Units 11/18/19  0904   TSH uIU/mL 2.656     A1C:  No results for input(s): HGBA1C in the last 2160 hours.    Radiology:  No new imaging    Assessment/Plan     Gudelia Cerrato is a 64 y.o.female with:    1. Annual physical exam    2. Essential hypertension    3. Stress reaction causing mixed disturbance of emotion and conduct    4. Mixed hyperlipidemia    5. Primary osteoarthritis of both knees    6. Hormone replacement therapy (HRT)    7. Mild recurrent major depression    8. Postsurgical hypothyroidism    -Continue current medications and maintain follow up with specialists.  Return to clinic in 1 year.      Jonna Engle MD  [facility] [place]      Answers for HPI/ROS submitted by the patient on 12/6/2019   activity change: No  unexpected weight change: No  rhinorrhea: Yes  trouble swallowing: No  visual disturbance: Yes  chest tightness: No  polyuria: No  difficulty  urinating: No  menstrual problem: No  joint swelling: Yes  arthralgias: Yes  confusion: No  dysphoric mood: No

## 2019-12-22 DIAGNOSIS — I10 ESSENTIAL HYPERTENSION: ICD-10-CM

## 2019-12-22 DIAGNOSIS — F43.0 STRESS REACTION CAUSING MIXED DISTURBANCE OF EMOTION AND CONDUCT: ICD-10-CM

## 2019-12-23 DIAGNOSIS — E89.0 POSTSURGICAL HYPOTHYROIDISM: ICD-10-CM

## 2019-12-23 RX ORDER — DULOXETIN HYDROCHLORIDE 60 MG/1
60 CAPSULE, DELAYED RELEASE ORAL DAILY
Qty: 90 CAPSULE | Refills: 0 | Status: SHIPPED | OUTPATIENT
Start: 2019-12-23 | End: 2020-03-18 | Stop reason: SDUPTHER

## 2019-12-23 RX ORDER — AMLODIPINE BESYLATE 5 MG/1
5 TABLET ORAL DAILY
Qty: 90 TABLET | Refills: 0 | Status: SHIPPED | OUTPATIENT
Start: 2019-12-23 | End: 2020-03-18 | Stop reason: SDUPTHER

## 2019-12-23 RX ORDER — LEVOTHYROXINE SODIUM 50 UG/1
50 TABLET ORAL DAILY
Qty: 90 TABLET | Refills: 0 | Status: SHIPPED | OUTPATIENT
Start: 2019-12-23 | End: 2020-03-18 | Stop reason: SDUPTHER

## 2019-12-23 RX ORDER — VALSARTAN AND HYDROCHLOROTHIAZIDE 160; 25 MG/1; MG/1
1 TABLET ORAL DAILY
Qty: 90 TABLET | Refills: 0 | Status: SHIPPED | OUTPATIENT
Start: 2019-12-23 | End: 2020-03-18 | Stop reason: SDUPTHER

## 2020-03-16 PROBLEM — Z00.00 ANNUAL PHYSICAL EXAM: Status: RESOLVED | Noted: 2019-12-11 | Resolved: 2020-03-16

## 2020-03-18 DIAGNOSIS — F43.0 STRESS REACTION CAUSING MIXED DISTURBANCE OF EMOTION AND CONDUCT: ICD-10-CM

## 2020-03-18 DIAGNOSIS — I10 ESSENTIAL HYPERTENSION: ICD-10-CM

## 2020-03-18 DIAGNOSIS — E89.0 POSTSURGICAL HYPOTHYROIDISM: ICD-10-CM

## 2020-03-18 RX ORDER — AMLODIPINE BESYLATE 5 MG/1
5 TABLET ORAL DAILY
Qty: 90 TABLET | Refills: 0 | Status: SHIPPED | OUTPATIENT
Start: 2020-03-18 | End: 2020-06-11 | Stop reason: SDUPTHER

## 2020-03-18 RX ORDER — DULOXETIN HYDROCHLORIDE 60 MG/1
60 CAPSULE, DELAYED RELEASE ORAL DAILY
Qty: 90 CAPSULE | Refills: 0 | Status: SHIPPED | OUTPATIENT
Start: 2020-03-18 | End: 2020-06-12 | Stop reason: SDUPTHER

## 2020-03-18 RX ORDER — VALSARTAN AND HYDROCHLOROTHIAZIDE 160; 25 MG/1; MG/1
1 TABLET ORAL DAILY
Qty: 90 TABLET | Refills: 0 | Status: SHIPPED | OUTPATIENT
Start: 2020-03-18 | End: 2020-06-11 | Stop reason: SDUPTHER

## 2020-03-18 RX ORDER — LEVOTHYROXINE SODIUM 50 UG/1
50 TABLET ORAL DAILY
Qty: 90 TABLET | Refills: 0 | Status: SHIPPED | OUTPATIENT
Start: 2020-03-18 | End: 2020-06-12 | Stop reason: SDUPTHER

## 2020-04-21 DIAGNOSIS — M17.0 OSTEOARTHRITIS OF BOTH KNEES, UNSPECIFIED OSTEOARTHRITIS TYPE: ICD-10-CM

## 2020-04-21 RX ORDER — MELOXICAM 15 MG/1
15 TABLET ORAL DAILY PRN
Qty: 90 TABLET | Refills: 0 | Status: SHIPPED | OUTPATIENT
Start: 2020-04-21 | End: 2020-07-06 | Stop reason: SDUPTHER

## 2020-06-11 DIAGNOSIS — I10 ESSENTIAL HYPERTENSION: ICD-10-CM

## 2020-06-11 RX ORDER — AMLODIPINE BESYLATE 5 MG/1
5 TABLET ORAL DAILY
Qty: 90 TABLET | Refills: 0 | Status: SHIPPED | OUTPATIENT
Start: 2020-06-11 | End: 2020-09-14 | Stop reason: SDUPTHER

## 2020-06-11 RX ORDER — VALSARTAN AND HYDROCHLOROTHIAZIDE 160; 25 MG/1; MG/1
1 TABLET ORAL DAILY
Qty: 90 TABLET | Refills: 0 | Status: SHIPPED | OUTPATIENT
Start: 2020-06-11 | End: 2020-09-14 | Stop reason: SDUPTHER

## 2020-06-11 NOTE — TELEPHONE ENCOUNTER
Call patient - advise her that she is due for 6 month follow up for blood pressure check and medication followup/refills - please schedule.

## 2020-06-12 DIAGNOSIS — F43.0 STRESS REACTION CAUSING MIXED DISTURBANCE OF EMOTION AND CONDUCT: ICD-10-CM

## 2020-06-12 DIAGNOSIS — E89.0 POSTSURGICAL HYPOTHYROIDISM: ICD-10-CM

## 2020-06-12 RX ORDER — LEVOTHYROXINE SODIUM 50 UG/1
50 TABLET ORAL DAILY
Qty: 90 TABLET | Refills: 0 | Status: SHIPPED | OUTPATIENT
Start: 2020-06-12 | End: 2020-09-15 | Stop reason: SDUPTHER

## 2020-06-12 RX ORDER — DULOXETIN HYDROCHLORIDE 60 MG/1
60 CAPSULE, DELAYED RELEASE ORAL DAILY
Qty: 90 CAPSULE | Refills: 0 | Status: SHIPPED | OUTPATIENT
Start: 2020-06-12 | End: 2020-09-14 | Stop reason: SDUPTHER

## 2020-07-06 DIAGNOSIS — M17.0 OSTEOARTHRITIS OF BOTH KNEES, UNSPECIFIED OSTEOARTHRITIS TYPE: ICD-10-CM

## 2020-07-07 RX ORDER — MELOXICAM 15 MG/1
15 TABLET ORAL DAILY PRN
Qty: 90 TABLET | Refills: 0 | Status: SHIPPED | OUTPATIENT
Start: 2020-07-07 | End: 2020-09-24 | Stop reason: SDUPTHER

## 2020-07-08 ENCOUNTER — TELEPHONE (OUTPATIENT)
Dept: ADMINISTRATIVE | Facility: HOSPITAL | Age: 65
End: 2020-07-08

## 2020-07-08 ENCOUNTER — OFFICE VISIT (OUTPATIENT)
Dept: FAMILY MEDICINE | Facility: CLINIC | Age: 65
End: 2020-07-08
Payer: COMMERCIAL

## 2020-07-08 ENCOUNTER — PATIENT OUTREACH (OUTPATIENT)
Dept: ADMINISTRATIVE | Facility: HOSPITAL | Age: 65
End: 2020-07-08

## 2020-07-08 VITALS
SYSTOLIC BLOOD PRESSURE: 122 MMHG | OXYGEN SATURATION: 98 % | HEIGHT: 68 IN | RESPIRATION RATE: 18 BRPM | WEIGHT: 201.94 LBS | HEART RATE: 63 BPM | DIASTOLIC BLOOD PRESSURE: 78 MMHG | TEMPERATURE: 98 F | BODY MASS INDEX: 30.61 KG/M2

## 2020-07-08 DIAGNOSIS — Z13.0 SCREENING FOR DEFICIENCY ANEMIA: ICD-10-CM

## 2020-07-08 DIAGNOSIS — E78.2 MIXED HYPERLIPIDEMIA: ICD-10-CM

## 2020-07-08 DIAGNOSIS — Z79.890 HORMONE REPLACEMENT THERAPY (HRT): ICD-10-CM

## 2020-07-08 DIAGNOSIS — M25.50 POLYARTHRALGIA: ICD-10-CM

## 2020-07-08 DIAGNOSIS — E89.0 POSTSURGICAL HYPOTHYROIDISM: ICD-10-CM

## 2020-07-08 DIAGNOSIS — M17.0 PRIMARY OSTEOARTHRITIS OF BOTH KNEES: ICD-10-CM

## 2020-07-08 DIAGNOSIS — F43.0 STRESS REACTION CAUSING MIXED DISTURBANCE OF EMOTION AND CONDUCT: ICD-10-CM

## 2020-07-08 DIAGNOSIS — I10 ESSENTIAL HYPERTENSION: Primary | ICD-10-CM

## 2020-07-08 DIAGNOSIS — Z00.00 ENCOUNTER FOR BLOOD TEST FOR ROUTINE GENERAL PHYSICAL EXAMINATION: ICD-10-CM

## 2020-07-08 DIAGNOSIS — F33.0 MILD RECURRENT MAJOR DEPRESSION: ICD-10-CM

## 2020-07-08 DIAGNOSIS — Z13.1 DIABETES MELLITUS SCREENING: ICD-10-CM

## 2020-07-08 DIAGNOSIS — Z11.4 SCREENING FOR HIV WITHOUT PRESENCE OF RISK FACTORS: ICD-10-CM

## 2020-07-08 PROCEDURE — 99999 PR PBB SHADOW E&M-EST. PATIENT-LVL V: CPT | Mod: PBBFAC,,, | Performed by: NURSE PRACTITIONER

## 2020-07-08 PROCEDURE — 3074F PR MOST RECENT SYSTOLIC BLOOD PRESSURE < 130 MM HG: ICD-10-PCS | Mod: CPTII,S$GLB,, | Performed by: NURSE PRACTITIONER

## 2020-07-08 PROCEDURE — 99214 PR OFFICE/OUTPT VISIT, EST, LEVL IV, 30-39 MIN: ICD-10-PCS | Mod: S$GLB,,, | Performed by: NURSE PRACTITIONER

## 2020-07-08 PROCEDURE — 99214 OFFICE O/P EST MOD 30 MIN: CPT | Mod: S$GLB,,, | Performed by: NURSE PRACTITIONER

## 2020-07-08 PROCEDURE — 3008F BODY MASS INDEX DOCD: CPT | Mod: CPTII,S$GLB,, | Performed by: NURSE PRACTITIONER

## 2020-07-08 PROCEDURE — 3078F PR MOST RECENT DIASTOLIC BLOOD PRESSURE < 80 MM HG: ICD-10-PCS | Mod: CPTII,S$GLB,, | Performed by: NURSE PRACTITIONER

## 2020-07-08 PROCEDURE — 3074F SYST BP LT 130 MM HG: CPT | Mod: CPTII,S$GLB,, | Performed by: NURSE PRACTITIONER

## 2020-07-08 PROCEDURE — 3078F DIAST BP <80 MM HG: CPT | Mod: CPTII,S$GLB,, | Performed by: NURSE PRACTITIONER

## 2020-07-08 PROCEDURE — 3008F PR BODY MASS INDEX (BMI) DOCUMENTED: ICD-10-PCS | Mod: CPTII,S$GLB,, | Performed by: NURSE PRACTITIONER

## 2020-07-08 PROCEDURE — 99999 PR PBB SHADOW E&M-EST. PATIENT-LVL V: ICD-10-PCS | Mod: PBBFAC,,, | Performed by: NURSE PRACTITIONER

## 2020-07-08 NOTE — PROGRESS NOTES
"Subjective:       Patient ID: Gudelia Cerrato is a 64 y.o. female.    Chief Complaint: Hypertension (F/U)    Patient is a 64-year-old white female with hypertension, hyperlipidemia that is on lifestyle modifications only, postsurgical hypothyroidism, history of hepatitis C, on hormone replacement therapy, chronic idiopathic recurrent syncope and collapse and osteoarthritis that is here today for 6 month follow up.       Patient has Hypertension that is controlled on Amlodipine 5 mg daily and Valsartan /25 mg daily.   /78   Pulse 63   Temp 98.4 °F (36.9 °C) (Oral)   Resp 18   Ht 5' 8" (1.727 m)   Wt 91.6 kg (201 lb 15.1 oz)   SpO2 98%   BMI 30.71 kg/m²      Patient has hyperlipidemia and currently on lifestyle modifications only.  Patient has been tried on STATIN therapy in the past and unable to tolerate. She will be due to recheck cholesterol levels in December 2020.     Patient has postsurgical hypothyroidism and takes brand Synthroid 50 mcg daily and controlled on this dose when levels last checked in December 2019 - monitored levels yearly.     Patient has a history of depression back in 2015 while getting the divorce and was on Cymbalta 60 mg daily.  Patient had reported that when Cymbalta changed to the generic it was no longer effective so she required the brand Cymbalta back in 2015 and was  able to get off the medicine around 2016.  Patient reported at October 2018 visit that she was again having increased stress/anxiety/and depressed mood due to family stressors daily with parents Health conditions.  And she also reported polyarthralgias that she found the Cymbalta helped with chronic musculoskeletal pain as well as mood in the past so I started patient back on Cymbalta 30 mg at October 2018 visit and titrated up to 60 mg daily.  Patient reports she was unable to afford the BRAND Cymbalta as it would have been over $800  So has been taking generic Cymbalta 60 mg daily since October " 2018 and tolerating well.  Depression is controlled - no adverse side effects reported and states she is not ready to get off of medication.    Patient has polyarthralgias and takes Meloxicam daily.  She is also followed by Orthopedic MD Dr. Juan Steel.     Patient is on hormone replacement therapy by her gyn MD.     Patient has Chronic Recurrent Idiopathic Syncopal Episodes.  She has had workups from Neurologist and Cardiologist and Electrophysiologists in past and all testing negative.  Reports family history of idiopathic syncope.    Hypertension  This is a chronic problem. The current episode started more than 1 year ago. The problem has been waxing and waning since onset. The problem is controlled. Associated symptoms include anxiety, blurred vision, malaise/fatigue, neck pain and sweats. Pertinent negatives include no chest pain, headaches, orthopnea, palpitations, peripheral edema or PND. Agents associated with hypertension include estrogens and thyroid hormones. Risk factors for coronary artery disease include obesity, post-menopausal state and stress. Past treatments include calcium channel blockers, diuretics and angiotensin blockers. Compliance problems include exercise and psychosocial issues.  Identifiable causes of hypertension include a thyroid problem.       Current Outpatient Medications   Medication Sig Dispense Refill    amLODIPine (NORVASC) 5 MG tablet Take 1 tablet (5 mg total) by mouth once daily. 90 tablet 0    butalbital-acetaminophen-caffeine -40 mg (FIORICET, ESGIC) -40 mg per tablet Take 1 tablet by mouth every 4 (four) hours as needed.  2    calcium crb,cit/D3/min34/nancy (CITRACAL + BONE DENSITY ORAL) Citracal + Bone Density      DULoxetine (CYMBALTA) 60 MG capsule Take 1 capsule (60 mg total) by mouth once daily. 90 capsule 0    estradioL (ESTRACE) 1 MG tablet TAKE 1 TABLET BY MOUTH ONCE DAILY 90 tablet 5    meloxicam (MOBIC) 15 MG tablet Take 1 tablet (15 mg  total) by mouth daily as needed. 90 tablet 0    multivitamin/iron/folic acid (CENTRUM WOMEN ORAL) Centrum      SYNTHROID 50 mcg tablet Take 1 tablet (50 mcg total) by mouth once daily. 90 tablet 0    valsartan-hydrochlorothiazide (DIOVAN-HCT) 160-25 mg per tablet Take 1 tablet by mouth once daily. 90 tablet 0     No current facility-administered medications for this visit.        Past Medical History:   Diagnosis Date    Depression     GERD (gastroesophageal reflux disease)     Hepatitis C 2000    Treated with Combination Interferon and told she is Cured by [facility] Brice Correa [facility]    Hyperlipidemia     Hypertension     Postsurgical hypothyroidism     Reflux     Syncope and collapse 2013    known recurring syncope and collapse - has had a complete workup from a Neurological specialist, from a Cardiac Specialist with Electrophysiologist and all testing completely negative; Idiopathic Syncope does run in the family    Urge incontinence of urine        Past Surgical History:   Procedure Laterality Date    breast reduction      BREAST SURGERY  8/1996    Reduction    COLONOSCOPY  11/6/2013    + polyp - Repeat in 5 years    COLONOSCOPY  02/13/2017    + polyp - repeat in 5 years - Dr. Gutiérrez    EYE SURGERY  4/4/19    Right eye-Laser to lower pressure    HYSTERECTOMY      Partial - still has ovaries    JOINT REPLACEMENT  8/13/2019    Left Hip    ROTATOR CUFF REPAIR      SPINE SURGERY  8/22/13    bone spur to cervical spine removed    THYROIDECTOMY Right     Partial - right lobe removed    TONSILLECTOMY      TOTAL HIP ARTHROPLASTY         Family History   Problem Relation Age of Onset    Colon cancer Paternal Grandmother     Cancer Paternal Grandmother     Colon cancer Maternal Grandmother     Cancer Maternal Grandmother         colon    Hyperlipidemia Mother     Stroke Mother 78        stents placed for carotid blockage; multiple TIAs    Vision loss Mother     Hypertension Father      Stroke Father 84    Hyperlipidemia Father     Vision loss Father     Parkinsonism Maternal Grandfather         passed in his 70's    Heart disease Paternal Grandfather     Asthma Sister         in her 50's    Hypertension Brother     Hypertension Sister     Hypertension Sister     Mental illness Sister         Undiagnosed    Diabetes Maternal Aunt     Heart disease Maternal Aunt     Diabetes Maternal Aunt     Breast cancer Neg Hx     Ovarian cancer Neg Hx        Social History     Socioeconomic History    Marital status:      Spouse name: Not on file    Number of children: Not on file    Years of education: Not on file    Highest education level: Not on file   Occupational History    Not on file   Social Needs    Financial resource strain: Hard    Food insecurity     Worry: Never true     Inability: Never true    Transportation needs     Medical: No     Non-medical: No   Tobacco Use    Smoking status: Never Smoker    Smokeless tobacco: Never Used   Substance and Sexual Activity    Alcohol use: Yes     Alcohol/week: 3.0 standard drinks     Types: 1 Glasses of wine, 1 Cans of beer, 1 Shots of liquor per week     Frequency: 2-4 times a month     Drinks per session: 1 or 2     Binge frequency: Never     Comment: Social drinker, amount depends on social engagements    Drug use: No    Sexual activity: Yes     Partners: Male     Birth control/protection: Post-menopausal   Lifestyle    Physical activity     Days per week: 2 days     Minutes per session: 20 min    Stress: Only a little   Relationships    Social connections     Talks on phone: More than three times a week     Gets together: More than three times a week     Attends Confucianist service: Not on file     Active member of club or organization: Yes     Attends meetings of clubs or organizations: More than 4 times per year     Relationship status:    Other Topics Concern    Not on file   Social History Narrative    Not  "on file       Review of Systems   Constitutional: Positive for malaise/fatigue. Negative for appetite change, chills, fatigue, fever and unexpected weight change.   HENT: Negative for congestion, ear pain, mouth sores, nosebleeds, postnasal drip, rhinorrhea, sinus pressure, sneezing, sore throat, trouble swallowing and voice change.    Eyes: Positive for blurred vision. Negative for photophobia, pain, discharge, redness, itching and visual disturbance.   Respiratory: Negative for cough and chest tightness.    Cardiovascular: Negative for chest pain, palpitations, orthopnea, leg swelling and PND.   Gastrointestinal: Negative for abdominal pain, blood in stool, constipation, diarrhea, nausea and vomiting.   Genitourinary: Negative for dysuria, frequency, hematuria and urgency.   Musculoskeletal: Positive for myalgias and neck pain. Negative for arthralgias, back pain and joint swelling.        Chronic musculoskeletal pains   Skin: Negative for color change and rash.   Allergic/Immunologic: Negative for immunocompromised state.   Neurological: Negative for dizziness, seizures, syncope, weakness and headaches.   Hematological: Negative for adenopathy. Does not bruise/bleed easily.   Psychiatric/Behavioral: Negative for agitation, dysphoric mood, sleep disturbance and suicidal ideas. The patient is not nervous/anxious.          Objective:     Vitals:    07/08/20 1358 07/08/20 1426   BP: 120/86 122/78   BP Location: Left arm    Patient Position: Sitting    BP Method: Large (Manual)    Pulse: 63    Resp: 18    Temp: 98.4 °F (36.9 °C)    TempSrc: Oral    SpO2: 98%    Weight: 91.6 kg (201 lb 15.1 oz)    Height: 5' 8" (1.727 m)           Physical Exam  Constitutional:       General: She is not in acute distress.     Appearance: Normal appearance. She is well-developed. She is obese. She is not ill-appearing, toxic-appearing or diaphoretic.      Comments: + obesity with Body mass index is 30.71 kg/m².   HENT:      Head: " Normocephalic and atraumatic.      Right Ear: External ear normal.      Left Ear: External ear normal.      Nose: Nose normal.      Mouth/Throat:      Pharynx: No oropharyngeal exudate.   Eyes:      General: No scleral icterus.        Right eye: No discharge.         Left eye: No discharge.      Extraocular Movements: Extraocular movements intact.      Pupils: Pupils are equal, round, and reactive to light.   Neck:      Musculoskeletal: Normal range of motion and neck supple.      Thyroid: No thyromegaly.      Vascular: No JVD.      Trachea: No tracheal deviation.   Cardiovascular:      Rate and Rhythm: Normal rate and regular rhythm.      Heart sounds: Normal heart sounds. No murmur.   Pulmonary:      Effort: Pulmonary effort is normal. No respiratory distress.      Breath sounds: Normal breath sounds. No stridor. No wheezing, rhonchi or rales.   Abdominal:      General: There is no distension.      Palpations: Abdomen is soft. There is no mass.      Tenderness: There is no abdominal tenderness. There is no guarding.   Musculoskeletal: Normal range of motion.   Lymphadenopathy:      Cervical: No cervical adenopathy.   Skin:     General: Skin is warm and dry.      Findings: No rash.   Neurological:      Mental Status: She is alert and oriented to person, place, and time.      Cranial Nerves: No cranial nerve deficit.      Coordination: Coordination normal.   Psychiatric:         Mood and Affect: Mood normal.         Behavior: Behavior normal.         Thought Content: Thought content normal.         Judgment: Judgment normal.           Assessment:         ICD-10-CM ICD-9-CM   1. Essential hypertension  I10 401.9   2. Mixed hyperlipidemia  E78.2 272.2   3. 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 Mild recurrent major depression  F33.0 296.31   5. Primary osteoarthritis of both knees  M17.0 715.16   6. Polyarthralgia  M25.50 719.49   7. Hormone replacement therapy (HRT)   Z79.890 V07.4   8. Postsurgical hypothyroidism  E89.0 244.0   9. Encounter for blood test for routine general physical examination  Z00.00 V72.62   10. Screening for HIV without presence of risk factors  Z11.4 V73.89   11. Diabetes mellitus screening  Z13.1 V77.1   12. Screening for deficiency anemia  Z13.0 V78.1       Plan:       Essential hypertension  -  controlled on valsartan HCT and amlodipine at present doses.  Recheck in 5 months    Mixed hyperlipidemia  -  patient has statin intolerance.  Cholesterol levels were high in December 2019.  Advised on low-fat diet and will recheck levels in December 2020  -     Lipid Panel; Future; Expected date: 07/08/2020    Stress reaction causing mixed disturbance of emotion and conduct  -  controlled on Cymbalta at present dose    Mild recurrent major depression  -  controlled on Cymbalta present dose    Primary osteoarthritis of both knees  -  takes meloxicam daily and followed by orthopedic MD    Polyarthralgia  -  takes meloxicam daily and followed by orthopedic MD    Hormone replacement therapy (HRT)  -  followed by gyn MD    Postsurgical hypothyroidism  -  continue Synthroid at present dose and will check levels in December  -     TSH; Future; Expected date: 07/08/2020  -     T4, free; Future; Expected date: 07/08/2020    Encounter for blood test for routine general physical examination  -     CBC auto differential; Future; Expected date: 07/08/2020  -     Comprehensive metabolic panel; Future; Expected date: 07/08/2020  -     Lipid Panel; Future; Expected date: 07/08/2020  -     TSH; Future; Expected date: 07/08/2020  -     T4, free; Future; Expected date: 07/08/2020  -     HIV 1/2 Ag/Ab (4th Gen); Future; Expected date: 07/08/2020    Screening for HIV without presence of risk factors  -     HIV 1/2 Ag/Ab (4th Gen); Future; Expected date: 07/08/2020    Diabetes mellitus screening  -     Comprehensive metabolic panel; Future; Expected date: 07/08/2020    Screening for  deficiency anemia  -     CBC auto differential; Future; Expected date: 07/08/2020      Follow up in about 5 months (around 12/8/2020) for fasting labs and WELLNESS EXAM.     Patient's Medications   New Prescriptions    No medications on file   Previous Medications    AMLODIPINE (NORVASC) 5 MG TABLET    Take 1 tablet (5 mg total) by mouth once daily.    BUTALBITAL-ACETAMINOPHEN-CAFFEINE -40 MG (FIORICET, ESGIC) -40 MG PER TABLET    Take 1 tablet by mouth every 4 (four) hours as needed.    CALCIUM CRB,CIT/D3/MIN34/NELLY (CITRACAL + BONE DENSITY ORAL)    Citracal + Bone Density    DULOXETINE (CYMBALTA) 60 MG CAPSULE    Take 1 capsule (60 mg total) by mouth once daily.    ESTRADIOL (ESTRACE) 1 MG TABLET    TAKE 1 TABLET BY MOUTH ONCE DAILY    MELOXICAM (MOBIC) 15 MG TABLET    Take 1 tablet (15 mg total) by mouth daily as needed.    MULTIVITAMIN/IRON/FOLIC ACID (CENTRUM WOMEN ORAL)    Centrum    SYNTHROID 50 MCG TABLET    Take 1 tablet (50 mcg total) by mouth once daily.    VALSARTAN-HYDROCHLOROTHIAZIDE (DIOVAN-HCT) 160-25 MG PER TABLET    Take 1 tablet by mouth once daily.   Modified Medications    No medications on file   Discontinued Medications    No medications on file

## 2020-09-14 DIAGNOSIS — I10 ESSENTIAL HYPERTENSION: ICD-10-CM

## 2020-09-14 DIAGNOSIS — F43.0 STRESS REACTION CAUSING MIXED DISTURBANCE OF EMOTION AND CONDUCT: ICD-10-CM

## 2020-09-14 RX ORDER — DULOXETIN HYDROCHLORIDE 60 MG/1
60 CAPSULE, DELAYED RELEASE ORAL DAILY
Qty: 90 CAPSULE | Refills: 3 | Status: SHIPPED | OUTPATIENT
Start: 2020-09-14 | End: 2021-12-04 | Stop reason: SDUPTHER

## 2020-09-14 RX ORDER — VALSARTAN AND HYDROCHLOROTHIAZIDE 160; 25 MG/1; MG/1
1 TABLET ORAL DAILY
Qty: 90 TABLET | Refills: 0 | Status: SHIPPED | OUTPATIENT
Start: 2020-09-14 | End: 2020-12-11 | Stop reason: SDUPTHER

## 2020-09-14 RX ORDER — AMLODIPINE BESYLATE 5 MG/1
5 TABLET ORAL DAILY
Qty: 90 TABLET | Refills: 0 | Status: SHIPPED | OUTPATIENT
Start: 2020-09-14 | End: 2020-12-11 | Stop reason: SDUPTHER

## 2020-09-15 DIAGNOSIS — E89.0 POSTSURGICAL HYPOTHYROIDISM: ICD-10-CM

## 2020-09-16 RX ORDER — LEVOTHYROXINE SODIUM 50 UG/1
50 TABLET ORAL DAILY
Qty: 90 TABLET | Refills: 0 | Status: SHIPPED | OUTPATIENT
Start: 2020-09-16 | End: 2020-12-11 | Stop reason: SDUPTHER

## 2020-09-24 DIAGNOSIS — M17.0 OSTEOARTHRITIS OF BOTH KNEES, UNSPECIFIED OSTEOARTHRITIS TYPE: ICD-10-CM

## 2020-09-25 RX ORDER — MELOXICAM 15 MG/1
15 TABLET ORAL DAILY PRN
Qty: 90 TABLET | Refills: 1 | Status: SHIPPED | OUTPATIENT
Start: 2020-09-25 | End: 2020-12-11 | Stop reason: ALTCHOICE

## 2020-10-07 ENCOUNTER — PATIENT MESSAGE (OUTPATIENT)
Dept: FAMILY MEDICINE | Facility: CLINIC | Age: 65
End: 2020-10-07

## 2020-10-07 DIAGNOSIS — Z12.39 ENCOUNTER FOR SCREENING FOR MALIGNANT NEOPLASM OF BREAST, UNSPECIFIED SCREENING MODALITY: Primary | ICD-10-CM

## 2020-10-28 ENCOUNTER — PATIENT OUTREACH (OUTPATIENT)
Dept: ADMINISTRATIVE | Facility: HOSPITAL | Age: 65
End: 2020-10-28

## 2020-10-28 ENCOUNTER — TELEPHONE (OUTPATIENT)
Dept: ADMINISTRATIVE | Facility: HOSPITAL | Age: 65
End: 2020-10-28

## 2020-12-07 ENCOUNTER — LAB VISIT (OUTPATIENT)
Dept: LAB | Facility: HOSPITAL | Age: 65
End: 2020-12-07
Attending: NURSE PRACTITIONER
Payer: COMMERCIAL

## 2020-12-07 DIAGNOSIS — E89.0 POSTSURGICAL HYPOTHYROIDISM: ICD-10-CM

## 2020-12-07 DIAGNOSIS — Z13.1 DIABETES MELLITUS SCREENING: ICD-10-CM

## 2020-12-07 DIAGNOSIS — E78.2 MIXED HYPERLIPIDEMIA: ICD-10-CM

## 2020-12-07 DIAGNOSIS — Z13.0 SCREENING FOR DEFICIENCY ANEMIA: ICD-10-CM

## 2020-12-07 DIAGNOSIS — Z00.00 ENCOUNTER FOR BLOOD TEST FOR ROUTINE GENERAL PHYSICAL EXAMINATION: ICD-10-CM

## 2020-12-07 DIAGNOSIS — Z11.4 SCREENING FOR HIV WITHOUT PRESENCE OF RISK FACTORS: ICD-10-CM

## 2020-12-07 LAB
ALBUMIN SERPL BCP-MCNC: 4 G/DL (ref 3.5–5.2)
ALP SERPL-CCNC: 78 U/L (ref 55–135)
ALT SERPL W/O P-5'-P-CCNC: 22 U/L (ref 10–44)
ANION GAP SERPL CALC-SCNC: 14 MMOL/L (ref 8–16)
AST SERPL-CCNC: 24 U/L (ref 10–40)
BASOPHILS # BLD AUTO: 0.05 K/UL (ref 0–0.2)
BASOPHILS NFR BLD: 0.7 % (ref 0–1.9)
BILIRUB SERPL-MCNC: 0.7 MG/DL (ref 0.1–1)
BUN SERPL-MCNC: 10 MG/DL (ref 8–23)
CALCIUM SERPL-MCNC: 9.2 MG/DL (ref 8.7–10.5)
CHLORIDE SERPL-SCNC: 99 MMOL/L (ref 95–110)
CHOLEST SERPL-MCNC: 232 MG/DL (ref 120–199)
CHOLEST/HDLC SERPL: 4.1 {RATIO} (ref 2–5)
CO2 SERPL-SCNC: 30 MMOL/L (ref 23–29)
CREAT SERPL-MCNC: 0.8 MG/DL (ref 0.5–1.4)
DIFFERENTIAL METHOD: NORMAL
EOSINOPHIL # BLD AUTO: 0.4 K/UL (ref 0–0.5)
EOSINOPHIL NFR BLD: 5.6 % (ref 0–8)
ERYTHROCYTE [DISTWIDTH] IN BLOOD BY AUTOMATED COUNT: 13.2 % (ref 11.5–14.5)
EST. GFR  (AFRICAN AMERICAN): >60 ML/MIN/1.73 M^2
EST. GFR  (NON AFRICAN AMERICAN): >60 ML/MIN/1.73 M^2
GLUCOSE SERPL-MCNC: 117 MG/DL (ref 70–110)
HCT VFR BLD AUTO: 45 % (ref 37–48.5)
HDLC SERPL-MCNC: 57 MG/DL (ref 40–75)
HDLC SERPL: 24.6 % (ref 20–50)
HGB BLD-MCNC: 14.7 G/DL (ref 12–16)
IMM GRANULOCYTES # BLD AUTO: 0.02 K/UL (ref 0–0.04)
IMM GRANULOCYTES NFR BLD AUTO: 0.3 % (ref 0–0.5)
LDLC SERPL CALC-MCNC: 134.4 MG/DL (ref 63–159)
LYMPHOCYTES # BLD AUTO: 1.9 K/UL (ref 1–4.8)
LYMPHOCYTES NFR BLD: 28.3 % (ref 18–48)
MCH RBC QN AUTO: 29.1 PG (ref 27–31)
MCHC RBC AUTO-ENTMCNC: 32.7 G/DL (ref 32–36)
MCV RBC AUTO: 89 FL (ref 82–98)
MONOCYTES # BLD AUTO: 0.6 K/UL (ref 0.3–1)
MONOCYTES NFR BLD: 8.4 % (ref 4–15)
NEUTROPHILS # BLD AUTO: 3.8 K/UL (ref 1.8–7.7)
NEUTROPHILS NFR BLD: 56.7 % (ref 38–73)
NONHDLC SERPL-MCNC: 175 MG/DL
NRBC BLD-RTO: 0 /100 WBC
PLATELET # BLD AUTO: 315 K/UL (ref 150–350)
PMV BLD AUTO: 11.2 FL (ref 9.2–12.9)
POTASSIUM SERPL-SCNC: 3.5 MMOL/L (ref 3.5–5.1)
PROT SERPL-MCNC: 7 G/DL (ref 6–8.4)
RBC # BLD AUTO: 5.06 M/UL (ref 4–5.4)
SODIUM SERPL-SCNC: 143 MMOL/L (ref 136–145)
T4 FREE SERPL-MCNC: 0.98 NG/DL (ref 0.71–1.51)
TRIGL SERPL-MCNC: 203 MG/DL (ref 30–150)
TSH SERPL DL<=0.005 MIU/L-ACNC: 3.34 UIU/ML (ref 0.4–4)
WBC # BLD AUTO: 6.75 K/UL (ref 3.9–12.7)

## 2020-12-07 PROCEDURE — 84439 ASSAY OF FREE THYROXINE: CPT

## 2020-12-07 PROCEDURE — 86703 HIV-1/HIV-2 1 RESULT ANTBDY: CPT

## 2020-12-07 PROCEDURE — 84443 ASSAY THYROID STIM HORMONE: CPT

## 2020-12-07 PROCEDURE — 36415 COLL VENOUS BLD VENIPUNCTURE: CPT | Mod: PO

## 2020-12-07 PROCEDURE — 80061 LIPID PANEL: CPT

## 2020-12-07 PROCEDURE — 80053 COMPREHEN METABOLIC PANEL: CPT

## 2020-12-07 PROCEDURE — 85025 COMPLETE CBC W/AUTO DIFF WBC: CPT

## 2020-12-08 LAB — HIV 1+2 AB+HIV1 P24 AG SERPL QL IA: NEGATIVE

## 2020-12-11 ENCOUNTER — OFFICE VISIT (OUTPATIENT)
Dept: FAMILY MEDICINE | Facility: CLINIC | Age: 65
End: 2020-12-11
Payer: COMMERCIAL

## 2020-12-11 VITALS
HEIGHT: 68 IN | OXYGEN SATURATION: 96 % | TEMPERATURE: 98 F | HEART RATE: 83 BPM | DIASTOLIC BLOOD PRESSURE: 74 MMHG | BODY MASS INDEX: 29.94 KG/M2 | WEIGHT: 197.56 LBS | SYSTOLIC BLOOD PRESSURE: 110 MMHG

## 2020-12-11 DIAGNOSIS — E89.0 POSTSURGICAL HYPOTHYROIDISM: ICD-10-CM

## 2020-12-11 DIAGNOSIS — E66.9 CLASS 1 OBESITY WITH SERIOUS COMORBIDITY AND BODY MASS INDEX (BMI) OF 30.0 TO 30.9 IN ADULT, UNSPECIFIED OBESITY TYPE: ICD-10-CM

## 2020-12-11 DIAGNOSIS — M25.50 POLYARTHRALGIA: ICD-10-CM

## 2020-12-11 DIAGNOSIS — Z79.890 HORMONE REPLACEMENT THERAPY (HRT): ICD-10-CM

## 2020-12-11 DIAGNOSIS — I10 ESSENTIAL HYPERTENSION: ICD-10-CM

## 2020-12-11 DIAGNOSIS — F43.0 STRESS REACTION CAUSING MIXED DISTURBANCE OF EMOTION AND CONDUCT: ICD-10-CM

## 2020-12-11 DIAGNOSIS — M17.0 PRIMARY OSTEOARTHRITIS OF BOTH KNEES: ICD-10-CM

## 2020-12-11 DIAGNOSIS — F33.0 MILD RECURRENT MAJOR DEPRESSION: ICD-10-CM

## 2020-12-11 DIAGNOSIS — Z00.00 ANNUAL PHYSICAL EXAM: Primary | ICD-10-CM

## 2020-12-11 DIAGNOSIS — R73.01 IFG (IMPAIRED FASTING GLUCOSE): ICD-10-CM

## 2020-12-11 DIAGNOSIS — E78.2 MIXED HYPERLIPIDEMIA: ICD-10-CM

## 2020-12-11 DIAGNOSIS — Z86.19 HISTORY OF HEPATITIS C: ICD-10-CM

## 2020-12-11 PROBLEM — E66.811 CLASS 1 OBESITY WITH SERIOUS COMORBIDITY AND BODY MASS INDEX (BMI) OF 30.0 TO 30.9 IN ADULT: Status: ACTIVE | Noted: 2020-12-11

## 2020-12-11 PROCEDURE — 99999 PR PBB SHADOW E&M-EST. PATIENT-LVL IV: ICD-10-PCS | Mod: PBBFAC,,, | Performed by: NURSE PRACTITIONER

## 2020-12-11 PROCEDURE — 3074F PR MOST RECENT SYSTOLIC BLOOD PRESSURE < 130 MM HG: ICD-10-PCS | Mod: CPTII,S$GLB,, | Performed by: NURSE PRACTITIONER

## 2020-12-11 PROCEDURE — 3008F BODY MASS INDEX DOCD: CPT | Mod: CPTII,S$GLB,, | Performed by: NURSE PRACTITIONER

## 2020-12-11 PROCEDURE — 99999 PR PBB SHADOW E&M-EST. PATIENT-LVL IV: CPT | Mod: PBBFAC,,, | Performed by: NURSE PRACTITIONER

## 2020-12-11 PROCEDURE — 3008F PR BODY MASS INDEX (BMI) DOCUMENTED: ICD-10-PCS | Mod: CPTII,S$GLB,, | Performed by: NURSE PRACTITIONER

## 2020-12-11 PROCEDURE — 3078F DIAST BP <80 MM HG: CPT | Mod: CPTII,S$GLB,, | Performed by: NURSE PRACTITIONER

## 2020-12-11 PROCEDURE — 1126F AMNT PAIN NOTED NONE PRSNT: CPT | Mod: S$GLB,,, | Performed by: NURSE PRACTITIONER

## 2020-12-11 PROCEDURE — 3078F PR MOST RECENT DIASTOLIC BLOOD PRESSURE < 80 MM HG: ICD-10-PCS | Mod: CPTII,S$GLB,, | Performed by: NURSE PRACTITIONER

## 2020-12-11 PROCEDURE — 99396 PR PREVENTIVE VISIT,EST,40-64: ICD-10-PCS | Mod: S$GLB,,, | Performed by: NURSE PRACTITIONER

## 2020-12-11 PROCEDURE — 3074F SYST BP LT 130 MM HG: CPT | Mod: CPTII,S$GLB,, | Performed by: NURSE PRACTITIONER

## 2020-12-11 PROCEDURE — 99396 PREV VISIT EST AGE 40-64: CPT | Mod: S$GLB,,, | Performed by: NURSE PRACTITIONER

## 2020-12-11 PROCEDURE — 1126F PR PAIN SEVERITY QUANTIFIED, NO PAIN PRESENT: ICD-10-PCS | Mod: S$GLB,,, | Performed by: NURSE PRACTITIONER

## 2020-12-11 RX ORDER — TRAZODONE HYDROCHLORIDE 50 MG/1
TABLET ORAL
Qty: 90 TABLET | Refills: 1 | Status: SHIPPED | OUTPATIENT
Start: 2020-12-11 | End: 2021-01-16 | Stop reason: SDUPTHER

## 2020-12-11 RX ORDER — LEVOTHYROXINE SODIUM 50 UG/1
50 TABLET ORAL DAILY
Qty: 90 TABLET | Refills: 1 | Status: SHIPPED | OUTPATIENT
Start: 2020-12-11 | End: 2021-06-11 | Stop reason: SDUPTHER

## 2020-12-11 RX ORDER — AMLODIPINE BESYLATE 5 MG/1
5 TABLET ORAL DAILY
Qty: 90 TABLET | Refills: 1 | Status: SHIPPED | OUTPATIENT
Start: 2020-12-11 | End: 2021-06-11 | Stop reason: SDUPTHER

## 2020-12-11 RX ORDER — VALSARTAN AND HYDROCHLOROTHIAZIDE 160; 25 MG/1; MG/1
1 TABLET ORAL DAILY
Qty: 90 TABLET | Refills: 1 | Status: SHIPPED | OUTPATIENT
Start: 2020-12-11 | End: 2021-06-11 | Stop reason: SDUPTHER

## 2020-12-11 NOTE — PROGRESS NOTES
"Subjective:       Patient ID: Gudelia Cerrato is a 65 y.o. female.    Chief Complaint: Wellness (Pt here for wellness exam/ had labs done monday )    HPI    Patient is a 65-year-old white female with hypertension, hyperlipidemia that is on lifestyle modifications only, IFG, postsurgical hypothyroidism, history of hepatitis C, on hormone replacement therapy, chronic idiopathic recurrent syncope and collapse and osteoarthritis that is here today for annual physical exam with fasting lab results.     Patient has Hypertension that is controlled on Amlodipine 5 mg daily and Valsartan /25 mg daily.   /74   Pulse 83   Temp 97.8 °F (36.6 °C) (Oral)   Ht 5' 8" (1.727 m)   Wt 89.6 kg (197 lb 8.5 oz)   SpO2 96%   BMI 30.03 kg/m²      Patient has hyperlipidemia and currently on lifestyle modifications only.  Patient has been tried on STATIN therapy in the past and unable to tolerate. She states she will work on dietary modifications and if still not improved, will reconsider medication.     Patient has postsurgical hypothyroidism and takes brand Synthroid 50 mcg daily and controlled on this dose when levels last checked in December 2019 - monitored levels yearly.     Patient has a history of depression back in 2015 while getting the divorce and was on Cymbalta 60 mg daily.  Patient had reported that when Cymbalta changed to the generic it was no longer effective so she required the brand Cymbalta back in 2015 and was  able to get off the medicine around 2016.  Patient reported at October 2018 visit that she was again having increased stress/anxiety/and depressed mood due to family stressors daily with parents Health conditions.  And she also reported polyarthralgias that she found the Cymbalta helped with chronic musculoskeletal pain as well as mood in the past so I started patient back on Cymbalta 30 mg at October 2018 visit and titrated up to 60 mg daily.  Patient reports she was unable to afford the BRAND " Cymbalta as it would have been over $800  So has been taking generic Cymbalta 60 mg daily since October 2018 and tolerating well.  Depression is mostly  controlled -she reports she lost her father in June 2020 and mother is now on her deathbed - she is asking about medication for evening/sleep.     Patient has polyarthralgias and takes Celebrex 200 mg daily.  She is also followed by Orthopedic MD Dr. Juan Steel.     Patient is on hormone replacement therapy by her gyn MD.     Patient has Chronic Recurrent Idiopathic Syncopal Episodes.  She has had workups from Neurologist and Cardiologist and Electrophysiologists in past and all testing negative.  Reports family history of idiopathic syncope.    Patient has  - will work on lifestyle modifications and recheck in 6 weeks.    Wellness labs:  -  CBC WNL  -  CMP shows , kidney and liver function normal  -  Cholesterol discussed above.  -  Thyroid controlled on present medication.  - HIV screening    Health Maintenance:  -  Up to date.    Component      Latest Ref Rng & Units 12/7/2020 11/18/2019 7/15/2019 11/19/2018   WBC      3.90 - 12.70 K/uL 6.75 5.68 5.88 5.15   RBC      4.00 - 5.40 M/uL 5.06 5.18 5.02 4.76   Hemoglobin      12.0 - 16.0 g/dL 14.7 14.1 14.7 14.6   Hematocrit      37.0 - 48.5 % 45.0 44.5 44.8 43.4   MCV      82 - 98 fL 89 86 89 91   MCH      27.0 - 31.0 pg 29.1 27.2 29.3 30.7   MCHC      32.0 - 36.0 g/dL 32.7 31.7 (L) 32.8 33.6   RDW      11.5 - 14.5 % 13.2 14.0 12.8 12.3   Platelets      150 - 350 K/uL 315 313 359 (H) 293   MPV      9.2 - 12.9 fL 11.2 10.4 10.6 11.0   Immature Granulocytes      0.0 - 0.5 % 0.3 0.7 (H) 0.5 0.6 (H)   Gran # (ANC)      1.8 - 7.7 K/uL 3.8 3.0 3.5 2.5   Immature Grans (Abs)      0.00 - 0.04 K/uL 0.02 0.04 0.03 0.03   Lymph #      1.0 - 4.8 K/uL 1.9 1.8 1.5 1.8   Mono #      0.3 - 1.0 K/uL 0.6 0.5 0.6 0.5   Eos #      0.0 - 0.5 K/uL 0.4 0.2 0.3 0.3   Baso #      0.00 - 0.20 K/uL 0.05 0.06 0.03 0.03    nRBC      0 /100 WBC 0 0 0 0   Gran %      38.0 - 73.0 % 56.7 53.3 59.7 49.3   Lymph %      18.0 - 48.0 % 28.3 32.2 25.0 34.6   Mono %      4.0 - 15.0 % 8.4 8.8 9.9 9.7   Eosinophil %      0.0 - 8.0 % 5.6 3.9 4.4 5.2   Basophil %      0.0 - 1.9 % 0.7 1.1 0.5 0.6   Differential Method       Automated Automated Automated Automated   Sodium      136 - 145 mmol/L 143 141 140 140   Potassium      3.5 - 5.1 mmol/L 3.5 3.6 3.7 3.7   Chloride      95 - 110 mmol/L 99 103 101 102   CO2      23 - 29 mmol/L 30 (H) 31 (H) 31 (H) 33 (H)   Glucose      70 - 110 mg/dL 117 (H) 109 96 98   BUN      8 - 23 mg/dL 10 19 12 15   Creatinine      0.5 - 1.4 mg/dL 0.8 0.7 0.8 0.8   Calcium      8.7 - 10.5 mg/dL 9.2 8.8 8.9 8.8   PROTEIN TOTAL      6.0 - 8.4 g/dL 7.0 6.6 6.4 6.5   Albumin      3.5 - 5.2 g/dL 4.0 3.6 3.5 3.6   BILIRUBIN TOTAL      0.1 - 1.0 mg/dL 0.7 0.3 0.4 0.7   Alkaline Phosphatase      55 - 135 U/L 78 79 80 69   AST      10 - 40 U/L 24 17 19 22   ALT      10 - 44 U/L 22 16 15 14   Anion Gap      8 - 16 mmol/L 14 7 (L) 8 5 (L)   eGFR if African American      >60 mL/min/1.73 m:2 >60.0 >60.0 >60.0 >60.0   eGFR if non African American      >60 mL/min/1.73 m:2 >60.0 >60.0 >60.0 >60.0   Cholesterol      120 - 199 mg/dL 232 (H) 240 (H)  218 (H)   Triglycerides      30 - 150 mg/dL 203 (H) 112  154 (H)   HDL      40 - 75 mg/dL 57 62  64   LDL Cholesterol External      63.0 - 159.0 mg/dL 134.4 155.6  123.2   HDL/Cholesterol Ratio      20.0 - 50.0 % 24.6 25.8  29.4   Total Cholesterol/HDL Ratio      2.0 - 5.0 4.1 3.9  3.4   Non-HDL Cholesterol      mg/dL 175 178  154   TSH      0.400 - 4.000 uIU/mL 3.337 2.656 2.323 3.179   Free T4      0.71 - 1.51 ng/dL 0.98 0.98 0.90 0.97   HIV 1/2 Ag/Ab      Negative Negative        Current Outpatient Medications   Medication Sig Dispense Refill    amLODIPine (NORVASC) 5 MG tablet Take 1 tablet (5 mg total) by mouth once daily. 90 tablet 0    butalbital-acetaminophen-caffeine -40 mg  (FIORICET, ESGIC) -40 mg per tablet Take 1 tablet by mouth every 4 (four) hours as needed.  2    calcium crb,cit/D3/min34/nancy (CITRACAL + BONE DENSITY ORAL) Citracal + Bone Density      celecoxib (CELEBREX) 200 MG capsule Take 1 capsule (200 mg total) by mouth once daily. 30 capsule 5    DULoxetine (CYMBALTA) 60 MG capsule Take 1 capsule (60 mg total) by mouth once daily. 90 capsule 3    estradioL (ESTRACE) 1 MG tablet Take 1 tablet (1 mg total) by mouth once daily. 30 tablet 11    multivitamin/iron/folic acid (CENTRUM WOMEN ORAL) Centrum      SYNTHROID 50 mcg tablet Take 1 tablet (50 mcg total) by mouth once daily. 90 tablet 0    valsartan-hydrochlorothiazide (DIOVAN-HCT) 160-25 mg per tablet Take 1 tablet by mouth once daily. 90 tablet 0     No current facility-administered medications for this visit.        Past Medical History:   Diagnosis Date    Depression     GERD (gastroesophageal reflux disease)     Hepatitis C 2000    Treated with Combination Interferon and told she is Cured by [facility]    Hyperlipidemia     Hypertension     Postsurgical hypothyroidism     Reflux     Syncope and collapse 2013    known recurring syncope and collapse - has had a complete workup from a Neurological specialist, from a Cardiac Specialist with Electrophysiologist and all testing completely negative; Idiopathic Syncope does run in the family    Urge incontinence of urine        Past Surgical History:   Procedure Laterality Date    breast reduction      BREAST SURGERY  8/1996    Reduction    COLONOSCOPY  11/6/2013    + polyp - Repeat in 5 years    COLONOSCOPY  02/13/2017    + polyp - repeat in 5 years - Dr. Gutiérrez    EYE SURGERY  4/4/19    Right eye-Laser to lower pressure    HYSTERECTOMY      Partial - still has ovaries    JOINT REPLACEMENT  8/13/2019    Left Hip    ROTATOR CUFF REPAIR      SPINE SURGERY  8/22/13    bone spur to cervical spine removed    THYROIDECTOMY Right      Partial - right lobe removed    TONSILLECTOMY      TOTAL HIP ARTHROPLASTY         Family History   Problem Relation Age of Onset    Colon cancer Paternal Grandmother     Cancer Paternal Grandmother     Colon cancer Maternal Grandmother     Cancer Maternal Grandmother         colon    Hyperlipidemia Mother     Stroke Mother 78        stents placed for carotid blockage; multiple TIAs    Vision loss Mother     Hypertension Father     Stroke Father 84    Hyperlipidemia Father     Vision loss Father     Parkinsonism Maternal Grandfather         passed in his 70's    Heart disease Paternal Grandfather     Asthma Sister         in her 50's    Hypertension Brother     Hypertension Sister     Hypertension Sister     Mental illness Sister         Undiagnosed    Diabetes Maternal Aunt     Heart disease Maternal Aunt     Diabetes Maternal Aunt     Breast cancer Neg Hx     Ovarian cancer Neg Hx        Social History     Socioeconomic History    Marital status:      Spouse name: Not on file    Number of children: Not on file    Years of education: Not on file    Highest education level: Not on file   Occupational History    Not on file   Social Needs    Financial resource strain: Hard    Food insecurity     Worry: Never true     Inability: Never true    Transportation needs     Medical: No     Non-medical: No   Tobacco Use    Smoking status: Never Smoker    Smokeless tobacco: Never Used   Substance and Sexual Activity    Alcohol use: Yes     Alcohol/week: 3.0 standard drinks     Types: 1 Glasses of wine, 1 Cans of beer, 1 Shots of liquor per week     Frequency: 2-4 times a month     Drinks per session: 1 or 2     Binge frequency: Never     Comment: Social drinker, amount depends on social engagements    Drug use: No    Sexual activity: Yes     Partners: Male     Birth control/protection: Post-menopausal   Lifestyle    Physical activity     Days per week: 2 days     Minutes per  "session: 20 min    Stress: Only a little   Relationships    Social connections     Talks on phone: More than three times a week     Gets together: More than three times a week     Attends L[employer] service: Not on file     Active member of club or organization: Yes     Attends meetings of clubs or organizations: More than 4 times per year     Relationship status:    Other Topics Concern    Not on file   Social History Narrative    Not on file       Review of Systems   Constitutional: Negative for activity change and unexpected weight change.   HENT: Negative for hearing loss, rhinorrhea and trouble swallowing.    Eyes: Negative for discharge.   Respiratory: Negative for chest tightness and wheezing.    Cardiovascular: Negative for chest pain and palpitations.   Gastrointestinal: Negative for blood in stool, constipation, diarrhea and vomiting.   Endocrine: Negative for polydipsia and polyuria.   Genitourinary: Negative for difficulty urinating, dysuria, hematuria and menstrual problem.   Musculoskeletal: Positive for arthralgias and joint swelling. Negative for neck pain.   Neurological: Negative for weakness and headaches.   Psychiatric/Behavioral: Positive for dysphoric mood and sleep disturbance. Negative for confusion.         Objective:     Vitals:    12/11/20 1310   BP: 110/74   Pulse: 83   Temp: 97.8 °F (36.6 °C)   TempSrc: Oral   SpO2: 96%   Weight: 89.6 kg (197 lb 8.5 oz)   Height: 5' 8" (1.727 m)          Physical Exam  Constitutional:       General: She is not in acute distress.     Appearance: Normal appearance. She is well-developed. She is obese. She is not ill-appearing, toxic-appearing or diaphoretic.      Comments: + obesity with Body mass index is 30.03 kg/m².     HENT:      Head: Normocephalic and atraumatic.      Right Ear: Tympanic membrane and external ear normal.      Left Ear: Tympanic membrane and external ear normal.      Nose: Nose normal.      Mouth/Throat:      Pharynx: No " oropharyngeal exudate.   Eyes:      General: No scleral icterus.        Right eye: No discharge.         Left eye: No discharge.      Extraocular Movements: Extraocular movements intact.      Pupils: Pupils are equal, round, and reactive to light.   Neck:      Musculoskeletal: Normal range of motion and neck supple.      Thyroid: No thyromegaly.      Vascular: No JVD.      Trachea: No tracheal deviation.   Cardiovascular:      Rate and Rhythm: Normal rate and regular rhythm.      Heart sounds: Normal heart sounds. No murmur.   Pulmonary:      Effort: Pulmonary effort is normal. No respiratory distress.      Breath sounds: Normal breath sounds. No stridor. No wheezing, rhonchi or rales.   Abdominal:      General: There is no distension.      Palpations: Abdomen is soft. There is no mass.      Tenderness: There is no abdominal tenderness. There is no guarding.   Musculoskeletal: Normal range of motion.         General: No swelling or deformity.      Right lower leg: No edema.      Left lower leg: No edema.   Lymphadenopathy:      Cervical: No cervical adenopathy.   Skin:     General: Skin is warm and dry.      Findings: No rash.   Neurological:      Mental Status: She is alert and oriented to person, place, and time.      Cranial Nerves: No cranial nerve deficit.      Coordination: Coordination normal.   Psychiatric:         Mood and Affect: Mood normal.         Behavior: Behavior normal.         Thought Content: Thought content normal.         Judgment: Judgment normal.           Assessment:         ICD-10-CM ICD-9-CM   1. Annual physical exam  Z00.00 V70.0   2. Essential hypertension  I10 401.9   3. Mixed hyperlipidemia  E78.2 272.2   4. Mild recurrent major depression  F33.0 296.31   5. 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 Primary osteoarthritis of both knees  M17.0 715.16   7. Polyarthralgia  M25.50 719.49   8. Hormone replacement therapy (HRT)  Z79.890 V07.4   9.  Postsurgical hypothyroidism  E89.0 244.0   10. IFG (impaired fasting glucose)  R73.01 790.21   11. History of hepatitis C  Z86.19 V12.09   12. Class 1 obesity with serious comorbidity and body mass index (BMI) of 30.0 to 30.9 in adult, unspecified obesity type  E66.9 278.00    Z68.30 V85.30       Plan:       Annual physical exam  Health Maintenance Summary    Mammogram Next Due 10/23/2021     Done 10/23/2020 [facility]    Done 7/23/2019 [facility]    Done 3/14/2018 [facility]    Done 12/30/2016     Done 11/2/2015    Colorectal Cancer Screening Next Due 2/13/2022    TETANUS VACCINE Next Due 3/8/2022     Done 3/8/2012 Imm Admin: Tdap   Lipid Panel Next Due 12/7/2025     Done 12/7/2020 LIPID PANEL     Done 11/18/2019 LIPID PANEL     Done 11/19/2018 LIPID PANEL     Done 9/28/2017 LIPID PANEL     Done 7/21/2016 LIPID PANEL    Patient has more history with this topic...   Pneumococcal Vaccine (Medium Risk) This plan is no longer active.     Done 5/22/2019 Imm Admin: Pneumococcal Polysaccharide - 23 Valent   Shingles Vaccine This plan is no longer active.     Done 10/10/2019 Imm Admin: Zoster Recombinant    Done 5/22/2019 Imm Admin: Zoster Recombinant    Done 10/4/2017 Imm Admin: Zoster   Influenza Vaccine This plan is no longer active.     Done 10/3/2020 Imm Admin: Influenza    Done 12/4/2019 Imm Admin: Influenza - Quadrivalent - PF *Preferred* (6 months and older)    Done 10/10/2018 Imm Admin: Influenza - Quadrivalent - PF *Preferred* (6 months and older)    Done 10/4/2017 Imm Admin: Influenza - Quadrivalent - PF *Preferred* (6 months and older)    Done 11/28/2016 Imm Admin: Influenza - Quadrivalent    Patient has more history with this topic...   HIV Screening This plan is no longer active.     Done 12/7/2020 HIV 1 / 2 ANTIBODY   Hepatitis C Screening This plan is no longer active.     Not Clinically Appropriate 7/12/2016 Patient has a history of Hepatitis C       Essential hypertension  -  controlled on  present medications.  Follow-up in 6 months.  -     valsartan-hydrochlorothiazide (DIOVAN-HCT) 160-25 mg per tablet; Take 1 tablet by mouth once daily.  Dispense: 90 tablet; Refill: 1  -     amLODIPine (NORVASC) 5 MG tablet; Take 1 tablet (5 mg total) by mouth once daily.  Dispense: 90 tablet; Refill: 1    Mixed hyperlipidemia  -  patient declines any medicine for cholesterol control.  States she will work on lifestyle modifications in diet.  Will recheck in 6 months, and if still uncontrolled, she may consider starting medicine.  -     Lipid Panel; Future; Expected date: 12/11/2020    Mild recurrent major depression  -  continue the Cymbalta 60 mg daily.  Mostly controlled on current medicine.  Her mom is currently on her death bed expected to die any day now also some situational depression and problems with sleep.  Will start trazodone and titrate as needed for sleep.  As long as controlled, follow up in 6 months..  If the addition of Trazodone is ineffective - need to return to office for further discussion.    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  May titrate by 25 every 2 nights up to 200 mg nightly for effectiveness.  Dispense: 90 tablet; Refill: 1    Primary osteoarthritis of both knees  -  followed by orthopedic MD in takes Celebrex 200 mg daily    Polyarthralgia    Hormone replacement therapy (HRT)  -  followed by gyn MD    Postsurgical hypothyroidism  -  controlled on present medication and dose.  Recheck in 6 months.  -     SYNTHROID 50 mcg tablet; Take 1 tablet (50 mcg total) by mouth once daily.  Dispense: 90 tablet; Refill: 1    IFG (impaired fasting glucose)  -  decrease sugars and carbohydrates in diet and will recheck in 6 months  -     Comprehensive Metabolic Panel; Future; Expected date: 12/11/2020  -     Hemoglobin A1C; Future; Expected date: 12/11/2020    History of hepatitis C  -   treated for cure in past    Class 1 obesity with serious comorbidity and body mass index (BMI) of 30.0 to 30.9 in adult, unspecified obesity type  -  work on lifestyle modifications      Follow up in about 6 months (around 6/11/2021) for fasting labs and follow up.     Patient's Medications   New Prescriptions    TRAZODONE (DESYREL) 50 MG TABLET    Take 50 mg at bedtime as needed for insomnia.  May titrate by 25 every 2 nights up to 200 mg nightly for effectiveness.   Previous Medications    BUTALBITAL-ACETAMINOPHEN-CAFFEINE -40 MG (FIORICET, ESGIC) -40 MG PER TABLET    Take 1 tablet by mouth every 4 (four) hours as needed.    CALCIUM CRB,CIT/D3/MIN34/NELLY (CITRACAL + BONE DENSITY ORAL)    Citracal + Bone Density    CELECOXIB (CELEBREX) 200 MG CAPSULE    Take 1 capsule (200 mg total) by mouth once daily.    DULOXETINE (CYMBALTA) 60 MG CAPSULE    Take 1 capsule (60 mg total) by mouth once daily.    ESTRADIOL (ESTRACE) 1 MG TABLET    Take 1 tablet (1 mg total) by mouth once daily.    MULTIVITAMIN/IRON/FOLIC ACID (CENTRUM WOMEN ORAL)    Centrum   Modified Medications    Modified Medication Previous Medication    AMLODIPINE (NORVASC) 5 MG TABLET amLODIPine (NORVASC) 5 MG tablet       Take 1 tablet (5 mg total) by mouth once daily.    Take 1 tablet (5 mg total) by mouth once daily.    SYNTHROID 50 MCG TABLET SYNTHROID 50 mcg tablet       Take 1 tablet (50 mcg total) by mouth once daily.    Take 1 tablet (50 mcg total) by mouth once daily.    VALSARTAN-HYDROCHLOROTHIAZIDE (DIOVAN-HCT) 160-25 MG PER TABLET valsartan-hydrochlorothiazide (DIOVAN-HCT) 160-25 mg per tablet       Take 1 tablet by mouth once daily.    Take 1 tablet by mouth once daily.   Discontinued Medications    MELOXICAM (MOBIC) 15 MG TABLET    Take 1 tablet (15 mg total) by mouth daily as needed.

## 2021-01-16 DIAGNOSIS — F43.0 STRESS REACTION CAUSING MIXED DISTURBANCE OF EMOTION AND CONDUCT: ICD-10-CM

## 2021-01-19 RX ORDER — TRAZODONE HYDROCHLORIDE 50 MG/1
TABLET ORAL
Qty: 90 TABLET | Refills: 1 | Status: SHIPPED | OUTPATIENT
Start: 2021-01-19 | End: 2021-02-25 | Stop reason: SDUPTHER

## 2021-02-25 DIAGNOSIS — F43.0 STRESS REACTION CAUSING MIXED DISTURBANCE OF EMOTION AND CONDUCT: ICD-10-CM

## 2021-02-25 RX ORDER — TRAZODONE HYDROCHLORIDE 50 MG/1
TABLET ORAL
Qty: 90 TABLET | Refills: 1 | Status: SHIPPED | OUTPATIENT
Start: 2021-02-25 | End: 2021-11-25 | Stop reason: SDUPTHER

## 2021-06-07 ENCOUNTER — LAB VISIT (OUTPATIENT)
Dept: LAB | Facility: HOSPITAL | Age: 66
End: 2021-06-07
Attending: NURSE PRACTITIONER
Payer: COMMERCIAL

## 2021-06-07 DIAGNOSIS — E89.0 POSTSURGICAL HYPOTHYROIDISM: ICD-10-CM

## 2021-06-07 DIAGNOSIS — R73.01 IFG (IMPAIRED FASTING GLUCOSE): ICD-10-CM

## 2021-06-07 DIAGNOSIS — E78.2 MIXED HYPERLIPIDEMIA: ICD-10-CM

## 2021-06-07 LAB
ALBUMIN SERPL BCP-MCNC: 3.9 G/DL (ref 3.5–5.2)
ALP SERPL-CCNC: 75 U/L (ref 55–135)
ALT SERPL W/O P-5'-P-CCNC: 24 U/L (ref 10–44)
ANION GAP SERPL CALC-SCNC: 10 MMOL/L (ref 8–16)
AST SERPL-CCNC: 24 U/L (ref 10–40)
BILIRUB SERPL-MCNC: 0.9 MG/DL (ref 0.1–1)
BUN SERPL-MCNC: 12 MG/DL (ref 8–23)
CALCIUM SERPL-MCNC: 9.5 MG/DL (ref 8.7–10.5)
CHLORIDE SERPL-SCNC: 103 MMOL/L (ref 95–110)
CHOLEST SERPL-MCNC: 246 MG/DL (ref 120–199)
CHOLEST/HDLC SERPL: 3.5 {RATIO} (ref 2–5)
CO2 SERPL-SCNC: 28 MMOL/L (ref 23–29)
CREAT SERPL-MCNC: 0.9 MG/DL (ref 0.5–1.4)
EST. GFR  (AFRICAN AMERICAN): >60 ML/MIN/1.73 M^2
EST. GFR  (NON AFRICAN AMERICAN): >60 ML/MIN/1.73 M^2
ESTIMATED AVG GLUCOSE: 103 MG/DL (ref 68–131)
GLUCOSE SERPL-MCNC: 107 MG/DL (ref 70–110)
HBA1C MFR BLD: 5.2 % (ref 4–5.6)
HDLC SERPL-MCNC: 70 MG/DL (ref 40–75)
HDLC SERPL: 28.5 % (ref 20–50)
LDLC SERPL CALC-MCNC: 144.4 MG/DL (ref 63–159)
NONHDLC SERPL-MCNC: 176 MG/DL
POTASSIUM SERPL-SCNC: 3.8 MMOL/L (ref 3.5–5.1)
PROT SERPL-MCNC: 7.1 G/DL (ref 6–8.4)
SODIUM SERPL-SCNC: 141 MMOL/L (ref 136–145)
T4 FREE SERPL-MCNC: 1.13 NG/DL (ref 0.71–1.51)
TRIGL SERPL-MCNC: 158 MG/DL (ref 30–150)
TSH SERPL DL<=0.005 MIU/L-ACNC: 1.79 UIU/ML (ref 0.4–4)

## 2021-06-07 PROCEDURE — 36415 COLL VENOUS BLD VENIPUNCTURE: CPT | Mod: PO | Performed by: NURSE PRACTITIONER

## 2021-06-07 PROCEDURE — 83036 HEMOGLOBIN GLYCOSYLATED A1C: CPT | Performed by: NURSE PRACTITIONER

## 2021-06-07 PROCEDURE — 80053 COMPREHEN METABOLIC PANEL: CPT | Performed by: NURSE PRACTITIONER

## 2021-06-07 PROCEDURE — 84439 ASSAY OF FREE THYROXINE: CPT | Performed by: NURSE PRACTITIONER

## 2021-06-07 PROCEDURE — 84443 ASSAY THYROID STIM HORMONE: CPT | Performed by: NURSE PRACTITIONER

## 2021-06-07 PROCEDURE — 80061 LIPID PANEL: CPT | Performed by: NURSE PRACTITIONER

## 2021-06-11 ENCOUNTER — OFFICE VISIT (OUTPATIENT)
Dept: FAMILY MEDICINE | Facility: CLINIC | Age: 66
End: 2021-06-11
Payer: COMMERCIAL

## 2021-06-11 VITALS
SYSTOLIC BLOOD PRESSURE: 130 MMHG | DIASTOLIC BLOOD PRESSURE: 78 MMHG | WEIGHT: 184.88 LBS | HEART RATE: 83 BPM | BODY MASS INDEX: 28.02 KG/M2 | HEIGHT: 68 IN | TEMPERATURE: 98 F | RESPIRATION RATE: 16 BRPM | OXYGEN SATURATION: 97 %

## 2021-06-11 DIAGNOSIS — E89.0 POSTSURGICAL HYPOTHYROIDISM: ICD-10-CM

## 2021-06-11 DIAGNOSIS — Z13.0 SCREENING FOR DEFICIENCY ANEMIA: ICD-10-CM

## 2021-06-11 DIAGNOSIS — Z00.00 ENCOUNTER FOR BLOOD TEST FOR ROUTINE GENERAL PHYSICAL EXAMINATION: ICD-10-CM

## 2021-06-11 DIAGNOSIS — I10 ESSENTIAL HYPERTENSION: ICD-10-CM

## 2021-06-11 DIAGNOSIS — E78.2 MIXED HYPERLIPIDEMIA: Primary | ICD-10-CM

## 2021-06-11 DIAGNOSIS — Z13.1 DIABETES MELLITUS SCREENING: ICD-10-CM

## 2021-06-11 PROCEDURE — 3008F PR BODY MASS INDEX (BMI) DOCUMENTED: ICD-10-PCS | Mod: CPTII,S$GLB,, | Performed by: NURSE PRACTITIONER

## 2021-06-11 PROCEDURE — 3078F DIAST BP <80 MM HG: CPT | Mod: CPTII,S$GLB,, | Performed by: NURSE PRACTITIONER

## 2021-06-11 PROCEDURE — 99999 PR PBB SHADOW E&M-EST. PATIENT-LVL IV: ICD-10-PCS | Mod: PBBFAC,,, | Performed by: NURSE PRACTITIONER

## 2021-06-11 PROCEDURE — 1126F PR PAIN SEVERITY QUANTIFIED, NO PAIN PRESENT: ICD-10-PCS | Mod: S$GLB,,, | Performed by: NURSE PRACTITIONER

## 2021-06-11 PROCEDURE — 3008F BODY MASS INDEX DOCD: CPT | Mod: CPTII,S$GLB,, | Performed by: NURSE PRACTITIONER

## 2021-06-11 PROCEDURE — 3075F PR MOST RECENT SYSTOLIC BLOOD PRESS GE 130-139MM HG: ICD-10-PCS | Mod: CPTII,S$GLB,, | Performed by: NURSE PRACTITIONER

## 2021-06-11 PROCEDURE — 99214 PR OFFICE/OUTPT VISIT, EST, LEVL IV, 30-39 MIN: ICD-10-PCS | Mod: S$GLB,,, | Performed by: NURSE PRACTITIONER

## 2021-06-11 PROCEDURE — 99999 PR PBB SHADOW E&M-EST. PATIENT-LVL IV: CPT | Mod: PBBFAC,,, | Performed by: NURSE PRACTITIONER

## 2021-06-11 PROCEDURE — 99214 OFFICE O/P EST MOD 30 MIN: CPT | Mod: S$GLB,,, | Performed by: NURSE PRACTITIONER

## 2021-06-11 PROCEDURE — 3078F PR MOST RECENT DIASTOLIC BLOOD PRESSURE < 80 MM HG: ICD-10-PCS | Mod: CPTII,S$GLB,, | Performed by: NURSE PRACTITIONER

## 2021-06-11 PROCEDURE — 1126F AMNT PAIN NOTED NONE PRSNT: CPT | Mod: S$GLB,,, | Performed by: NURSE PRACTITIONER

## 2021-06-11 PROCEDURE — 3075F SYST BP GE 130 - 139MM HG: CPT | Mod: CPTII,S$GLB,, | Performed by: NURSE PRACTITIONER

## 2021-06-11 RX ORDER — ROSUVASTATIN CALCIUM 5 MG/1
5 TABLET, COATED ORAL DAILY
Qty: 90 TABLET | Refills: 1 | Status: SHIPPED | OUTPATIENT
Start: 2021-06-11 | End: 2021-11-28 | Stop reason: SDUPTHER

## 2021-06-11 RX ORDER — AMLODIPINE BESYLATE 5 MG/1
5 TABLET ORAL DAILY
Qty: 90 TABLET | Refills: 1 | Status: SHIPPED | OUTPATIENT
Start: 2021-06-11 | End: 2021-12-07 | Stop reason: SDUPTHER

## 2021-06-11 RX ORDER — LEVOTHYROXINE SODIUM 50 UG/1
50 TABLET ORAL DAILY
Qty: 90 TABLET | Refills: 1 | Status: SHIPPED | OUTPATIENT
Start: 2021-06-11 | End: 2021-12-07 | Stop reason: SDUPTHER

## 2021-06-11 RX ORDER — VALSARTAN AND HYDROCHLOROTHIAZIDE 160; 25 MG/1; MG/1
1 TABLET ORAL DAILY
Qty: 90 TABLET | Refills: 1 | Status: SHIPPED | OUTPATIENT
Start: 2021-06-11 | End: 2021-12-07 | Stop reason: SDUPTHER

## 2021-11-25 DIAGNOSIS — F43.0 STRESS REACTION CAUSING MIXED DISTURBANCE OF EMOTION AND CONDUCT: ICD-10-CM

## 2021-11-26 RX ORDER — TRAZODONE HYDROCHLORIDE 50 MG/1
TABLET ORAL
Qty: 90 TABLET | Refills: 1 | Status: SHIPPED | OUTPATIENT
Start: 2021-11-26 | End: 2022-04-07 | Stop reason: SDUPTHER

## 2021-11-28 DIAGNOSIS — E78.2 MIXED HYPERLIPIDEMIA: ICD-10-CM

## 2021-11-29 RX ORDER — ROSUVASTATIN CALCIUM 5 MG/1
5 TABLET, COATED ORAL DAILY
Qty: 90 TABLET | Refills: 1 | Status: SHIPPED | OUTPATIENT
Start: 2021-11-29 | End: 2021-12-16 | Stop reason: HOSPADM

## 2021-12-04 DIAGNOSIS — F43.0 STRESS REACTION CAUSING MIXED DISTURBANCE OF EMOTION AND CONDUCT: ICD-10-CM

## 2021-12-06 RX ORDER — DULOXETIN HYDROCHLORIDE 60 MG/1
60 CAPSULE, DELAYED RELEASE ORAL DAILY
Qty: 90 CAPSULE | Refills: 0 | Status: SHIPPED | OUTPATIENT
Start: 2021-12-06 | End: 2021-12-16 | Stop reason: DRUGHIGH

## 2021-12-07 DIAGNOSIS — E89.0 POSTSURGICAL HYPOTHYROIDISM: ICD-10-CM

## 2021-12-07 DIAGNOSIS — I10 ESSENTIAL HYPERTENSION: ICD-10-CM

## 2021-12-07 RX ORDER — LEVOTHYROXINE SODIUM 50 UG/1
50 TABLET ORAL DAILY
Qty: 90 TABLET | Refills: 0 | Status: SHIPPED | OUTPATIENT
Start: 2021-12-07 | End: 2021-12-16 | Stop reason: SDUPTHER

## 2021-12-07 RX ORDER — VALSARTAN AND HYDROCHLOROTHIAZIDE 160; 25 MG/1; MG/1
1 TABLET ORAL DAILY
Qty: 90 TABLET | Refills: 0 | Status: SHIPPED | OUTPATIENT
Start: 2021-12-07 | End: 2021-12-16 | Stop reason: ALTCHOICE

## 2021-12-07 RX ORDER — AMLODIPINE BESYLATE 5 MG/1
5 TABLET ORAL DAILY
Qty: 90 TABLET | Refills: 0 | Status: SHIPPED | OUTPATIENT
Start: 2021-12-07 | End: 2021-12-16 | Stop reason: ALTCHOICE

## 2021-12-13 ENCOUNTER — LAB VISIT (OUTPATIENT)
Dept: LAB | Facility: HOSPITAL | Age: 66
End: 2021-12-13
Attending: NURSE PRACTITIONER
Payer: COMMERCIAL

## 2021-12-13 DIAGNOSIS — Z00.00 ENCOUNTER FOR BLOOD TEST FOR ROUTINE GENERAL PHYSICAL EXAMINATION: ICD-10-CM

## 2021-12-13 DIAGNOSIS — Z13.1 DIABETES MELLITUS SCREENING: ICD-10-CM

## 2021-12-13 DIAGNOSIS — E78.2 MIXED HYPERLIPIDEMIA: ICD-10-CM

## 2021-12-13 DIAGNOSIS — Z13.0 SCREENING FOR DEFICIENCY ANEMIA: ICD-10-CM

## 2021-12-13 DIAGNOSIS — E89.0 POSTSURGICAL HYPOTHYROIDISM: ICD-10-CM

## 2021-12-13 LAB
ALBUMIN SERPL BCP-MCNC: 3.8 G/DL (ref 3.5–5.2)
ALP SERPL-CCNC: 66 U/L (ref 55–135)
ALT SERPL W/O P-5'-P-CCNC: 21 U/L (ref 10–44)
ANION GAP SERPL CALC-SCNC: 12 MMOL/L (ref 8–16)
AST SERPL-CCNC: 19 U/L (ref 10–40)
BASOPHILS # BLD AUTO: 0.04 K/UL (ref 0–0.2)
BASOPHILS NFR BLD: 0.9 % (ref 0–1.9)
BILIRUB SERPL-MCNC: 0.6 MG/DL (ref 0.1–1)
BUN SERPL-MCNC: 11 MG/DL (ref 8–23)
CALCIUM SERPL-MCNC: 9.1 MG/DL (ref 8.7–10.5)
CHLORIDE SERPL-SCNC: 101 MMOL/L (ref 95–110)
CHOLEST SERPL-MCNC: 249 MG/DL (ref 120–199)
CHOLEST/HDLC SERPL: 4.5 {RATIO} (ref 2–5)
CO2 SERPL-SCNC: 28 MMOL/L (ref 23–29)
CREAT SERPL-MCNC: 0.8 MG/DL (ref 0.5–1.4)
DIFFERENTIAL METHOD: NORMAL
EOSINOPHIL # BLD AUTO: 0.2 K/UL (ref 0–0.5)
EOSINOPHIL NFR BLD: 5 % (ref 0–8)
ERYTHROCYTE [DISTWIDTH] IN BLOOD BY AUTOMATED COUNT: 12.9 % (ref 11.5–14.5)
EST. GFR  (AFRICAN AMERICAN): >60 ML/MIN/1.73 M^2
EST. GFR  (NON AFRICAN AMERICAN): >60 ML/MIN/1.73 M^2
GLUCOSE SERPL-MCNC: 100 MG/DL (ref 70–110)
HCT VFR BLD AUTO: 44 % (ref 37–48.5)
HDLC SERPL-MCNC: 55 MG/DL (ref 40–75)
HDLC SERPL: 22.1 % (ref 20–50)
HGB BLD-MCNC: 15.3 G/DL (ref 12–16)
IMM GRANULOCYTES # BLD AUTO: 0.02 K/UL (ref 0–0.04)
IMM GRANULOCYTES NFR BLD AUTO: 0.4 % (ref 0–0.5)
LDLC SERPL CALC-MCNC: 154 MG/DL (ref 63–159)
LYMPHOCYTES # BLD AUTO: 1.5 K/UL (ref 1–4.8)
LYMPHOCYTES NFR BLD: 32.1 % (ref 18–48)
MCH RBC QN AUTO: 30.8 PG (ref 27–31)
MCHC RBC AUTO-ENTMCNC: 34.8 G/DL (ref 32–36)
MCV RBC AUTO: 89 FL (ref 82–98)
MONOCYTES # BLD AUTO: 0.4 K/UL (ref 0.3–1)
MONOCYTES NFR BLD: 9 % (ref 4–15)
NEUTROPHILS # BLD AUTO: 2.4 K/UL (ref 1.8–7.7)
NEUTROPHILS NFR BLD: 52.6 % (ref 38–73)
NONHDLC SERPL-MCNC: 194 MG/DL
NRBC BLD-RTO: 0 /100 WBC
PLATELET # BLD AUTO: 288 K/UL (ref 150–450)
PMV BLD AUTO: 10.7 FL (ref 9.2–12.9)
POTASSIUM SERPL-SCNC: 3.3 MMOL/L (ref 3.5–5.1)
PROT SERPL-MCNC: 6.5 G/DL (ref 6–8.4)
RBC # BLD AUTO: 4.96 M/UL (ref 4–5.4)
SODIUM SERPL-SCNC: 141 MMOL/L (ref 136–145)
T4 FREE SERPL-MCNC: 1 NG/DL (ref 0.71–1.51)
TRIGL SERPL-MCNC: 200 MG/DL (ref 30–150)
TSH SERPL DL<=0.005 MIU/L-ACNC: 1.59 UIU/ML (ref 0.4–4)
WBC # BLD AUTO: 4.58 K/UL (ref 3.9–12.7)

## 2021-12-13 PROCEDURE — 84443 ASSAY THYROID STIM HORMONE: CPT | Performed by: NURSE PRACTITIONER

## 2021-12-13 PROCEDURE — 84439 ASSAY OF FREE THYROXINE: CPT | Performed by: NURSE PRACTITIONER

## 2021-12-13 PROCEDURE — 36415 COLL VENOUS BLD VENIPUNCTURE: CPT | Mod: PO | Performed by: NURSE PRACTITIONER

## 2021-12-13 PROCEDURE — 80061 LIPID PANEL: CPT | Performed by: NURSE PRACTITIONER

## 2021-12-13 PROCEDURE — 85025 COMPLETE CBC W/AUTO DIFF WBC: CPT | Performed by: NURSE PRACTITIONER

## 2021-12-13 PROCEDURE — 80053 COMPREHEN METABOLIC PANEL: CPT | Performed by: NURSE PRACTITIONER

## 2021-12-16 ENCOUNTER — OFFICE VISIT (OUTPATIENT)
Dept: FAMILY MEDICINE | Facility: CLINIC | Age: 66
End: 2021-12-16
Payer: COMMERCIAL

## 2021-12-16 VITALS
WEIGHT: 190.38 LBS | HEART RATE: 80 BPM | HEIGHT: 68 IN | OXYGEN SATURATION: 98 % | TEMPERATURE: 98 F | SYSTOLIC BLOOD PRESSURE: 130 MMHG | DIASTOLIC BLOOD PRESSURE: 88 MMHG | BODY MASS INDEX: 28.85 KG/M2 | RESPIRATION RATE: 16 BRPM

## 2021-12-16 DIAGNOSIS — N39.42 URINARY INCONTINENCE WITHOUT SENSORY AWARENESS: ICD-10-CM

## 2021-12-16 DIAGNOSIS — Z12.31 ENCOUNTER FOR SCREENING MAMMOGRAM FOR MALIGNANT NEOPLASM OF BREAST: ICD-10-CM

## 2021-12-16 DIAGNOSIS — E89.0 POSTSURGICAL HYPOTHYROIDISM: ICD-10-CM

## 2021-12-16 DIAGNOSIS — E78.2 MIXED HYPERLIPIDEMIA: ICD-10-CM

## 2021-12-16 DIAGNOSIS — Z00.00 ANNUAL PHYSICAL EXAM: Primary | ICD-10-CM

## 2021-12-16 DIAGNOSIS — F33.42 RECURRENT MAJOR DEPRESSIVE DISORDER, IN FULL REMISSION: ICD-10-CM

## 2021-12-16 DIAGNOSIS — F43.0 STRESS REACTION CAUSING MIXED DISTURBANCE OF EMOTION AND CONDUCT: ICD-10-CM

## 2021-12-16 DIAGNOSIS — I10 ESSENTIAL HYPERTENSION: ICD-10-CM

## 2021-12-16 DIAGNOSIS — Z23 FLU VACCINE NEED: ICD-10-CM

## 2021-12-16 PROCEDURE — 4010F PR ACE/ARB THEARPY RXD/TAKEN: ICD-10-PCS | Mod: CPTII,S$GLB,, | Performed by: NURSE PRACTITIONER

## 2021-12-16 PROCEDURE — 90471 FLU VACCINE (QUAD) GREATER THAN OR EQUAL TO 3YO PRESERVATIVE FREE IM: ICD-10-PCS | Mod: S$GLB,,, | Performed by: NURSE PRACTITIONER

## 2021-12-16 PROCEDURE — 99396 PREV VISIT EST AGE 40-64: CPT | Mod: 25,S$GLB,, | Performed by: NURSE PRACTITIONER

## 2021-12-16 PROCEDURE — 4010F ACE/ARB THERAPY RXD/TAKEN: CPT | Mod: CPTII,S$GLB,, | Performed by: NURSE PRACTITIONER

## 2021-12-16 PROCEDURE — 90686 FLU VACCINE (QUAD) GREATER THAN OR EQUAL TO 3YO PRESERVATIVE FREE IM: ICD-10-PCS | Mod: S$GLB,,, | Performed by: NURSE PRACTITIONER

## 2021-12-16 PROCEDURE — 90686 IIV4 VACC NO PRSV 0.5 ML IM: CPT | Mod: S$GLB,,, | Performed by: NURSE PRACTITIONER

## 2021-12-16 PROCEDURE — 99396 PR PREVENTIVE VISIT,EST,40-64: ICD-10-PCS | Mod: 25,S$GLB,, | Performed by: NURSE PRACTITIONER

## 2021-12-16 PROCEDURE — 90471 IMMUNIZATION ADMIN: CPT | Mod: S$GLB,,, | Performed by: NURSE PRACTITIONER

## 2021-12-16 PROCEDURE — 99999 PR PBB SHADOW E&M-EST. PATIENT-LVL V: ICD-10-PCS | Mod: PBBFAC,,, | Performed by: NURSE PRACTITIONER

## 2021-12-16 PROCEDURE — 99999 PR PBB SHADOW E&M-EST. PATIENT-LVL V: CPT | Mod: PBBFAC,,, | Performed by: NURSE PRACTITIONER

## 2021-12-16 RX ORDER — AMLODIPINE BESYLATE 5 MG/1
5 TABLET ORAL DAILY
Qty: 90 TABLET | Refills: 0 | Status: CANCELLED | OUTPATIENT
Start: 2021-12-16

## 2021-12-16 RX ORDER — LEVOTHYROXINE SODIUM 50 UG/1
50 TABLET ORAL DAILY
Qty: 90 TABLET | Refills: 1 | Status: SHIPPED | OUTPATIENT
Start: 2021-12-16 | End: 2022-06-04 | Stop reason: SDUPTHER

## 2021-12-16 RX ORDER — DULOXETIN HYDROCHLORIDE 30 MG/1
60 CAPSULE, DELAYED RELEASE ORAL DAILY
Qty: 90 CAPSULE | Refills: 0 | Status: SHIPPED | OUTPATIENT
Start: 2021-12-16 | End: 2022-01-20 | Stop reason: SDUPTHER

## 2021-12-16 RX ORDER — AMLODIPINE AND VALSARTAN 5; 320 MG/1; MG/1
1 TABLET ORAL DAILY
Qty: 90 TABLET | Refills: 0 | Status: SHIPPED | OUTPATIENT
Start: 2021-12-16 | End: 2022-03-06 | Stop reason: SDUPTHER

## 2021-12-16 RX ORDER — EZETIMIBE 10 MG/1
10 TABLET ORAL DAILY
Qty: 90 TABLET | Refills: 1 | Status: SHIPPED | OUTPATIENT
Start: 2021-12-16 | End: 2022-06-04 | Stop reason: SDUPTHER

## 2021-12-16 RX ORDER — VALSARTAN AND HYDROCHLOROTHIAZIDE 160; 25 MG/1; MG/1
1 TABLET ORAL DAILY
Qty: 90 TABLET | Refills: 0 | Status: CANCELLED | OUTPATIENT
Start: 2021-12-16 | End: 2022-12-16

## 2021-12-16 RX ORDER — ACETAMINOPHEN AND CODEINE PHOSPHATE 300; 30 MG/1; MG/1
1 TABLET ORAL EVERY 6 HOURS PRN
COMMUNITY
Start: 2021-09-29 | End: 2021-12-16

## 2021-12-21 ENCOUNTER — TELEPHONE (OUTPATIENT)
Dept: FAMILY MEDICINE | Facility: CLINIC | Age: 66
End: 2021-12-21
Payer: COMMERCIAL

## 2022-01-11 ENCOUNTER — HOSPITAL ENCOUNTER (OUTPATIENT)
Dept: RADIOLOGY | Facility: HOSPITAL | Age: 67
Discharge: HOME | End: 2022-01-11
Attending: NURSE PRACTITIONER
Payer: COMMERCIAL

## 2022-01-11 DIAGNOSIS — Z12.31 ENCOUNTER FOR SCREENING MAMMOGRAM FOR MALIGNANT NEOPLASM OF BREAST: ICD-10-CM

## 2022-01-11 PROCEDURE — 77067 MAMMO DIGITAL SCREENING BILAT WITH TOMO: ICD-10-PCS | Mod: 26,,, | Performed by: RADIOLOGY

## 2022-01-11 PROCEDURE — 77067 SCR MAMMO BI INCL CAD: CPT | Mod: TC,PO

## 2022-01-11 PROCEDURE — 77063 MAMMO DIGITAL SCREENING BILAT WITH TOMO: ICD-10-PCS | Mod: 26,,, | Performed by: RADIOLOGY

## 2022-01-11 PROCEDURE — 77063 BREAST TOMOSYNTHESIS BI: CPT | Mod: 26,,, | Performed by: RADIOLOGY

## 2022-01-11 PROCEDURE — 77067 SCR MAMMO BI INCL CAD: CPT | Mod: 26,,, | Performed by: RADIOLOGY

## 2022-01-11 PROCEDURE — 77063 BREAST TOMOSYNTHESIS BI: CPT | Mod: TC,PO

## 2022-01-20 DIAGNOSIS — F43.0 STRESS REACTION CAUSING MIXED DISTURBANCE OF EMOTION AND CONDUCT: ICD-10-CM

## 2022-01-20 RX ORDER — DULOXETIN HYDROCHLORIDE 30 MG/1
60 CAPSULE, DELAYED RELEASE ORAL DAILY
Qty: 90 CAPSULE | Refills: 0 | Status: SHIPPED | OUTPATIENT
Start: 2022-01-20 | End: 2022-03-17 | Stop reason: SDUPTHER

## 2022-02-10 ENCOUNTER — OFFICE VISIT (OUTPATIENT)
Dept: FAMILY MEDICINE | Facility: CLINIC | Age: 67
End: 2022-02-10
Payer: COMMERCIAL

## 2022-02-10 VITALS
BODY MASS INDEX: 28.97 KG/M2 | WEIGHT: 191.13 LBS | DIASTOLIC BLOOD PRESSURE: 74 MMHG | TEMPERATURE: 98 F | OXYGEN SATURATION: 98 % | HEIGHT: 68 IN | HEART RATE: 85 BPM | RESPIRATION RATE: 18 BRPM | SYSTOLIC BLOOD PRESSURE: 120 MMHG

## 2022-02-10 DIAGNOSIS — F33.42 RECURRENT MAJOR DEPRESSIVE DISORDER, IN FULL REMISSION: Primary | ICD-10-CM

## 2022-02-10 DIAGNOSIS — I10 ESSENTIAL HYPERTENSION: ICD-10-CM

## 2022-02-10 DIAGNOSIS — E78.2 MIXED HYPERLIPIDEMIA: ICD-10-CM

## 2022-02-10 DIAGNOSIS — Z23 NEED FOR TDAP VACCINATION: ICD-10-CM

## 2022-02-10 DIAGNOSIS — Z86.010 PERSONAL HISTORY OF COLONIC POLYPS: ICD-10-CM

## 2022-02-10 DIAGNOSIS — R73.01 IFG (IMPAIRED FASTING GLUCOSE): ICD-10-CM

## 2022-02-10 DIAGNOSIS — E89.0 POSTSURGICAL HYPOTHYROIDISM: ICD-10-CM

## 2022-02-10 PROCEDURE — 99999 PR PBB SHADOW E&M-EST. PATIENT-LVL V: CPT | Mod: PBBFAC,,, | Performed by: NURSE PRACTITIONER

## 2022-02-10 PROCEDURE — 90471 TDAP VACCINE GREATER THAN OR EQUAL TO 7YO IM: ICD-10-PCS | Mod: S$GLB,,, | Performed by: NURSE PRACTITIONER

## 2022-02-10 PROCEDURE — 99999 PR PBB SHADOW E&M-EST. PATIENT-LVL V: ICD-10-PCS | Mod: PBBFAC,,, | Performed by: NURSE PRACTITIONER

## 2022-02-10 PROCEDURE — 90715 TDAP VACCINE GREATER THAN OR EQUAL TO 7YO IM: ICD-10-PCS | Mod: S$GLB,,, | Performed by: NURSE PRACTITIONER

## 2022-02-10 PROCEDURE — 3074F PR MOST RECENT SYSTOLIC BLOOD PRESSURE < 130 MM HG: ICD-10-PCS | Mod: CPTII,S$GLB,, | Performed by: NURSE PRACTITIONER

## 2022-02-10 PROCEDURE — 1159F MED LIST DOCD IN RCRD: CPT | Mod: CPTII,S$GLB,, | Performed by: NURSE PRACTITIONER

## 2022-02-10 PROCEDURE — 90471 IMMUNIZATION ADMIN: CPT | Mod: S$GLB,,, | Performed by: NURSE PRACTITIONER

## 2022-02-10 PROCEDURE — 1160F RVW MEDS BY RX/DR IN RCRD: CPT | Mod: CPTII,S$GLB,, | Performed by: NURSE PRACTITIONER

## 2022-02-10 PROCEDURE — 1159F PR MEDICATION LIST DOCUMENTED IN MEDICAL RECORD: ICD-10-PCS | Mod: CPTII,S$GLB,, | Performed by: NURSE PRACTITIONER

## 2022-02-10 PROCEDURE — 99214 PR OFFICE/OUTPT VISIT, EST, LEVL IV, 30-39 MIN: ICD-10-PCS | Mod: 25,S$GLB,, | Performed by: NURSE PRACTITIONER

## 2022-02-10 PROCEDURE — 90715 TDAP VACCINE 7 YRS/> IM: CPT | Mod: S$GLB,,, | Performed by: NURSE PRACTITIONER

## 2022-02-10 PROCEDURE — 1160F PR REVIEW ALL MEDS BY PRESCRIBER/CLIN PHARMACIST DOCUMENTED: ICD-10-PCS | Mod: CPTII,S$GLB,, | Performed by: NURSE PRACTITIONER

## 2022-02-10 PROCEDURE — 3074F SYST BP LT 130 MM HG: CPT | Mod: CPTII,S$GLB,, | Performed by: NURSE PRACTITIONER

## 2022-02-10 PROCEDURE — 3078F PR MOST RECENT DIASTOLIC BLOOD PRESSURE < 80 MM HG: ICD-10-PCS | Mod: CPTII,S$GLB,, | Performed by: NURSE PRACTITIONER

## 2022-02-10 PROCEDURE — 3078F DIAST BP <80 MM HG: CPT | Mod: CPTII,S$GLB,, | Performed by: NURSE PRACTITIONER

## 2022-02-10 PROCEDURE — 99214 OFFICE O/P EST MOD 30 MIN: CPT | Mod: 25,S$GLB,, | Performed by: NURSE PRACTITIONER

## 2022-02-10 PROCEDURE — 3008F PR BODY MASS INDEX (BMI) DOCUMENTED: ICD-10-PCS | Mod: CPTII,S$GLB,, | Performed by: NURSE PRACTITIONER

## 2022-02-10 PROCEDURE — 3008F BODY MASS INDEX DOCD: CPT | Mod: CPTII,S$GLB,, | Performed by: NURSE PRACTITIONER

## 2022-02-10 RX ORDER — ETODOLAC 400 MG/1
400 TABLET, FILM COATED ORAL EVERY 8 HOURS
COMMUNITY
Start: 2021-09-23 | End: 2022-02-10 | Stop reason: SDUPTHER

## 2022-02-10 NOTE — PROGRESS NOTES
"Subjective:       Patient ID: Gudelia Cerrato is a 66 y.o. female.    Chief Complaint: Hypertension (F/U)    HPI    Patient is a 66-year-old white female with hypertension, hyperlipidemia, IFG, postsurgical hypothyroidism, history of hepatitis C, on hormone replacement therapy, chronic idiopathic recurrent syncope and collapse and osteoarthritis that is here today for 1 month follow up.  All other chronic problems were addressed at December 2021 visit.     Patient has Hypertension that is now controlled onValsartan-Amlodipine 320-5 mg daily.  HCTZ stopped due to complaints of urinary frequency and incontinence.   /74   Pulse 85   Temp 98.1 °F (36.7 °C) (Temporal)   Resp 18   Ht 5' 7.72" (1.72 m)   Wt 86.7 kg (191 lb 2.2 oz)   SpO2 98%   BMI 29.30 kg/m²      Patient had complaint of bladder incontinence that did improve with stopping the HCTZ.     Patient has hyperlipidemia -65 year old with hypertension and elevated levels - 10 year risk 5.6% - unable to tolerate Rosuvastatin 5 mg daily due to myalgias/leg cramps.  Started Zetia 10 mg daily in December 2021 - due to recheck in 6 months.     Patient has postsurgical hypothyroidism and takes brand Synthroid 50 mcg daily and controlled on this dose with TSH 1.58 and free T4 1.00 in December 2021 - will recheck TSH with next blood draw.     Patient has a history of depression back in 2015 while getting the divorce and was on Cymbalta 60 mg daily.  Patient had reported that when Cymbalta changed to the generic it was no longer effective so she required the brand Cymbalta back in 2015 and was  able to get off the medicine around 2016.  Patient reported at October 2018 visit that she was again having increased stress/anxiety/and depressed mood due to family stressors daily with parents Health conditions.  And she also reported polyarthralgias that she found the Cymbalta helped with chronic musculoskeletal pain as well as mood in the past so I started patient " back on Cymbalta 30 mg at October 2018 visit and titrated up to 60 mg daily.  Patient reports she was unable to afford the BRAND Cymbalta as it would have been over $800  So has been taking generic Cymbalta 60 mg daily since October 2018 and tolerating well.  I added on Trazodone at bedtime for for sleep disturbances and the combination is working well. Patient weaned down on the generic Cymbalta to 30 mg daily since December 2021 and tolerating this new dose well.     Patient has polyarthralgias and takes Celebrex 200 mg daily.  She is also followed by Orthopedic MD Dr. Juan Steel. Patient asking to establish providers in [place] as she now lives on the [place].  She wants to see a Rheumatogist for polyarthralgias and she want to see another Orthopedic at [facility] for knees and feet.  Advised patient that she can schedule NEW PROVIDER appt for specialities over My CHART portal to establish with providers in her area.     Patient is on hormone replacement therapy by her gyn MD.     Patient has Chronic Recurrent Idiopathic Syncopal Episodes.  She has had workups from Neurologist and Cardiologist and Electrophysiologists in past and all testing negative.  Reports family history of idiopathic syncope.     Patient had  in December 2020.   in December 2021 - will recheck in 6 months with next visit.    Past Medical History:   Diagnosis Date    Depression     GERD (gastroesophageal reflux disease)     Hepatitis C 2000    Treated with Combination Interferon and told she is Cured by [facility] Brice Correa [facility]    Hyperlipidemia     Hypertension     Postsurgical hypothyroidism     Reflux     Syncope and collapse 2013    known recurring syncope and collapse - has had a complete workup from a Neurological specialist, from a Cardiac Specialist with Electrophysiologist and all testing completely negative; Idiopathic Syncope does run in the family    Urge incontinence of urine        Past  Surgical History:   Procedure Laterality Date    breast reduction      BREAST SURGERY  8/1996    Reduction    COLONOSCOPY  11/6/2013    + polyp - Repeat in 5 years    COLONOSCOPY  02/13/2017    + polyp - repeat in 5 years - Dr. Gutiérrez    EYE SURGERY  4/4/19    Right eye-Laser to lower pressure    HYSTERECTOMY      Partial - still has ovaries    JOINT REPLACEMENT  8/13/2019    Left Hip    ROTATOR CUFF REPAIR      SPINE SURGERY  8/22/13    bone spur to cervical spine removed    THYROIDECTOMY Right     Partial - right lobe removed    TONSILLECTOMY      TOTAL HIP ARTHROPLASTY      TOTAL REDUCTION MAMMOPLASTY Bilateral     age 41       Family History   Problem Relation Age of Onset    Colon cancer Paternal Grandmother     Cancer Paternal Grandmother     Arthritis Paternal Grandmother     Colon cancer Maternal Grandmother     Cancer Maternal Grandmother         colon    Arthritis Maternal Grandmother     Hyperlipidemia Mother     Stroke Mother 78        stents placed for carotid blockage; multiple TIAs    Vision loss Mother     Arthritis Mother     Hypertension Father     Stroke Father 84    Hyperlipidemia Father     Vision loss Father     Arthritis Father     Parkinsonism Maternal Grandfather         passed in his 70's    Heart disease Paternal Grandfather     Asthma Sister         in her 50's    Hypertension Brother     Hypertension Sister     Arthritis Sister     Hypertension Sister     Mental illness Sister         Undiagnosed    Diabetes Maternal Aunt     Heart disease Maternal Aunt     Diabetes Maternal Aunt     Breast cancer Neg Hx     Ovarian cancer Neg Hx        Social History     Socioeconomic History    Marital status:    Tobacco Use    Smoking status: Never Smoker    Smokeless tobacco: Never Used   Substance and Sexual Activity    Alcohol use: Yes     Alcohol/week: 3.0 standard drinks     Types: 1 Glasses of wine, 1 Cans of beer, 1 Shots of liquor per week      Comment: Social drinker, amount depends on social engagements    Drug use: No    Sexual activity: Yes     Partners: Male     Birth control/protection: Post-menopausal     Social Determinants of Health     Financial Resource Strain: Unknown    Difficulty of Paying Living Expenses: Patient refused   Food Insecurity: Unknown    Worried About Running Out of Food in the Last Year: Patient refused    Ran Out of Food in the Last Year: Patient refused   Transportation Needs: Unknown    Lack of Transportation (Medical): Patient refused    Lack of Transportation (Non-Medical): Patient refused   Physical Activity: Insufficiently Active    Days of Exercise per Week: 2 days    Minutes of Exercise per Session: 30 min   Stress: Stress Concern Present    Feeling of Stress : Very much   Social Connections: Unknown    Frequency of Communication with Friends and Family: More than three times a week    Frequency of Social Gatherings with Friends and Family: Twice a week    Active Member of Clubs or Organizations: Yes    Attends Club or Organization Meetings: More than 4 times per year    Marital Status:    Housing Stability: Unknown    Unable to Pay for Housing in the Last Year: No    Number of Places Lived in the Last Year: 2    Unstable Housing in the Last Year: Patient refused       Review of Systems   Constitutional: Negative for appetite change, chills, fatigue, fever and unexpected weight change.   HENT: Negative for congestion, ear pain, mouth sores, nosebleeds, postnasal drip, rhinorrhea, sinus pressure, sneezing, sore throat, trouble swallowing and voice change.    Eyes: Negative for photophobia, pain, discharge, redness, itching and visual disturbance.   Respiratory: Negative for cough, chest tightness and shortness of breath.    Cardiovascular: Negative for chest pain, palpitations and leg swelling.   Gastrointestinal: Negative for abdominal pain, blood in stool, constipation, diarrhea, nausea  "and vomiting.   Genitourinary: Negative for dysuria, frequency, hematuria and urgency.   Musculoskeletal: Positive for arthralgias. Negative for back pain, joint swelling and myalgias.        Multiple joint pains   Skin: Negative for color change and rash.   Allergic/Immunologic: Negative for immunocompromised state.   Neurological: Negative for dizziness, seizures, syncope, weakness and headaches.   Hematological: Negative for adenopathy. Does not bruise/bleed easily.   Psychiatric/Behavioral: Negative for agitation, dysphoric mood, sleep disturbance and suicidal ideas. The patient is not nervous/anxious.          Objective:     Vitals:    02/10/22 1352   BP: 120/74   Pulse: 85   Resp: 18   Temp: 98.1 °F (36.7 °C)   TempSrc: Temporal   SpO2: 98%   Weight: 86.7 kg (191 lb 2.2 oz)   Height: 5' 7.72" (1.72 m)          Physical Exam  Constitutional:       General: She is not in acute distress.     Appearance: Normal appearance. She is well-developed. She is obese. She is not ill-appearing, toxic-appearing or diaphoretic.      Comments: + obesity. Body mass index is 29.3 kg/m².   HENT:      Head: Normocephalic and atraumatic.      Right Ear: Tympanic membrane and external ear normal.      Left Ear: Tympanic membrane and external ear normal.      Nose: Nose normal.      Mouth/Throat:      Pharynx: No oropharyngeal exudate.   Eyes:      General: No scleral icterus.        Right eye: No discharge.         Left eye: No discharge.      Extraocular Movements: Extraocular movements intact.      Pupils: Pupils are equal, round, and reactive to light.   Neck:      Thyroid: No thyromegaly.      Vascular: No JVD.      Trachea: No tracheal deviation.   Cardiovascular:      Rate and Rhythm: Normal rate and regular rhythm.      Heart sounds: Normal heart sounds. No murmur heard.      Pulmonary:      Effort: Pulmonary effort is normal. No respiratory distress.      Breath sounds: Normal breath sounds. No stridor. No wheezing, rhonchi " or rales.   Abdominal:      General: There is no distension.      Palpations: Abdomen is soft. There is no mass.      Tenderness: There is no abdominal tenderness. There is no guarding.   Musculoskeletal:         General: No swelling or deformity. Normal range of motion.      Cervical back: Normal range of motion and neck supple.      Right lower leg: No edema.      Left lower leg: No edema.   Lymphadenopathy:      Cervical: No cervical adenopathy.   Skin:     General: Skin is warm and dry.      Findings: No rash.   Neurological:      Mental Status: She is alert and oriented to person, place, and time.      Cranial Nerves: No cranial nerve deficit.      Coordination: Coordination normal.   Psychiatric:         Mood and Affect: Mood normal.         Behavior: Behavior normal.         Thought Content: Thought content normal.         Judgment: Judgment normal.           Assessment:         ICD-10-CM ICD-9-CM   1. Recurrent major depressive disorder, in full remission  F33.42 296.36   2. Essential hypertension  I10 401.9   3. Mixed hyperlipidemia  E78.2 272.2   4. Postsurgical hypothyroidism  E89.0 244.0   5. Personal history of colonic polyps  Z86.010 V12.72   6. Need for Tdap vaccination  Z23 V06.1   7. IFG (impaired fasting glucose)  R73.01 790.21       Plan:       Recurrent major depressive disorder, in full remission  -  Doing well on decreased dose of 30 mg Cymbalta daily    Essential hypertension  Continue current medication(s).  Follow up in 6 months.  -     Comprehensive Metabolic Panel; Future; Expected date: 02/10/2022    Mixed hyperlipidemia  -  Continue current medication(s).  Follow up in 6 months.  -     Lipid Panel; Future; Expected date: 02/10/2022    Postsurgical hypothyroidism  Continue current medication(s).  Follow up in 6 months.  -     TSH; Future; Expected date: 02/10/2022    Personal history of colonic polyps  -  Refer to [facility] to establish care to schedule repeat colonoscopy  -      Ambulatory referral/consult to Gastroenterology; Future; Expected date: 02/17/2022    Need for Tdap vaccination  -     (In Office Administered) Tdap Vaccine    IFG (impaired fasting glucose)  -     Hemoglobin A1C; Future; Expected date: 02/10/2022      Follow up in about 6 months (around 8/10/2022) for fasting labs and follow up.     Patient's Medications   New Prescriptions    No medications on file   Previous Medications    AMLODIPINE-VALSARTAN (EXFORGE) 5-320 MG PER TABLET    Take 1 tablet by mouth once daily.    BUTALBITAL-ACETAMINOPHEN-CAFFEINE -40 MG (FIORICET, ESGIC) -40 MG PER TABLET    Take 1 tablet by mouth every 4 (four) hours as needed.    CALCIUM CRB,CIT/D3/MIN34/NELLY (CITRACAL + BONE DENSITY ORAL)    Citracal + Bone Density    CELECOXIB (CELEBREX) 200 MG CAPSULE    Take 1 capsule (200 mg total) by mouth every morning.    CEPHALEXIN (KEFLEX) 500 MG CAPSULE    take 1 capsule by mouth four times daily    DULOXETINE (CYMBALTA) 30 MG CAPSULE    Take 2 capsules (60 mg total) by mouth once daily.    ESTRADIOL (ESTRACE) 1 MG TABLET    Take 1 tablet (1 mg total) by mouth once daily.    ETODOLAC (LODINE XL) 400 MG 24 HR TABLET    Take 1 tablet by mouth three times daily as needed for pain    EZETIMIBE (ZETIA) 10 MG TABLET    Take 1 tablet (10 mg total) by mouth once daily.    MULTIVITAMIN/IRON/FOLIC ACID (CENTRUM WOMEN ORAL)    Centrum    SYNTHROID 50 MCG TABLET    Take 1 tablet (50 mcg total) by mouth once daily.    TRAZODONE (DESYREL) 50 MG TABLET    Take 1 tablet (50 mg) by mouth at bedtime as needed for insomnia.  May titrate by one half-tablet (25mg) every 2 nights up to 4 tablets (200 mg) nightly for effectiveness.   Modified Medications    No medications on file   Discontinued Medications    ETODOLAC (LODINE) 400 MG TABLET    Take 400 mg by mouth every 8 (eight) hours.

## 2022-02-17 ENCOUNTER — TELEPHONE (OUTPATIENT)
Dept: UROGYNECOLOGY | Facility: CLINIC | Age: 67
End: 2022-02-17
Payer: COMMERCIAL

## 2022-02-17 NOTE — TELEPHONE ENCOUNTER
I reached out to patient to confirm upcoming appointment date, time and reason for visit. Patient was instructed to give the office a call back to confirm.    Glenn Peck MA

## 2022-02-22 ENCOUNTER — PATIENT OUTREACH (OUTPATIENT)
Dept: ADMINISTRATIVE | Facility: OTHER | Age: 67
End: 2022-02-22
Payer: COMMERCIAL

## 2022-02-22 NOTE — PROGRESS NOTES
Health Maintenance Due   Topic Date Due    Colorectal Cancer Screening  02/13/2022     Updates were requested from care everywhere.  Chart was reviewed for overdue Proactive [facility] Encounters (SHANTAL) topics (CRS, Breast Cancer Screening, Eye exam)  Health Maintenance has been updated.  LINKS immunization registry triggered.  Immunizations were reconciled.

## 2022-02-23 ENCOUNTER — OFFICE VISIT (OUTPATIENT)
Dept: GASTROENTEROLOGY | Facility: CLINIC | Age: 67
End: 2022-02-23
Payer: COMMERCIAL

## 2022-02-23 VITALS — BODY MASS INDEX: 29.17 KG/M2 | WEIGHT: 192.44 LBS | HEIGHT: 68 IN

## 2022-02-23 DIAGNOSIS — Z01.818 PRE-OP TESTING: ICD-10-CM

## 2022-02-23 DIAGNOSIS — Z86.010 HISTORY OF COLON POLYPS: ICD-10-CM

## 2022-02-23 DIAGNOSIS — R15.2 INCONTINENCE OF FECES WITH FECAL URGENCY: ICD-10-CM

## 2022-02-23 DIAGNOSIS — R10.9 ABDOMINAL CRAMPING: ICD-10-CM

## 2022-02-23 DIAGNOSIS — Z80.0 FAMILY HISTORY OF COLON CANCER: ICD-10-CM

## 2022-02-23 DIAGNOSIS — R19.8 IRREGULAR BOWEL HABITS: ICD-10-CM

## 2022-02-23 DIAGNOSIS — K52.9 CHRONIC DIARRHEA: Primary | ICD-10-CM

## 2022-02-23 DIAGNOSIS — R15.9 INCONTINENCE OF FECES WITH FECAL URGENCY: ICD-10-CM

## 2022-02-23 PROCEDURE — 99999 PR PBB SHADOW E&M-EST. PATIENT-LVL IV: ICD-10-PCS | Mod: PBBFAC,,, | Performed by: NURSE PRACTITIONER

## 2022-02-23 PROCEDURE — 1160F PR REVIEW ALL MEDS BY PRESCRIBER/CLIN PHARMACIST DOCUMENTED: ICD-10-PCS | Mod: CPTII,S$GLB,, | Performed by: NURSE PRACTITIONER

## 2022-02-23 PROCEDURE — 99204 PR OFFICE/OUTPT VISIT, NEW, LEVL IV, 45-59 MIN: ICD-10-PCS | Mod: S$GLB,,, | Performed by: NURSE PRACTITIONER

## 2022-02-23 PROCEDURE — 1159F PR MEDICATION LIST DOCUMENTED IN MEDICAL RECORD: ICD-10-PCS | Mod: CPTII,S$GLB,, | Performed by: NURSE PRACTITIONER

## 2022-02-23 PROCEDURE — 1160F RVW MEDS BY RX/DR IN RCRD: CPT | Mod: CPTII,S$GLB,, | Performed by: NURSE PRACTITIONER

## 2022-02-23 PROCEDURE — 99999 PR PBB SHADOW E&M-EST. PATIENT-LVL IV: CPT | Mod: PBBFAC,,, | Performed by: NURSE PRACTITIONER

## 2022-02-23 PROCEDURE — 99204 OFFICE O/P NEW MOD 45 MIN: CPT | Mod: S$GLB,,, | Performed by: NURSE PRACTITIONER

## 2022-02-23 PROCEDURE — 3008F BODY MASS INDEX DOCD: CPT | Mod: CPTII,S$GLB,, | Performed by: NURSE PRACTITIONER

## 2022-02-23 PROCEDURE — 3008F PR BODY MASS INDEX (BMI) DOCUMENTED: ICD-10-PCS | Mod: CPTII,S$GLB,, | Performed by: NURSE PRACTITIONER

## 2022-02-23 PROCEDURE — 1159F MED LIST DOCD IN RCRD: CPT | Mod: CPTII,S$GLB,, | Performed by: NURSE PRACTITIONER

## 2022-02-23 RX ORDER — DICYCLOMINE HYDROCHLORIDE 10 MG/1
10 CAPSULE ORAL
Qty: 120 CAPSULE | Refills: 0 | Status: SHIPPED | OUTPATIENT
Start: 2022-02-23 | End: 2022-03-25

## 2022-02-23 NOTE — PROGRESS NOTES
Subjective:       Patient ID: Gudelia Cerrato is a 66 y.o. female, Body mass index is 29.52 kg/m².    Chief Complaint: Diarrhea      Patient is new to me. Referred by Ya Agustin NP for personal history of colonic polyps.     Diarrhea   This is a chronic problem. The current episode started more than 1 year ago. The problem occurs 2 to 4 times per day (occurs after eating). The problem has been unchanged. The stool consistency is described as watery (describes stool as formed or liquid; denies bloody). The patient states that diarrhea does not awaken her from sleep. Associated symptoms include abdominal pain (generalized abdominal cramping; intermittent; relieved with bowel movement). Pertinent negatives include no bloating, chills, coughing, fever, increased  flatus, vomiting or weight loss. Associated symptoms comments: Associated symptoms also include fecal urgency with occ fecal incontinence. Exacerbated by: eating. Risk factors include recent antibiotic use (denies recent hospitalization or foreign travel). She has tried nothing for the symptoms. There is no history of bowel resection, inflammatory bowel disease, irritable bowel syndrome or a recent abdominal surgery.     Review of Systems   Constitutional: Negative for appetite change, chills, fever, unexpected weight change and weight loss.   HENT: Negative for trouble swallowing.    Respiratory: Negative for cough and shortness of breath.    Cardiovascular: Negative for chest pain.   Gastrointestinal: Positive for abdominal pain (generalized abdominal cramping; intermittent; relieved with bowel movement) and diarrhea. Negative for abdominal distention, anal bleeding, bloating, blood in stool, constipation, flatus, nausea, rectal pain and vomiting.   Genitourinary: Negative for difficulty urinating and dysuria.   Musculoskeletal: Negative for gait problem.   Skin: Negative for rash.   Neurological: Negative for speech difficulty.   Psychiatric/Behavioral:  Negative for confusion.       Past Medical History:   Diagnosis Date    Colon polyp     Depression     GERD (gastroesophageal reflux disease)     Hepatitis C 2000    Treated with Combination Interferon and told she is [facility]    Hyperlipidemia     Hypertension     Postsurgical hypothyroidism     Reflux     Syncope and collapse 2013    known recurring syncope and collapse - has had a complete workup from a Neurological specialist, from a Cardiac Specialist with Electrophysiologist and all testing completely negative; Idiopathic Syncope does run in the family    Urge incontinence of urine       Past Surgical History:   Procedure Laterality Date    breast reduction      BREAST SURGERY  8/1996    Reduction    COLONOSCOPY  11/6/2013    + polyp - Repeat in 5 years    COLONOSCOPY  02/13/2017    + polyp - repeat in 5 years - Dr. Gutiérrez    EYE SURGERY  4/4/19    Right eye-Laser to lower pressure    HYSTERECTOMY      Partial - still has ovaries    JOINT REPLACEMENT  8/13/2019    Left Hip    ROTATOR CUFF REPAIR      SPINE SURGERY  8/22/13    bone spur to cervical spine removed    THYROIDECTOMY Right     Partial - right lobe removed    TONSILLECTOMY      TOTAL HIP ARTHROPLASTY      TOTAL REDUCTION MAMMOPLASTY Bilateral     age 41    UPPER GASTROINTESTINAL ENDOSCOPY        Family History   Problem Relation Age of Onset    Colon cancer Paternal Grandmother     Cancer Paternal Grandmother     Arthritis Paternal Grandmother     Colon cancer Maternal Grandmother     Cancer Maternal Grandmother         colon    Arthritis Maternal Grandmother     Hyperlipidemia Mother     Stroke Mother 78        stents placed for carotid blockage; multiple TIAs    Vision loss Mother     Arthritis Mother     Hypertension Father     Stroke Father 84    Hyperlipidemia Father     Vision loss Father     Arthritis Father     Parkinsonism Maternal Grandfather         passed in his 70's     Heart disease Paternal Grandfather     Asthma Sister         in her 50's    Hypertension Brother     Hypertension Sister     Arthritis Sister     Hypertension Sister     Mental illness Sister         Undiagnosed    Diabetes Maternal Aunt     Heart disease Maternal Aunt     Diabetes Maternal Aunt     Breast cancer Neg Hx     Ovarian cancer Neg Hx       Wt Readings from Last 10 Encounters:   02/23/22 87.3 kg (192 lb 7.4 oz)   02/10/22 86.7 kg (191 lb 2.2 oz)   12/16/21 86.4 kg (190 lb 5.9 oz)   06/11/21 83.8 kg (184 lb 13.7 oz)   12/11/20 89.6 kg (197 lb 8.5 oz)   07/08/20 91.6 kg (201 lb 15.1 oz)   12/11/19 88.6 kg (195 lb 5.2 oz)   07/18/19 88 kg (194 lb)   05/22/19 89.4 kg (197 lb)   11/20/18 86.6 kg (190 lb 14.7 oz)     Lab Results   Component Value Date    WBC 4.58 12/13/2021    HGB 15.3 12/13/2021    HCT 44.0 12/13/2021    MCV 89 12/13/2021     12/13/2021     CMP  Sodium   Date Value Ref Range Status   12/13/2021 141 136 - 145 mmol/L Final     Potassium   Date Value Ref Range Status   12/13/2021 3.3 (L) 3.5 - 5.1 mmol/L Final     Chloride   Date Value Ref Range Status   12/13/2021 101 95 - 110 mmol/L Final     CO2   Date Value Ref Range Status   12/13/2021 28 23 - 29 mmol/L Final     Glucose   Date Value Ref Range Status   12/13/2021 100 70 - 110 mg/dL Final     BUN   Date Value Ref Range Status   12/13/2021 11 8 - 23 mg/dL Final     Creatinine   Date Value Ref Range Status   12/13/2021 0.8 0.5 - 1.4 mg/dL Final     Calcium   Date Value Ref Range Status   12/13/2021 9.1 8.7 - 10.5 mg/dL Final     Total Protein   Date Value Ref Range Status   12/13/2021 6.5 6.0 - 8.4 g/dL Final     Albumin   Date Value Ref Range Status   12/13/2021 3.8 3.5 - 5.2 g/dL Final     Total Bilirubin   Date Value Ref Range Status   12/13/2021 0.6 0.1 - 1.0 mg/dL Final     Comment:     For infants and newborns, interpretation of results should be based  on gestational age, weight and in agreement with  clinical  observations.    Premature Infant recommended reference ranges:  Up to 24 hours.............<8.0 mg/dL  Up to 48 hours............<12.0 mg/dL  3-5 days..................<15.0 mg/dL  6-29 days.................<15.0 mg/dL       Alkaline Phosphatase   Date Value Ref Range Status   12/13/2021 66 55 - 135 U/L Final     AST   Date Value Ref Range Status   12/13/2021 19 10 - 40 U/L Final     ALT   Date Value Ref Range Status   12/13/2021 21 10 - 44 U/L Final     Anion Gap   Date Value Ref Range Status   12/13/2021 12 8 - 16 mmol/L Final     eGFR if    Date Value Ref Range Status   12/13/2021 >60.0 >60 mL/min/1.73 m^2 Final     eGFR if non    Date Value Ref Range Status   12/13/2021 >60.0 >60 mL/min/1.73 m^2 Final     Comment:     Calculation used to obtain the estimated glomerular filtration  rate (eGFR) is the CKD-EPI equation.                 Reviewed prior medical records including endoscopy history (see surgical history).     Objective:      Physical Exam  Constitutional:       General: She is not in acute distress.     Appearance: She is well-developed.   HENT:      Head: Normocephalic.      Right Ear: Hearing normal.      Left Ear: Hearing normal.      Nose: Nose normal.      Mouth/Throat:      Comments: Pt wearing mask due to COVID concerns  Eyes:      General: Lids are normal.      Conjunctiva/sclera: Conjunctivae normal.      Pupils: Pupils are equal, round, and reactive to light.   Neck:      Trachea: Trachea normal.   Cardiovascular:      Rate and Rhythm: Normal rate and regular rhythm.      Heart sounds: Normal heart sounds. No murmur heard.  Pulmonary:      Effort: Pulmonary effort is normal. No respiratory distress.      Breath sounds: Normal breath sounds. No stridor. No wheezing.   Abdominal:      General: Bowel sounds are normal. There is no distension.      Palpations: Abdomen is soft. There is no mass.      Tenderness: There is no abdominal tenderness. There is  no guarding or rebound.   Musculoskeletal:         General: Normal range of motion.      Cervical back: Normal range of motion.   Skin:     General: Skin is warm and dry.      Findings: No rash.      Comments: Non jaundiced   Neurological:      Mental Status: She is alert and oriented to person, place, and time.   Psychiatric:         Speech: Speech normal.         Behavior: Behavior normal. Behavior is cooperative.           Assessment:       1. Chronic diarrhea    2. Irregular bowel habits    3. Incontinence of feces with fecal urgency    4. Abdominal cramping    5. History of colon polyps    6. Family history of colon cancer           Plan:   All diagnoses and orders for this visit:    Chronic diarrhea & Irregular bowel habits  - Giardia / Cryptosporidum, EIA; Future; Expected date: 02/23/2022  - Stool Exam-Ova,Cysts,Parasites; Future; Expected date: 02/23/2022  - Stool culture; Future; Expected date: 02/23/2022  - WBC, Stool; Future; Expected date: 02/23/2022  - Occult blood x 1, stool; Future; Expected date: 02/23/2022  - pH, stool; Future; Expected date: 02/23/2022  - Clostridium difficile EIA; Future; Expected date: 02/23/2022  - Recommended increase fiber in diet, especially soluble fiber since this can help bulk up the stool consistency and may help to slow down how fast the stool goes through the colon and can prevent diarrhea  - Start Benefiber daily  - Start: dicyclomine (BENTYL) 10 MG capsule; Take 1 capsule (10 mg total) by mouth before meals and at bedtime as needed (diarrhea; abdominal cramping).  Dispense: 120 capsule; Refill: 0  - Schedule Colonoscopy    Incontinence of feces with fecal urgency   - Recommend high fiber diet to help bulk up the stool, especially soluble fiber since this can help bulk up the stool consistency and may help to slow down how fast the stool goes through the colon and can prevent diarrhea   - Start Benefiber daily   - Start: dicyclomine (BENTYL) 10 MG capsule; Take 1  capsule (10 mg total) by mouth before meals and at bedtime as needed (diarrhea; abdominal cramping).  Dispense: 120 capsule; Refill: 0   - Possible referral to general surgery and/or pelvic floor physical therapy, if symptoms persist despite use of above recommendations    Abdominal cramping  - Start: dicyclomine (BENTYL) 10 MG capsule; Take 1 capsule (10 mg total) by mouth before meals and at bedtime as needed (diarrhea; abdominal cramping).  Dispense: 120 capsule; Refill: 0  - Consider CT of abdomen and pelvis if symptoms persist    History of colon polyps & Family history of colon cancer   - Schedule Colonoscopy    If no improvement in symptoms or symptoms worsen, call/follow-up at clinic or go to ER

## 2022-03-02 ENCOUNTER — OFFICE VISIT (OUTPATIENT)
Dept: UROGYNECOLOGY | Facility: CLINIC | Age: 67
End: 2022-03-02
Payer: COMMERCIAL

## 2022-03-02 VITALS
HEIGHT: 68 IN | SYSTOLIC BLOOD PRESSURE: 144 MMHG | DIASTOLIC BLOOD PRESSURE: 88 MMHG | WEIGHT: 193.56 LBS | BODY MASS INDEX: 29.34 KG/M2

## 2022-03-02 DIAGNOSIS — N39.46 MIXED STRESS AND URGE URINARY INCONTINENCE: ICD-10-CM

## 2022-03-02 DIAGNOSIS — R15.2 RECTAL URGENCY: ICD-10-CM

## 2022-03-02 DIAGNOSIS — N95.2 VAGINAL ATROPHY: Primary | ICD-10-CM

## 2022-03-02 DIAGNOSIS — N39.42 URINARY INCONTINENCE WITHOUT SENSORY AWARENESS: ICD-10-CM

## 2022-03-02 DIAGNOSIS — R15.1 FECAL SMEARING: ICD-10-CM

## 2022-03-02 DIAGNOSIS — N81.11 CYSTOCELE, MIDLINE: ICD-10-CM

## 2022-03-02 PROCEDURE — 51701 PR INSERTION OF NON-INDWELLING BLADDER CATHETERIZATION FOR RESIDUAL UR: ICD-10-PCS | Mod: S$GLB,,, | Performed by: OBSTETRICS & GYNECOLOGY

## 2022-03-02 PROCEDURE — 3008F PR BODY MASS INDEX (BMI) DOCUMENTED: ICD-10-PCS | Mod: CPTII,S$GLB,, | Performed by: OBSTETRICS & GYNECOLOGY

## 2022-03-02 PROCEDURE — 1160F PR REVIEW ALL MEDS BY PRESCRIBER/CLIN PHARMACIST DOCUMENTED: ICD-10-PCS | Mod: CPTII,S$GLB,, | Performed by: OBSTETRICS & GYNECOLOGY

## 2022-03-02 PROCEDURE — 3079F DIAST BP 80-89 MM HG: CPT | Mod: CPTII,S$GLB,, | Performed by: OBSTETRICS & GYNECOLOGY

## 2022-03-02 PROCEDURE — 99205 PR OFFICE/OUTPT VISIT, NEW, LEVL V, 60-74 MIN: ICD-10-PCS | Mod: 25,S$GLB,, | Performed by: OBSTETRICS & GYNECOLOGY

## 2022-03-02 PROCEDURE — 1160F RVW MEDS BY RX/DR IN RCRD: CPT | Mod: CPTII,S$GLB,, | Performed by: OBSTETRICS & GYNECOLOGY

## 2022-03-02 PROCEDURE — 99205 OFFICE O/P NEW HI 60 MIN: CPT | Mod: 25,S$GLB,, | Performed by: OBSTETRICS & GYNECOLOGY

## 2022-03-02 PROCEDURE — 3077F PR MOST RECENT SYSTOLIC BLOOD PRESSURE >= 140 MM HG: ICD-10-PCS | Mod: CPTII,S$GLB,, | Performed by: OBSTETRICS & GYNECOLOGY

## 2022-03-02 PROCEDURE — 1159F MED LIST DOCD IN RCRD: CPT | Mod: CPTII,S$GLB,, | Performed by: OBSTETRICS & GYNECOLOGY

## 2022-03-02 PROCEDURE — 3077F SYST BP >= 140 MM HG: CPT | Mod: CPTII,S$GLB,, | Performed by: OBSTETRICS & GYNECOLOGY

## 2022-03-02 PROCEDURE — 51701 INSERT BLADDER CATHETER: CPT | Mod: S$GLB,,, | Performed by: OBSTETRICS & GYNECOLOGY

## 2022-03-02 PROCEDURE — 1159F PR MEDICATION LIST DOCUMENTED IN MEDICAL RECORD: ICD-10-PCS | Mod: CPTII,S$GLB,, | Performed by: OBSTETRICS & GYNECOLOGY

## 2022-03-02 PROCEDURE — 99999 PR PBB SHADOW E&M-EST. PATIENT-LVL V: ICD-10-PCS | Mod: PBBFAC,,, | Performed by: OBSTETRICS & GYNECOLOGY

## 2022-03-02 PROCEDURE — 87086 URINE CULTURE/COLONY COUNT: CPT | Performed by: OBSTETRICS & GYNECOLOGY

## 2022-03-02 PROCEDURE — 3079F PR MOST RECENT DIASTOLIC BLOOD PRESSURE 80-89 MM HG: ICD-10-PCS | Mod: CPTII,S$GLB,, | Performed by: OBSTETRICS & GYNECOLOGY

## 2022-03-02 PROCEDURE — 99999 PR PBB SHADOW E&M-EST. PATIENT-LVL V: CPT | Mod: PBBFAC,,, | Performed by: OBSTETRICS & GYNECOLOGY

## 2022-03-02 PROCEDURE — 3008F BODY MASS INDEX DOCD: CPT | Mod: CPTII,S$GLB,, | Performed by: OBSTETRICS & GYNECOLOGY

## 2022-03-02 RX ORDER — CLINDAMYCIN HYDROCHLORIDE 300 MG/1
300 CAPSULE ORAL EVERY 6 HOURS
COMMUNITY
End: 2022-05-23

## 2022-03-02 RX ORDER — ESTRADIOL 0.1 MG/G
CREAM VAGINAL
Qty: 42.5 G | Refills: 11 | Status: SHIPPED | OUTPATIENT
Start: 2022-03-02 | End: 2024-03-28

## 2022-03-02 NOTE — PROGRESS NOTES
[facility] - UROGYNECOLOGY  [address] [place] [address]    Gudelia Cerrato  [id number]  1955 March 2, 2022    Consulting Physician: Ya Agustin NP   GYN: n/a  Primary M.D.: Ya Agustin NP    Chief Complaint   Patient presents with    Urinary Incontinence       HPI:     1)  UI:  (+) KIMBERLYN < (+) UUI (when goes from sitting to standing)  X 5-10 years. Can't run too well anymore. Doesn't get much sensation of having to urinate but increases a lot suddenly when standing.   (+) pads:2/day, usually moderate wetness.  Daytime frequency: Q 1 hours.  Nocturia: Yes: 2/night. Does not limit fluids  (--) dysuria,  (--) hematuria,  (+) frequent UTIs.  (--) complete bladder emptying. Double voids occasionally    2)  POP:  Absent.  Symptoms:(--)    (--) vaginal bleeding. (--) vaginal discharge. (+) sexually active.  (--) dyspareunia.   (+)  Vaginal dryness.  (--) vaginal estrogen use.     3)  BM:  (--) constipation/straining.  (--) chronic diarrhea. (--) hematochezia.  (--) fecal incontinence.  (+) fecal smearing/urgency. Often has BM when urinating and doesn't realize this is happening.   (+) complete evacuation.  ? IBS--seeing GI in [place] (Anna Coelho NP)    Past Medical History  Past Medical History:   Diagnosis Date    Colon polyp     Depression     GERD (gastroesophageal reflux disease)     Hepatitis C 2000    Treated with Combination Interferon and told she is [facility]    Hyperlipidemia     Hypertension     Postsurgical hypothyroidism     Reflux     Syncope and collapse 2013    known recurring syncope and collapse - has had a complete workup from a Neurological specialist, from a Cardiac Specialist with Electrophysiologist and all testing completely negative; Idiopathic Syncope does run in the family    Urge incontinence of urine         Past Surgical History  Past Surgical History:   Procedure Laterality Date    breast reduction      BREAST  SURGERY  1996    Reduction    COLONOSCOPY  2013    + polyp - Repeat in 5 years    COLONOSCOPY  2017    + polyp - repeat in 5 years - Dr. Gutiérrez    EYE SURGERY  19    Right eye-Laser to lower pressure    HYSTERECTOMY      Partial - still has ovaries    JOINT REPLACEMENT  2019    Left Hip    ROTATOR CUFF REPAIR      SPINE SURGERY  13    bone spur to cervical spine removed    THYROIDECTOMY Right     Partial - right lobe removed    TONSILLECTOMY      TOTAL HIP ARTHROPLASTY      TOTAL REDUCTION MAMMOPLASTY Bilateral     age 41    UPPER GASTROINTESTINAL ENDOSCOPY        Hysterectomy: Yes - Date: .  Indication: abnormal uterine bleeding.  Dr. Bermudez at [facility] .  Type: vaginal  Cervix present: No  Ovaries present: Yes  Other procedures at time of hysterectomy:  ? Bladder lift    Past Ob History     x 2.    Largest infant weight: 8# 8 oz  unknown FAVD. yes episiotomy.      Gynecologic History  LMP: No LMP recorded. Patient has had a hysterectomy.  Age of menarche: 15  Age of menopause: 36  Menstrual history: abnormal uterine bleeding  Pap test: post hyst.  History of abnormal paps: No.  History of STIs:  No  Mammogram: Date of last: 2021.  Result: Normal  Colonoscopy:2017 internal/ external hemorrhoid, polyp-- scheduled for repeat  DEXA:  none    Family History  Family History   Problem Relation Age of Onset    Colon cancer Paternal Grandmother     Cancer Paternal Grandmother     Arthritis Paternal Grandmother     Colon cancer Maternal Grandmother     Cancer Maternal Grandmother         colon    Arthritis Maternal Grandmother     Hyperlipidemia Mother     Stroke Mother 78        stents placed for carotid blockage; multiple TIAs    Vision loss Mother     Arthritis Mother     Hypertension Father     Stroke Father 84    Hyperlipidemia Father     Vision loss Father     Arthritis Father     Parkinsonism Maternal Grandfather         passed in his 70's     Heart disease Paternal Grandfather     Asthma Sister         in her 50's    Hypertension Brother     Hypertension Sister     Arthritis Sister     Hypertension Sister     Mental illness Sister         Undiagnosed    Diabetes Maternal Aunt     Heart disease Maternal Aunt     Diabetes Maternal Aunt     Breast cancer Neg Hx     Ovarian cancer Neg Hx       Colon CA: Yes - maternal and paternal GM  Breast CA: No  GYN CA: No   CA: No    Social History  Social History     Tobacco Use   Smoking Status Never Smoker   Smokeless Tobacco Never Used   .    Social History     Substance and Sexual Activity   Alcohol Use Yes    Alcohol/week: 3.0 standard drinks    Types: 1 Glasses of wine, 1 Cans of beer, 1 Shots of liquor per week    Comment: Social drinker, amount depends on social engagements   .    Social History     Substance and Sexual Activity   Drug Use No   .  The patient is .  Resides in [place] [address].  Employment status: not working.        Allergies  Review of patient's allergies indicates:   Allergen Reactions    Percocet [oxycodone-acetaminophen] Itching    Pravastatin Other (See Comments)     myalgias       Medications  Current Outpatient Medications on File Prior to Visit   Medication Sig Dispense Refill    amlodipine-valsartan (EXFORGE) 5-320 mg per tablet Take 1 tablet by mouth once daily. 90 tablet 0    butalbital-acetaminophen-caffeine -40 mg (FIORICET, ESGIC) -40 mg per tablet Take 1 tablet by mouth every 4 (four) hours as needed.  2    calcium crb,cit/D3/min34/nancy (CITRACAL + BONE DENSITY ORAL) Citracal + Bone Density      celecoxib (CELEBREX) 200 MG capsule Take 1 capsule (200 mg total) by mouth every morning. 30 capsule 5    clindamycin (CLEOCIN) 300 MG capsule Take 300 mg by mouth every 6 (six) hours. 3 times daily      dicyclomine (BENTYL) 10 MG capsule Take 1 capsule (10 mg total) by mouth before meals and at bedtime as needed (diarrhea; abdominal cramping).  "120 capsule 0    DULoxetine (CYMBALTA) 30 MG capsule Take 2 capsules (60 mg total) by mouth once daily. 90 capsule 0    estradioL (ESTRACE) 1 MG tablet Take 1 tablet (1 mg total) by mouth once daily. 30 tablet 11    etodolac (LODINE XL) 400 MG 24 hr tablet Take 1 tablet by mouth three times daily as needed for pain 21 tablet 0    ezetimibe (ZETIA) 10 mg tablet Take 1 tablet (10 mg total) by mouth once daily. 90 tablet 1    multivitamin/iron/folic acid (CENTRUM WOMEN ORAL) Centrum      SYNTHROID 50 mcg tablet Take 1 tablet (50 mcg total) by mouth once daily. 90 tablet 1    traZODone (DESYREL) 50 MG tablet Take 1 tablet (50 mg) by mouth at bedtime as needed for insomnia.  May titrate by one half-tablet (25mg) every 2 nights up to 4 tablets (200 mg) nightly for effectiveness. (Patient taking differently: Take 1 tablet (50 mg) by mouth at bedtime as needed for insomnia.  May titrate by one half-tablet (25mg) every 2 nights up to 4 tablets (200 mg) nightly for effectiveness.) 90 tablet 1     No current facility-administered medications on file prior to visit.       Review of Systems A 14 point ROS was reviewed with pertinent positives as noted above in the history of present illness.      Constitutional: negative  Eyes: negative  Endocrine: negative  Gastrointestinal: negative  Cardiovascular: negative  Respiratory: negative  Allergic/Immunologic: negative  Integumentary: negative  Psychiatric: negative  Musculoskeletal: negative   Ear/Nose/Throat: negative  Neurologic: negative  Genitourinary: SEE HPI  Hematologic/Lymphatic: negative   Breast: negative    Urogynecologic Exam  BP (!) 144/88 (BP Location: Right arm, Patient Position: Sitting, BP Method: Small (Manual))   Ht 5' 7.7" (1.72 m)   Wt 87.8 kg (193 lb 9 oz)   BMI 29.69 kg/m²     GENERAL APPEARANCE:  The patient is well-developed, well-nourished.  Neck:  Supple with no thyromegaly, no carotid bruits.  Heart:  Regular rate and rhythm, no murmurs, rubs or " gallops.  Lungs:  Clear.  No CVA tenderness.  Abdomen:  Soft, nontender, nondistended, no hepatosplenomegaly.  Incisions:  none    PELVIC:    External genitalia:  Normal Bartholins, Skenes and labia bilaterally.    Urethra:  No caruncle, diverticulum or masses.  (+) hypermobility.    Vagina:  Atrophy (+) , no bladder masses or tender, no discharge.    Cervix:  absent  Uterus: normal size, contour, position, consistency, mobility, non-tender  Adnexa: Not palpable.    POP-Q:  Aa -1; Ba -1; C -8; Ap -1; Bp -1.  Genital hiatus 4, perineal body 2, total vaginal length 9-10.    NEUROLOGIC:  Cranial nerves 2 through 12 intact.  Strength 5/5.  DTRs 2+ lower extremities.  S2 through 4 normal.  Sacral reflexes intact.    EXT: BARNES, 2+ pulses bilaterally, no C/C/E    COUGH STRESS TEST:  negative  KEGEL: 1 /5    RECTAL:    External:  Normal, (--) hemorrhoids, (--) dovetailing.   Internal:  deferred    PVR: 10 mL    Impression    1. Vaginal atrophy    2. Urinary incontinence without sensory awareness    3. Fecal smearing    4. Mixed stress and urge urinary incontinence    5. Rectal urgency    6. Cystocele, midline        Initial Plan  The patient was counseled regarding these issues. The patient was given a summary sheet containing each of these issues with possible options for evaluation and management. When appropriate, we also reviewed computer-generated diagrams specific to their diagnoses..  All questions were addressed to the patient's satisfaction.    1)  Mixed urinary incontinence, urge > stress:    --urine C&S  --Empty bladder every 3 hours.  Empty well: wait a minute, lean forward on toilet.    --Avoid dietary irritants (see sheet).  Keep diary x 3-5 days to determine your irritants.  --start pelvic floor PT. Call to make appt:  STEPHANIE Langley (Birgit Moss or other): (p) [phone].     --URGE: consider medication in future.  Takes 2-4 weeks to see if will have effect.  For dry mouth: get sour, sugar free lozenge  or gum.    --STRESS:  Pessary vs. Sling.    --would need bladder testing 1st    2)  Fecal urgency/smearing, irritable bowel:  --was given bentyl by GI--has not tried yet  --has nervous stomach    --continue medication for stress/anxiety   --work with therapist on not internalizing stress (cognitive behavioral therapy)    --see who's covered by insurance or try:  [facility]-Primary Health care all ages; Immunizations/Vaccinations; Mental Health [address] [place] [address]  [phone]     BEHAVIORAL & MENTAL HEALTH SERVICES  [facility] - 24-hour crisis counselling and emotional support line. [phone]     VIA LINK/211 - Information, referrals & crisis line     Dial 2-1-1     National Suicide Prevention Lifeline   [phone]     [facility]-Mental Health Evaluation and [address] [place]  [address]  [phone]     [facility] - Outpatient mental health services to adults and children.[address] [place] [address]  [phone]     [facility] - Support, Education and Referral Services      [phone]     [facility] Crisis Response Program - Response to a mental health crisis at the request of first responders and follow-up after the immediate crisis has passed.         [phone]     [facility] - Provides advocacy, counseling, education and intervention for at-risk youth and their families.          [phone]     [facility] - Counseling and referrals for the family and friends of persons who have completed suicide.     [phone]    --start PT  --work on bulking stool. Start daily fiber.  Take 1 tsp of fiber powder (psyllium or other sugar-free powder).  Mix in 8 oz of water.  Take x 3-5 days.  Then, increase fiber by 1 tsp every 3-5 days until stool is easy to pass.  Stop and continue at that dose.   Do not exceed 6 tsps/day.  May also use over the counter stool  softener 1-2 x/day.  AVOID laxatives.   --benefiber tasteless; citrucel may cause less gas    3)  Vaginal atrophy (dryness):  Use 0.5 gram of estrogen cream in vagina nightly x 2 weeks, then twice a week thereafter.    --treating dryness can help with urinary urgency/frequency and reduce UTIs     4)  Stage 1-2 cystocele:  --mild, not sure related to issues  --continue to watch   --if noticing something bulging from vagina, increased bladder infections or trouble emptying, let us know    5)  RTC 3-4 months.      Approximately 60 min were spent in consult, 90 % in discussion.     Thank you for requesting consultation of your patient.  I look forward to participating in their care.    Sheila Nur  Female Pelvic Medicine and Reconstructive Surgery  [facility] [place], [place]

## 2022-03-02 NOTE — PATIENT INSTRUCTIONS
Bladder Irritants  Certain foods and drinks have been associated with worsening symptoms of urinary frequency, urgency, urge incontinence, or bladder pain. If you suffer from any of these conditions, you may wish to try eliminating one or more of these foods from your diet and see if your symptoms improve. If bladder symptoms are related to dietary factors, strict adherence to a diet thateliminates the food should bring marked relief in 10 days. Once you are feeling better, you can begin to add foods back into your diet, one at a time. If symptoms return, you will be able to identify the irritant. As you add foods back to your diet it is very important that you drink significant amounts of water.    -----------------------------------------------------------------------------------------------  List of Common Bladder Irritants*  Alcoholic beverages  Apples and apple juice  Cantaloupe  Carbonated beverages  Chili and spicy foods  Chocolate  Citrus fruit  Coffee (including decaffeinated)  Cranberries and cranberry juice  Grapes  Guava  Milk Products: milk, cheese, cottage cheese, yogurt, ice cream  Peaches  Pineapple  Plums  Strawberries  Sugar especially artificial sweeteners, saccharin, aspartame, corn sweeteners, honey, fructose, sucrose, lactose  Tea  Tomatoes and tomato juice  Vitamin B complex  Vinegar  *Most people are not sensitive to ALL of these products; your goal is to find the foods that make YOUR symptoms worse.  ---------------------------------------------------------------------------------------------------    Low-acid fruit substitutions include apricots, papaya, pears and watermelon. Coffee drinkers can drink Kava or other lowacid instant drinks. Tea drinkers can substitute non-citrus herbal and sun brewed teas. Calcium carbonate co-buffered with calcium ascorbate can be substituted for Vitamin C. Prelief is a dietary supplement that works as an acid blocker for the bladder.    Where to get more  information:        Overcoming Bladder Disorders by Araceli Bravo and Tanisha Menjivar, 1990        You Dont Have to Live with Cystitis! By Alka Hoff, 1988  [email or web address].[email or web address]  -----------------------------    Fiber Information Sheet  Your doctor has recommended that you follow a high fiber diet. The addition of fiber to your diet can make an enormous difference in your bowel control and regularity. Fiber helps people whether they lose stool or have trouble with constipation. Fiber works by bulking the stool and keeping it formed, yet making the movement soft and easy to pass. Fiber helps keep moisture within the stool so that neither diarrhea nor hard stool occurs. Fiber makes the bowels work more regularly, but it is not a laxative. An additional bonus from eating a high fiber diet is that your risk of cancer is reduced, too.    Most of us eat some high fiber foods already, but nearly all of us do not eat the necessary amount. For example, a slice of whole wheat bread contains only about 10% of the daily recommended amount of fiber. This means if you are relying on only whole wheat bread to meet the recommended fiber requirements, you would need to eat  between 10-18 slices every day! Please note that fiber is NOT in any meat or dairy product. It is only found in grains, vegetables and fruits. The recommended daily fiber intake is 20-25 grams. Foods having high fiber content include:     Fiber One Cereal, ½ cup 13.0 g   Duarte beans, ¾ cup 10.4 g   Wheat bran cereal, 1 oz 10.0 g   Kidney beans, ¾ cup 9.3 g   All Bran Cereal, ½ cup 6.0 g   Oat Bran Cereal, hot, 1 oz 4.0 g   Banana, 1medium 3.8 g   Canned pears, ½ cup 3.7 g   3 prunes or ¼ cup raisins 3.5 g   Whole Wheat Total, 1 cup 3.0 g   Carrots, ½ cup 3.2 g   Apple, small 2.8 g   Broccoli, ½ cup 2.8 g   Cauliflower, ½ cup 2.6 g   Oatmeal, 1 oz 2.5 g   Whole Wheat Toast 2.0 g   Cheerios, 1 1/3 cup 2.0 g    Baked potato with skin 2.0 g   Corn, ½ cup 1.9 g   Popcorn, 3 cups 1.9 g   Orange, medium 1.9 g   Granola bar 1.0 g   Lettuce, ½ cup 0.9 g    If you dont think that you can get enough fiber through your everyday diet, there are many good fiber supplements you can take along with eating your high fiber diet. Some of these are: Metamucil (1 heaping teaspoon or 1-2 wafers), Citrucel (1 tablespoon), Fiberall (1-2 wafers or 1 teaspoon), Perdium (2 rounded teaspoons) and 1-2 teaspoons unprocessed bran (to mix with foods)    You may need to use the fiber supplement up to 3-4 times daily to produce normal elimination. Please follow specific package directions or call us for help in regulating the dose. You may notice some bloating and/or increased gas at first. These symptoms can be relieved by adding fiber to your diet slowly. Once your body gets used to this increased fiber, these symptoms will go away.   --------------------------------------------------------------------------------------    1)  Mixed urinary incontinence, urge > stress:    --urine C&S  --Empty bladder every 3 hours.  Empty well: wait a minute, lean forward on toilet.    --Avoid dietary irritants (see sheet).  Keep diary x 3-5 days to determine your irritants.  --start pelvic floor PT. Call to make appt:  STEPHANIE Langley (Birgit Moss or other): (p) [phone].     --URGE: consider medication in future.  Takes 2-4 weeks to see if will have effect.  For dry mouth: get sour, sugar free lozenge or gum.    --STRESS:  Pessary vs. Sling.    --would need bladder testing 1st    2)  Fecal urgency/smearing, irritable bowel:  --was given bentyl by GI--has not tried yet  --has nervous stomach    --continue medication for stress/anxiety   --work with therapist on not internalizing stress (cognitive behavioral therapy)    --see who's covered by insurance or try:  [facility] [place]-Primary Health care all ages;  Immunizations/Vaccinations; Mental Health [address] [place] [address]  [phone]     BEHAVIORAL & MENTAL HEALTH SERVICES  The Phone [facility] - 24-hour crisis counselling and emotional support line. [phone]     VIA LINK/211 - Information, referrals & crisis line     Dial 2-1-1     National Suicide Prevention Lifeline   [phone]     [facility] [place]-Mental Health Evaluation and [address] [place], [place]  [id number] [phone]     [facility] - Outpatient mental health services to adults and children.[address] [place] [address]  [phone]     [facility] - Support, Education and Referral Services      [phone]     [facility] Crisis Response Program - Response to a mental health crisis at the request of first responders and follow-up after the immediate crisis has passed.         [phone]     [facility] - Provides advocacy, counseling, education and intervention for at-risk youth and their families.          [phone]     [facility] - Counseling and referrals for the family and friends of persons who have completed suicide.     [phone]    --start PT  --work on bulking stool. Start daily fiber.  Take 1 tsp of fiber powder (psyllium or other sugar-free powder).  Mix in 8 oz of water.  Take x 3-5 days.  Then, increase fiber by 1 tsp every 3-5 days until stool is easy to pass.  Stop and continue at that dose.   Do not exceed 6 tsps/day.  May also use over the counter stool softener 1-2 x/day.  AVOID laxatives.   --benefiber tasteless; citrucel may cause less gas    3)  Vaginal atrophy (dryness):  Use 0.5 gram of estrogen cream in vagina nightly x 2 weeks, then twice a week thereafter.    --treating dryness can help with urinary urgency/frequency and reduce UTIs     4)  Stage 1-2 cystocele:  --mild, not sure related to issues  --continue to watch   --if noticing something bulging from vagina, increased  bladder infections or trouble emptying, let us know    5)  RTC 3-4 months.

## 2022-03-06 DIAGNOSIS — I10 ESSENTIAL HYPERTENSION: ICD-10-CM

## 2022-03-06 LAB — BACTERIA UR CULT: NO GROWTH

## 2022-03-07 RX ORDER — AMLODIPINE AND VALSARTAN 5; 320 MG/1; MG/1
1 TABLET ORAL DAILY
Qty: 90 TABLET | Refills: 1 | Status: SHIPPED | OUTPATIENT
Start: 2022-03-07 | End: 2022-08-19 | Stop reason: SDUPTHER

## 2022-03-14 ENCOUNTER — LAB VISIT (OUTPATIENT)
Dept: LAB | Facility: HOSPITAL | Age: 67
End: 2022-03-14
Attending: NURSE PRACTITIONER
Payer: COMMERCIAL

## 2022-03-14 DIAGNOSIS — K52.9 CHRONIC DIARRHEA: ICD-10-CM

## 2022-03-14 LAB
C DIFF GDH STL QL: NEGATIVE
C DIFF TOX A+B STL QL IA: NEGATIVE
OB PNL STL: NEGATIVE
WBC #/AREA STL HPF: NORMAL /[HPF]

## 2022-03-14 PROCEDURE — 87045 FECES CULTURE AEROBIC BACT: CPT | Performed by: NURSE PRACTITIONER

## 2022-03-14 PROCEDURE — 87046 STOOL CULTR AEROBIC BACT EA: CPT | Performed by: NURSE PRACTITIONER

## 2022-03-14 PROCEDURE — 83986 ASSAY PH BODY FLUID NOS: CPT | Performed by: NURSE PRACTITIONER

## 2022-03-14 PROCEDURE — 89055 LEUKOCYTE ASSESSMENT FECAL: CPT | Performed by: NURSE PRACTITIONER

## 2022-03-14 PROCEDURE — 87427 SHIGA-LIKE TOXIN AG IA: CPT | Performed by: NURSE PRACTITIONER

## 2022-03-14 PROCEDURE — 87329 GIARDIA AG IA: CPT | Performed by: NURSE PRACTITIONER

## 2022-03-14 PROCEDURE — 87209 SMEAR COMPLEX STAIN: CPT | Performed by: NURSE PRACTITIONER

## 2022-03-14 PROCEDURE — 82272 OCCULT BLD FECES 1-3 TESTS: CPT | Performed by: NURSE PRACTITIONER

## 2022-03-14 PROCEDURE — 87177 OVA AND PARASITES SMEARS: CPT | Performed by: NURSE PRACTITIONER

## 2022-03-14 PROCEDURE — 87449 NOS EACH ORGANISM AG IA: CPT | Performed by: NURSE PRACTITIONER

## 2022-03-15 LAB
CRYPTOSP AG STL QL IA: NEGATIVE
E COLI SXT1 STL QL IA: NEGATIVE
E COLI SXT2 STL QL IA: NEGATIVE
G LAMBLIA AG STL QL IA: NEGATIVE

## 2022-03-17 DIAGNOSIS — F43.0 STRESS REACTION CAUSING MIXED DISTURBANCE OF EMOTION AND CONDUCT: ICD-10-CM

## 2022-03-17 LAB
BACTERIA STL CULT: NORMAL
O+P STL MICRO: NORMAL

## 2022-03-17 RX ORDER — DULOXETIN HYDROCHLORIDE 30 MG/1
60 CAPSULE, DELAYED RELEASE ORAL DAILY
Qty: 90 CAPSULE | Refills: 1 | Status: SHIPPED | OUTPATIENT
Start: 2022-03-17 | End: 2022-06-13 | Stop reason: SDUPTHER

## 2022-03-19 LAB — PH STL: 7 [PH] (ref 5–8.5)

## 2022-03-21 ENCOUNTER — TELEPHONE (OUTPATIENT)
Dept: GASTROENTEROLOGY | Facility: CLINIC | Age: 67
End: 2022-03-21
Payer: COMMERCIAL

## 2022-03-21 NOTE — TELEPHONE ENCOUNTER
----- Message from Anna Coelho NP sent at 3/20/2022  1:16 PM CDT -----  Let patient know her stool studies showed negative/normal results. Continue with previous recommendations including scheduled colonoscopy.

## 2022-03-22 PROBLEM — R39.15 URINARY URGENCY: Status: ACTIVE | Noted: 2022-03-22

## 2022-03-22 PROBLEM — R35.0 URINARY FREQUENCY: Status: ACTIVE | Noted: 2022-03-22

## 2022-03-22 PROBLEM — R53.1 WEAKNESS: Status: ACTIVE | Noted: 2022-03-22

## 2022-04-04 ENCOUNTER — TELEPHONE (OUTPATIENT)
Dept: GASTROENTEROLOGY | Facility: CLINIC | Age: 67
End: 2022-04-04
Payer: COMMERCIAL

## 2022-04-04 NOTE — TELEPHONE ENCOUNTER
----- Message from Susana Witt sent at 4/4/2022 11:14 AM CDT -----  .Type: Patient Call Back    Who called: self     What is the request in detail: pt is requesting for procedure to be canceled with Dr. Lovelace, wnating a call back to r/s     Can the clinic reply by [facility]?    Would the patient rather a call back or a response via [facility]? Call back     Best call back number: [phone]  Thank you

## 2022-04-04 NOTE — TELEPHONE ENCOUNTER
Spoke with pt. Pt states her  is having cervical surgery then will go to rehab. Pt will call back to reschedule her c-scope once pt has surgery & she has him situated & knows his schedule better. c-scope canceled.

## 2022-04-07 DIAGNOSIS — F43.0 STRESS REACTION CAUSING MIXED DISTURBANCE OF EMOTION AND CONDUCT: ICD-10-CM

## 2022-04-07 RX ORDER — TRAZODONE HYDROCHLORIDE 50 MG/1
TABLET ORAL
Qty: 90 TABLET | Refills: 1 | Status: SHIPPED | OUTPATIENT
Start: 2022-04-07 | End: 2022-07-20 | Stop reason: SDUPTHER

## 2022-04-25 ENCOUNTER — PATIENT OUTREACH (OUTPATIENT)
Dept: ADMINISTRATIVE | Facility: OTHER | Age: 67
End: 2022-04-25
Payer: COMMERCIAL

## 2022-04-25 ENCOUNTER — OFFICE VISIT (OUTPATIENT)
Dept: FAMILY MEDICINE | Facility: CLINIC | Age: 67
End: 2022-04-25
Payer: COMMERCIAL

## 2022-04-25 ENCOUNTER — OFFICE VISIT (OUTPATIENT)
Dept: OBSTETRICS AND GYNECOLOGY | Facility: CLINIC | Age: 67
End: 2022-04-25
Payer: COMMERCIAL

## 2022-04-25 VITALS
WEIGHT: 189.38 LBS | SYSTOLIC BLOOD PRESSURE: 120 MMHG | HEIGHT: 67 IN | BODY MASS INDEX: 29.72 KG/M2 | DIASTOLIC BLOOD PRESSURE: 86 MMHG

## 2022-04-25 VITALS
SYSTOLIC BLOOD PRESSURE: 120 MMHG | DIASTOLIC BLOOD PRESSURE: 80 MMHG | WEIGHT: 189.38 LBS | OXYGEN SATURATION: 96 % | BODY MASS INDEX: 28.7 KG/M2 | HEART RATE: 71 BPM | HEIGHT: 68 IN

## 2022-04-25 DIAGNOSIS — M17.0 PRIMARY OSTEOARTHRITIS OF BOTH KNEES: ICD-10-CM

## 2022-04-25 DIAGNOSIS — R55 SYNCOPE, UNSPECIFIED SYNCOPE TYPE: Primary | ICD-10-CM

## 2022-04-25 DIAGNOSIS — Z79.890 HORMONE REPLACEMENT THERAPY (HRT): Primary | ICD-10-CM

## 2022-04-25 DIAGNOSIS — Z79.890 HORMONE REPLACEMENT THERAPY (HRT): ICD-10-CM

## 2022-04-25 DIAGNOSIS — M21.611 BUNION OF GREAT TOE OF RIGHT FOOT: ICD-10-CM

## 2022-04-25 PROCEDURE — 99213 PR OFFICE/OUTPT VISIT, EST, LEVL III, 20-29 MIN: ICD-10-PCS | Mod: S$GLB,,, | Performed by: OBSTETRICS & GYNECOLOGY

## 2022-04-25 PROCEDURE — 99999 PR PBB SHADOW E&M-EST. PATIENT-LVL III: ICD-10-PCS | Mod: PBBFAC,,, | Performed by: OBSTETRICS & GYNECOLOGY

## 2022-04-25 PROCEDURE — 3074F PR MOST RECENT SYSTOLIC BLOOD PRESSURE < 130 MM HG: ICD-10-PCS | Mod: CPTII,S$GLB,, | Performed by: OBSTETRICS & GYNECOLOGY

## 2022-04-25 PROCEDURE — 3074F SYST BP LT 130 MM HG: CPT | Mod: CPTII,S$GLB,, | Performed by: OBSTETRICS & GYNECOLOGY

## 2022-04-25 PROCEDURE — 99214 OFFICE O/P EST MOD 30 MIN: CPT | Mod: S$GLB,,, | Performed by: INTERNAL MEDICINE

## 2022-04-25 PROCEDURE — 3079F DIAST BP 80-89 MM HG: CPT | Mod: CPTII,S$GLB,, | Performed by: INTERNAL MEDICINE

## 2022-04-25 PROCEDURE — 99999 PR PBB SHADOW E&M-EST. PATIENT-LVL V: ICD-10-PCS | Mod: PBBFAC,,, | Performed by: INTERNAL MEDICINE

## 2022-04-25 PROCEDURE — 1160F PR REVIEW ALL MEDS BY PRESCRIBER/CLIN PHARMACIST DOCUMENTED: ICD-10-PCS | Mod: CPTII,S$GLB,, | Performed by: INTERNAL MEDICINE

## 2022-04-25 PROCEDURE — 1159F MED LIST DOCD IN RCRD: CPT | Mod: CPTII,S$GLB,, | Performed by: INTERNAL MEDICINE

## 2022-04-25 PROCEDURE — 4010F PR ACE/ARB THEARPY RXD/TAKEN: ICD-10-PCS | Mod: CPTII,S$GLB,, | Performed by: INTERNAL MEDICINE

## 2022-04-25 PROCEDURE — 3079F PR MOST RECENT DIASTOLIC BLOOD PRESSURE 80-89 MM HG: ICD-10-PCS | Mod: CPTII,S$GLB,, | Performed by: INTERNAL MEDICINE

## 2022-04-25 PROCEDURE — 1160F RVW MEDS BY RX/DR IN RCRD: CPT | Mod: CPTII,S$GLB,, | Performed by: INTERNAL MEDICINE

## 2022-04-25 PROCEDURE — 3079F PR MOST RECENT DIASTOLIC BLOOD PRESSURE 80-89 MM HG: ICD-10-PCS | Mod: CPTII,S$GLB,, | Performed by: OBSTETRICS & GYNECOLOGY

## 2022-04-25 PROCEDURE — 99213 OFFICE O/P EST LOW 20 MIN: CPT | Mod: S$GLB,,, | Performed by: OBSTETRICS & GYNECOLOGY

## 2022-04-25 PROCEDURE — 3074F SYST BP LT 130 MM HG: CPT | Mod: CPTII,S$GLB,, | Performed by: INTERNAL MEDICINE

## 2022-04-25 PROCEDURE — 3008F BODY MASS INDEX DOCD: CPT | Mod: CPTII,S$GLB,, | Performed by: OBSTETRICS & GYNECOLOGY

## 2022-04-25 PROCEDURE — 3008F BODY MASS INDEX DOCD: CPT | Mod: CPTII,S$GLB,, | Performed by: INTERNAL MEDICINE

## 2022-04-25 PROCEDURE — 99999 PR PBB SHADOW E&M-EST. PATIENT-LVL III: CPT | Mod: PBBFAC,,, | Performed by: OBSTETRICS & GYNECOLOGY

## 2022-04-25 PROCEDURE — 3008F PR BODY MASS INDEX (BMI) DOCUMENTED: ICD-10-PCS | Mod: CPTII,S$GLB,, | Performed by: INTERNAL MEDICINE

## 2022-04-25 PROCEDURE — 1159F PR MEDICATION LIST DOCUMENTED IN MEDICAL RECORD: ICD-10-PCS | Mod: CPTII,S$GLB,, | Performed by: INTERNAL MEDICINE

## 2022-04-25 PROCEDURE — 4010F PR ACE/ARB THEARPY RXD/TAKEN: ICD-10-PCS | Mod: CPTII,S$GLB,, | Performed by: OBSTETRICS & GYNECOLOGY

## 2022-04-25 PROCEDURE — 3008F PR BODY MASS INDEX (BMI) DOCUMENTED: ICD-10-PCS | Mod: CPTII,S$GLB,, | Performed by: OBSTETRICS & GYNECOLOGY

## 2022-04-25 PROCEDURE — 4010F ACE/ARB THERAPY RXD/TAKEN: CPT | Mod: CPTII,S$GLB,, | Performed by: INTERNAL MEDICINE

## 2022-04-25 PROCEDURE — 99999 PR PBB SHADOW E&M-EST. PATIENT-LVL V: CPT | Mod: PBBFAC,,, | Performed by: INTERNAL MEDICINE

## 2022-04-25 PROCEDURE — 3074F PR MOST RECENT SYSTOLIC BLOOD PRESSURE < 130 MM HG: ICD-10-PCS | Mod: CPTII,S$GLB,, | Performed by: INTERNAL MEDICINE

## 2022-04-25 PROCEDURE — 1159F PR MEDICATION LIST DOCUMENTED IN MEDICAL RECORD: ICD-10-PCS | Mod: CPTII,S$GLB,, | Performed by: OBSTETRICS & GYNECOLOGY

## 2022-04-25 PROCEDURE — 3079F DIAST BP 80-89 MM HG: CPT | Mod: CPTII,S$GLB,, | Performed by: OBSTETRICS & GYNECOLOGY

## 2022-04-25 PROCEDURE — 4010F ACE/ARB THERAPY RXD/TAKEN: CPT | Mod: CPTII,S$GLB,, | Performed by: OBSTETRICS & GYNECOLOGY

## 2022-04-25 PROCEDURE — 99214 PR OFFICE/OUTPT VISIT, EST, LEVL IV, 30-39 MIN: ICD-10-PCS | Mod: S$GLB,,, | Performed by: INTERNAL MEDICINE

## 2022-04-25 PROCEDURE — 1159F MED LIST DOCD IN RCRD: CPT | Mod: CPTII,S$GLB,, | Performed by: OBSTETRICS & GYNECOLOGY

## 2022-04-25 RX ORDER — GABAPENTIN 300 MG/1
300 CAPSULE ORAL 3 TIMES DAILY
Qty: 90 CAPSULE | Refills: 11 | Status: SHIPPED | OUTPATIENT
Start: 2022-04-25 | End: 2023-11-13

## 2022-04-25 RX ORDER — CELECOXIB 200 MG/1
200 CAPSULE ORAL EVERY MORNING
Qty: 90 CAPSULE | Refills: 1 | Status: SHIPPED | OUTPATIENT
Start: 2022-04-25 | End: 2022-08-19 | Stop reason: SDUPTHER

## 2022-04-25 NOTE — PROGRESS NOTES
Patient ID: Gudelia Cerrato is a 66 y.o. female.    Chief Complaint: verigo  and Arthritis      Assessment and Plan      Syncope- she wants to hold off on referral to cards at this time. Follow up in 1 month and may refer at that time.   See orders.     1. Syncope, unspecified syncope type     2. Primary osteoarthritis of both knees  celecoxib (CELEBREX) 200 MG capsule   3. Hormone replacement therapy (HRT)  Ambulatory referral/consult to Obstetrics / Gynecology   4. Bunion of great toe of right foot  Ambulatory referral/consult to Podiatry      HPI     Here for an acute visit    Previous healthcare on the [place] by RADHA Agustin.  Chronic medical problems include hypertension, hyperlipidemia, recurrent major depression, mixed stress and urge incontinence, HRT, postsurgical hypothyroidism, history of hepatitis-C, arthritis of knees, history of recurrent idiopathic syncope, previous workups have been negative, has family history of syncope.    Passed out out last Thursday morning. She fell backwards, thinks hit door frame, then hit back of head on floor. Was out for a while per . She was trying to go the bathroom. Day before she was working out in the yard and could have been dehydrated. In the past 6 yrs she has passed out 5 times. One event she broke nose and had to have cervical spine surgery. Neurological and cardiovascular tests negative she reports. She does have room spinning dizziness at times since fall. history of BPPV.      Ortho- Dr. Steel, chronic knee pain.   urogyn- Dr. Nur, needs referral to new gyn  Bunions and toe curling- would like podiatry.   Bilateral hand pain. Would like refill on celebrex.      Review of Systems   Constitutional: Negative for fever.   Respiratory: Negative for shortness of breath.    Cardiovascular: Negative for chest pain.   Gastrointestinal: Negative for abdominal pain.   Genitourinary: Positive for urgency.   Musculoskeletal: Positive for arthralgias.         Vitals:    04/25/22 0949   BP: 120/80   Pulse: 71     Physical Exam  Cardiovascular:      Rate and Rhythm: Normal rate and regular rhythm.      Pulses:           Carotid pulses are 2+ on the right side and 2+ on the left side.     Heart sounds: No murmur heard.    No gallop.   Pulmonary:      Breath sounds: Normal breath sounds. No wheezing or rhonchi.   Abdominal:      General: Bowel sounds are normal.      Palpations: Abdomen is soft.      Tenderness: There is no abdominal tenderness.   Musculoskeletal:         General: Normal range of motion.      Cervical back: Neck supple.   Skin:     General: Skin is warm.      Findings: No rash.   Neurological:      Mental Status: She is alert.   Psychiatric:         Behavior: Behavior normal.         I personally reviewed past medical, family and social history.  Medication List with Changes/Refills   Current Medications    AMLODIPINE-VALSARTAN (EXFORGE) 5-320 MG PER TABLET    Take 1 tablet by mouth once daily.    BUTALBITAL-ACETAMINOPHEN-CAFFEINE -40 MG (FIORICET, ESGIC) -40 MG PER TABLET    Take 1 tablet by mouth every 4 (four) hours as needed.    CALCIUM CRB,CIT/D3/MIN34/NELLY (CITRACAL + BONE DENSITY ORAL)    Citracal + Bone Density    CLINDAMYCIN (CLEOCIN) 300 MG CAPSULE    Take 300 mg by mouth every 6 (six) hours. 3 times daily    DULOXETINE (CYMBALTA) 30 MG CAPSULE    Take 2 capsules (60 mg total) by mouth once daily.    ESTRADIOL (ESTRACE) 0.01 % (0.1 MG/GRAM) VAGINAL CREAM    0.5 grams with applicator or dime-sized amount with finger in vagina nightly x 2 weeks, then twice a week thereafter    ESTRADIOL (ESTRACE) 1 MG TABLET    Take 1 tablet (1 mg total) by mouth once daily.    ETODOLAC (LODINE XL) 400 MG 24 HR TABLET    Take 1 tablet by mouth three times daily as needed for pain    EZETIMIBE (ZETIA) 10 MG TABLET    Take 1 tablet (10 mg total) by mouth once daily.    LATANOPROST 0.005 % OPHTHALMIC SOLUTION    Apply 1 drop into both eyes at bedtime     MULTIVITAMIN/IRON/FOLIC ACID (CENTRUM WOMEN ORAL)    Centrum    SYNTHROID 50 MCG TABLET    Take 1 tablet (50 mcg total) by mouth once daily.    TRAZODONE (DESYREL) 50 MG TABLET    Take 1 tablet (50 mg) by mouth at bedtime as needed for insomnia.  May titrate by one half-tablet (25mg) every 2 nights up to 4 tablets (200 mg) nightly for effectiveness.   Changed and/or Refilled Medications    Modified Medication Previous Medication    CELECOXIB (CELEBREX) 200 MG CAPSULE celecoxib (CELEBREX) 200 MG capsule       Take 1 capsule (200 mg total) by mouth every morning.    Take 1 capsule (200 mg total) by mouth every morning.

## 2022-04-25 NOTE — PROGRESS NOTES
Chief Complaint   Patient presents with    [facility]    Hormone therapy       History and Physical:  Gudelia Cerrato is a 66 y.o. F who presents today for to discuss menopausal symptoms and HRT.     C/o vaginal atrophy and urinary incontinence       No LMP recorded. Patient has had a hysterectomy. 30s due AUB  Years since menopause onset? Unsure, soon after Hyst  Started HRT in her 40s, hot flashes, sleep distrubance  Estradiol 1mg daily and  Started vaginal estrogen twice a week this month  Never stopped or decreased the dose    ROS:   Hot flashes    HRT Risk Factors:  Prior CAD, MI, stroke/TIA, PE/VTE? no  Thrombophilia or APS? no  Breast & Endometrial Cancer? no  Estrogen dependent neoplasm? no  Liver disease? Hepatisis, states she no longer has the virus, no liver issue currently  Undiagnosed abnormal genital bleeding? no  HTN, DM, HLD, Gallbladder disease, Obesity, Migraines w/ aura, Tobacco Abuse? HTN (meds), HLD (meds), Migraines with aura  Family history of heart disease? no  Family history of breast cancer or BRCA? no    CVD Risk Score 6.7%, avoid oral estrogen, transdermal candidate    Allergies:   Review of patient's allergies indicates:   Allergen Reactions    Percocet [oxycodone-acetaminophen] Itching    Pravastatin Other (See Comments)     myalgias     Past Medical History:   Diagnosis Date    Colon polyp     Depression     GERD (gastroesophageal reflux disease)     Hepatitis C 2000    Treated with Combination Interferon and told she is [facility]    Hyperlipidemia     Hypertension     Postsurgical hypothyroidism     Reflux     Syncope and collapse 2013    known recurring syncope and collapse - has had a complete workup from a Neurological specialist, from a Cardiac Specialist with Electrophysiologist and all testing completely negative; Idiopathic Syncope does run in the family    Urge incontinence of urine      Past Surgical History:   Procedure Laterality  Date    breast reduction      BREAST SURGERY  8/1996    Reduction    COLONOSCOPY  11/6/2013    + polyp - Repeat in 5 years    COLONOSCOPY  02/13/2017    + polyp - repeat in 5 years - Dr. Gutiérrez    EYE SURGERY  4/4/19    Right eye-Laser to lower pressure    HYSTERECTOMY      Partial - still has ovaries    JOINT REPLACEMENT  8/13/2019    Left Hip    ROTATOR CUFF REPAIR      SPINE SURGERY  8/22/13    bone spur to cervical spine removed    THYROIDECTOMY Right     Partial - right lobe removed    TONSILLECTOMY      TOTAL HIP ARTHROPLASTY      TOTAL REDUCTION MAMMOPLASTY Bilateral     age 41    UPPER GASTROINTESTINAL ENDOSCOPY       MEDS:   Current Outpatient Medications on File Prior to Visit   Medication Sig Dispense Refill    amlodipine-valsartan (EXFORGE) 5-320 mg per tablet Take 1 tablet by mouth once daily. 90 tablet 1    butalbital-acetaminophen-caffeine -40 mg (FIORICET, ESGIC) -40 mg per tablet Take 1 tablet by mouth every 4 (four) hours as needed.  2    calcium crb,cit/D3/min34/nancy (CITRACAL + BONE DENSITY ORAL) Citracal + Bone Density      celecoxib (CELEBREX) 200 MG capsule Take 1 capsule (200 mg total) by mouth every morning. 90 capsule 1    DULoxetine (CYMBALTA) 30 MG capsule Take 2 capsules (60 mg total) by mouth once daily. 90 capsule 1    estradioL (ESTRACE) 0.01 % (0.1 mg/gram) vaginal cream 0.5 grams with applicator or dime-sized amount with finger in vagina nightly x 2 weeks, then twice a week thereafter 42.5 g 11    estradioL (ESTRACE) 1 MG tablet Take 1 tablet (1 mg total) by mouth once daily. 30 tablet 11    etodolac (LODINE XL) 400 MG 24 hr tablet Take 1 tablet by mouth three times daily as needed for pain 21 tablet 0    ezetimibe (ZETIA) 10 mg tablet Take 1 tablet (10 mg total) by mouth once daily. 90 tablet 1    latanoprost 0.005 % ophthalmic solution Apply 1 drop into both eyes at bedtime 7.5 mL 3    multivitamin/iron/folic acid (CENTRUM WOMEN ORAL) Centrum       SYNTHROID 50 mcg tablet Take 1 tablet (50 mcg total) by mouth once daily. 90 tablet 1    traZODone (DESYREL) 50 MG tablet Take 1 tablet (50 mg) by mouth at bedtime as needed for insomnia.  May titrate by one half-tablet (25mg) every 2 nights up to 4 tablets (200 mg) nightly for effectiveness. 90 tablet 1    clindamycin (CLEOCIN) 300 MG capsule Take 300 mg by mouth every 6 (six) hours. 3 times daily      [DISCONTINUED] celecoxib (CELEBREX) 200 MG capsule Take 1 capsule (200 mg total) by mouth every morning. 30 capsule 5     No current facility-administered medications on file prior to visit.     OB History        2    Para   2    Term   2            AB        Living   2       SAB        IAB        Ectopic        Multiple        Live Births                   Social History     Socioeconomic History    Marital status:    Tobacco Use    Smoking status: Never Smoker    Smokeless tobacco: Never Used   Substance and Sexual Activity    Alcohol use: Yes     Alcohol/week: 3.0 standard drinks     Types: 1 Glasses of wine, 1 Cans of beer, 1 Shots of liquor per week     Comment: Social drinker, amount depends on social engagements    Drug use: No    Sexual activity: Yes     Partners: Male     Birth control/protection: Post-menopausal     Social Determinants of Health     Financial Resource Strain: Medium Risk    Difficulty of Paying Living Expenses: Somewhat hard   Food Insecurity: No Food Insecurity    Worried About Running Out of Food in the Last Year: Never true    Ran Out of Food in the Last Year: Never true   Transportation Needs: No Transportation Needs    Lack of Transportation (Medical): No    Lack of Transportation (Non-Medical): No   Physical Activity: Insufficiently Active    Days of Exercise per Week: 2 days    Minutes of Exercise per Session: 60 min   Stress: Stress Concern Present    Feeling of Stress : To some extent   Social Connections: Unknown    Frequency of  "Communication with Friends and Family: More than three times a week    Frequency of Social Gatherings with Friends and Family: More than three times a week    Active Member of Clubs or Organizations: Yes    Attends Club or Organization Meetings: More than 4 times per year    Marital Status:    Housing Stability: Low Risk     Unable to Pay for Housing in the Last Year: No    Number of Places Lived in the Last Year: 1    Unstable Housing in the Last Year: No     Family History   Problem Relation Age of Onset    Colon cancer Paternal Grandmother     Cancer Paternal Grandmother     Arthritis Paternal Grandmother     Colon cancer Maternal Grandmother     Cancer Maternal Grandmother         colon    Arthritis Maternal Grandmother     Hyperlipidemia Mother     Stroke Mother 78        stents placed for carotid blockage; multiple TIAs    Vision loss Mother     Arthritis Mother     Hypertension Father     Stroke Father 84    Hyperlipidemia Father     Vision loss Father     Arthritis Father     Parkinsonism Maternal Grandfather         passed in his 70's    Heart disease Paternal Grandfather     Asthma Sister         in her 50's    Hypertension Brother     Hypertension Sister     Arthritis Sister     Hypertension Sister     Mental illness Sister         Undiagnosed    Diabetes Maternal Aunt     Heart disease Maternal Aunt     Diabetes Maternal Aunt     Breast cancer Neg Hx     Ovarian cancer Neg Hx        Physical Exam:   /86   Ht 5' 7" (1.702 m)   Wt 85.9 kg (189 lb 6 oz)   BMI 29.66 kg/m²   Constitutional: She appears alert and responsive. She appears well-developed, well-groomed, and well-nourished. No distress.  Abdominal: Soft. She exhibits no distension, hernias or masses. There is no tenderness. No enlargement of liver edge or spleen.  There is no rebound and no guarding.   Genitourinary: Deferred. Pap not indicated and no GYN complaints. Offered patient exam, patient " declined exam.     Assessment/Plan:   Hormone replacement therapy (HRT)  -     Ambulatory referral/consult to Obstetrics / Gynecology  -     gabapentin (NEURONTIN) 300 MG capsule; Take 1 capsule (300 mg total) by mouth 3 (three) times daily.  Dispense: 90 capsule; Refill: 11      HRT:   Lifestyles solutions such as layering clothing, maintaining lower ambient temperature, and consuming cool drinks are reasonable measure for management.   Risk, benefits and alternatives to hormone replacement discussed. Start lowest dose for the shortest duration to relieve menopausal symptoms.  Estrogen therapy is associated with small risk in stroke but decreased risk in hip fracture.  Safer alternative may be available to treat the symptoms of menopause:   Paxil 7.5mg daily, adverse effects nausea, dizziness, constipation, and sexual dysfunction  Gabapentin 600-900mg daily, adverse effects dizziness, peripheral edema, & somnolence  Clonidine .1mg qHS, adverse effects dry mouth, insomnia, & drowsiness  Patient decided to refrain from estrogen therapy.   She will try Gabapentin.     Vaginal Atrophy:   Recommended vaginal lubricants & moisturizers.   Discussed Vaginal estrogen: Pill, Cream, or Ring. Will continue vaginal estrogen.    Follow up in 3 months

## 2022-05-02 ENCOUNTER — PATIENT MESSAGE (OUTPATIENT)
Dept: PODIATRY | Facility: CLINIC | Age: 67
End: 2022-05-02
Payer: COMMERCIAL

## 2022-05-09 ENCOUNTER — PATIENT MESSAGE (OUTPATIENT)
Dept: SMOKING CESSATION | Facility: CLINIC | Age: 67
End: 2022-05-09
Payer: COMMERCIAL

## 2022-05-10 ENCOUNTER — HOSPITAL ENCOUNTER (OUTPATIENT)
Dept: RADIOLOGY | Facility: HOSPITAL | Age: 67
Discharge: HOME | End: 2022-05-10
Attending: PODIATRIST
Payer: COMMERCIAL

## 2022-05-10 ENCOUNTER — OFFICE VISIT (OUTPATIENT)
Dept: PODIATRY | Facility: CLINIC | Age: 67
End: 2022-05-10
Payer: COMMERCIAL

## 2022-05-10 DIAGNOSIS — M21.611 BUNION OF GREAT TOE OF RIGHT FOOT: ICD-10-CM

## 2022-05-10 DIAGNOSIS — M21.611 BUNION OF GREAT TOE OF RIGHT FOOT: Primary | ICD-10-CM

## 2022-05-10 DIAGNOSIS — M20.41 HAMMER TOE OF RIGHT FOOT: ICD-10-CM

## 2022-05-10 PROCEDURE — 1159F PR MEDICATION LIST DOCUMENTED IN MEDICAL RECORD: ICD-10-PCS | Mod: CPTII,S$GLB,, | Performed by: PODIATRIST

## 2022-05-10 PROCEDURE — 99203 PR OFFICE/OUTPT VISIT, NEW, LEVL III, 30-44 MIN: ICD-10-PCS | Mod: S$GLB,,, | Performed by: PODIATRIST

## 2022-05-10 PROCEDURE — 99999 PR PBB SHADOW E&M-EST. PATIENT-LVL III: ICD-10-PCS | Mod: PBBFAC,,, | Performed by: PODIATRIST

## 2022-05-10 PROCEDURE — 99203 OFFICE O/P NEW LOW 30 MIN: CPT | Mod: S$GLB,,, | Performed by: PODIATRIST

## 2022-05-10 PROCEDURE — 1160F PR REVIEW ALL MEDS BY PRESCRIBER/CLIN PHARMACIST DOCUMENTED: ICD-10-PCS | Mod: CPTII,S$GLB,, | Performed by: PODIATRIST

## 2022-05-10 PROCEDURE — 73630 XR FOOT COMPLETE 3 VIEW BILATERAL: ICD-10-PCS | Mod: 26,,, | Performed by: RADIOLOGY

## 2022-05-10 PROCEDURE — 99999 PR PBB SHADOW E&M-EST. PATIENT-LVL III: CPT | Mod: PBBFAC,,, | Performed by: PODIATRIST

## 2022-05-10 PROCEDURE — 4010F ACE/ARB THERAPY RXD/TAKEN: CPT | Mod: CPTII,S$GLB,, | Performed by: PODIATRIST

## 2022-05-10 PROCEDURE — 1160F RVW MEDS BY RX/DR IN RCRD: CPT | Mod: CPTII,S$GLB,, | Performed by: PODIATRIST

## 2022-05-10 PROCEDURE — 73630 X-RAY EXAM OF FOOT: CPT | Mod: 26,,, | Performed by: RADIOLOGY

## 2022-05-10 PROCEDURE — 1159F MED LIST DOCD IN RCRD: CPT | Mod: CPTII,S$GLB,, | Performed by: PODIATRIST

## 2022-05-10 PROCEDURE — 4010F PR ACE/ARB THEARPY RXD/TAKEN: ICD-10-PCS | Mod: CPTII,S$GLB,, | Performed by: PODIATRIST

## 2022-05-10 PROCEDURE — 73630 X-RAY EXAM OF FOOT: CPT | Mod: TC,50,PO

## 2022-05-10 NOTE — PROGRESS NOTES
Subjective:      Patient ID: Gudelia Cerrato is a 66 y.o. female.    Chief Complaint: Bunions (Right foot )    Gudelia is a 66 y.o. female who presents to the podiatry clinic  with complaint of  bilateral foot pain with bunions right worse than left. Onset of the symptoms was several years ago. Precipitating event: none known. Current symptoms include: ability to bear weight, but with some pain, worsening symptoms after a period of activity and pain gets severe by the end of the day. Aggravating factors: any weight bearing and closed shoes. Symptoms have gradually worsened. Patient has had prior foot problems. Evaluation to date: none. Treatment to date: Wears wider shoes, tried spacers etc. Patients rates pain 0/10 on pain scale but gets severe by the end of the day.        Review of Systems   Constitutional: Negative for chills and fever.   Cardiovascular: Negative for claudication and leg swelling.   Respiratory: Negative for shortness of breath.    Skin: Negative for itching, nail changes and rash.   Musculoskeletal: Negative for muscle cramps, muscle weakness and myalgias.        Bunions bilateral   Gastrointestinal: Negative for nausea and vomiting.   Neurological: Negative for focal weakness, loss of balance, numbness and paresthesias.           Objective:      Physical Exam  Constitutional:       General: She is not in acute distress.     Appearance: She is well-developed. She is not diaphoretic.   Cardiovascular:      Pulses:           Dorsalis pedis pulses are 2+ on the right side and 2+ on the left side.        Posterior tibial pulses are 2+ on the right side and 2+ on the left side.      Comments: < 3 sec capillary refill time to toes 1-5 bilateral. Toes and feet are warm to touch proximally with normal distal cooling b/l. There is some hair growth on the feet and toes b/l. There is no edema b/l. No spider veins or varicosities present b/l.     Musculoskeletal:      Comments: Equinus noted b/l ankles  with < 10 deg DF noted. MMT 5/5 in DF/PF/Inv/Ev resistance with no reproduction of pain in any direction. Passive range of motion of ankle and pedal joints is painless b/l.    Painful medial 1st MTPJ exostosis right worse than left. Lateral deviation of hallux, non trackbound. No pain w/ ROM to 1st or 2nd MTPJs. There is some First ray hypermobility bilateral without sub second MT head callus. Right second toe there is some subluxation at the MTPJ with dorsal and medial deviation       Skin:     General: Skin is warm and dry.      Coloration: Skin is not pale.      Findings: No abrasion, bruising, burn, ecchymosis, erythema, laceration, lesion, petechiae or rash.      Nails: There is no clubbing.      Comments: Skin temperature, texture and turgor within normal limits.   Neurological:      Mental Status: She is alert and oriented to person, place, and time.      Sensory: No sensory deficit.      Motor: No tremor, atrophy or abnormal muscle tone.      Comments: Negative tinel sign bilateral.   Psychiatric:         Behavior: Behavior normal.               Assessment:       Encounter Diagnoses   Name Primary?    Bunion of great toe of right foot Yes    Hammer toe of right foot          Plan:       Gudelia was seen today for bunions.    Diagnoses and all orders for this visit:    Bunion of great toe of right foot  -     Ambulatory referral/consult to Podiatry  -     X-Ray Foot Complete Bilateral; Future    Hammer toe of right foot      I counseled the patient on her conditions, their implications and medical management.    Discussed importance of supportive shoes with accommodative toe box to reduce pressure and irritation to forefoot.     Discussed wearing toe spacers and pads as needed    She would like surgical correction but her  is getting over neck surgery right now    Discussed lapiplasty lapidus procedure for the bunion and second hammertoe procedure likely weil osteotomy when she is ready will return to  discuss in depth and set up date    Obi Loaiza DPM

## 2022-05-23 ENCOUNTER — OFFICE VISIT (OUTPATIENT)
Dept: FAMILY MEDICINE | Facility: CLINIC | Age: 67
End: 2022-05-23
Payer: COMMERCIAL

## 2022-05-23 VITALS
BODY MASS INDEX: 30.07 KG/M2 | DIASTOLIC BLOOD PRESSURE: 80 MMHG | OXYGEN SATURATION: 95 % | HEIGHT: 67 IN | SYSTOLIC BLOOD PRESSURE: 120 MMHG | HEART RATE: 82 BPM | WEIGHT: 191.56 LBS

## 2022-05-23 DIAGNOSIS — Z00.00 ANNUAL PHYSICAL EXAM: Primary | ICD-10-CM

## 2022-05-23 DIAGNOSIS — E89.0 POSTSURGICAL HYPOTHYROIDISM: ICD-10-CM

## 2022-05-23 DIAGNOSIS — Z82.49 FAMILY HISTORY OF EARLY CAD: ICD-10-CM

## 2022-05-23 DIAGNOSIS — R06.00 DYSPNEA, UNSPECIFIED TYPE: ICD-10-CM

## 2022-05-23 DIAGNOSIS — Z78.9 STATIN INTOLERANCE: ICD-10-CM

## 2022-05-23 DIAGNOSIS — I10 PRIMARY HYPERTENSION: ICD-10-CM

## 2022-05-23 DIAGNOSIS — E78.2 MIXED HYPERLIPIDEMIA: ICD-10-CM

## 2022-05-23 DIAGNOSIS — Z13.1 SCREENING FOR DIABETES MELLITUS: ICD-10-CM

## 2022-05-23 PROCEDURE — 3074F PR MOST RECENT SYSTOLIC BLOOD PRESSURE < 130 MM HG: ICD-10-PCS | Mod: CPTII,S$GLB,, | Performed by: INTERNAL MEDICINE

## 2022-05-23 PROCEDURE — 99999 PR PBB SHADOW E&M-EST. PATIENT-LVL V: ICD-10-PCS | Mod: PBBFAC,,, | Performed by: INTERNAL MEDICINE

## 2022-05-23 PROCEDURE — 93010 EKG 12-LEAD: ICD-10-PCS | Mod: S$GLB,,, | Performed by: INTERNAL MEDICINE

## 2022-05-23 PROCEDURE — 1159F MED LIST DOCD IN RCRD: CPT | Mod: CPTII,S$GLB,, | Performed by: INTERNAL MEDICINE

## 2022-05-23 PROCEDURE — 3079F PR MOST RECENT DIASTOLIC BLOOD PRESSURE 80-89 MM HG: ICD-10-PCS | Mod: CPTII,S$GLB,, | Performed by: INTERNAL MEDICINE

## 2022-05-23 PROCEDURE — 99396 PREV VISIT EST AGE 40-64: CPT | Mod: S$GLB,,, | Performed by: INTERNAL MEDICINE

## 2022-05-23 PROCEDURE — 93005 EKG 12-LEAD: ICD-10-PCS | Mod: S$GLB,,, | Performed by: INTERNAL MEDICINE

## 2022-05-23 PROCEDURE — 3008F BODY MASS INDEX DOCD: CPT | Mod: CPTII,S$GLB,, | Performed by: INTERNAL MEDICINE

## 2022-05-23 PROCEDURE — 99999 PR PBB SHADOW E&M-EST. PATIENT-LVL V: CPT | Mod: PBBFAC,,, | Performed by: INTERNAL MEDICINE

## 2022-05-23 PROCEDURE — 93005 ELECTROCARDIOGRAM TRACING: CPT | Mod: S$GLB,,, | Performed by: INTERNAL MEDICINE

## 2022-05-23 PROCEDURE — 4010F ACE/ARB THERAPY RXD/TAKEN: CPT | Mod: CPTII,S$GLB,, | Performed by: INTERNAL MEDICINE

## 2022-05-23 PROCEDURE — 3079F DIAST BP 80-89 MM HG: CPT | Mod: CPTII,S$GLB,, | Performed by: INTERNAL MEDICINE

## 2022-05-23 PROCEDURE — 1160F PR REVIEW ALL MEDS BY PRESCRIBER/CLIN PHARMACIST DOCUMENTED: ICD-10-PCS | Mod: CPTII,S$GLB,, | Performed by: INTERNAL MEDICINE

## 2022-05-23 PROCEDURE — 3074F SYST BP LT 130 MM HG: CPT | Mod: CPTII,S$GLB,, | Performed by: INTERNAL MEDICINE

## 2022-05-23 PROCEDURE — 1160F RVW MEDS BY RX/DR IN RCRD: CPT | Mod: CPTII,S$GLB,, | Performed by: INTERNAL MEDICINE

## 2022-05-23 PROCEDURE — 3008F PR BODY MASS INDEX (BMI) DOCUMENTED: ICD-10-PCS | Mod: CPTII,S$GLB,, | Performed by: INTERNAL MEDICINE

## 2022-05-23 PROCEDURE — 99396 PR PREVENTIVE VISIT,EST,40-64: ICD-10-PCS | Mod: S$GLB,,, | Performed by: INTERNAL MEDICINE

## 2022-05-23 PROCEDURE — 1159F PR MEDICATION LIST DOCUMENTED IN MEDICAL RECORD: ICD-10-PCS | Mod: CPTII,S$GLB,, | Performed by: INTERNAL MEDICINE

## 2022-05-23 PROCEDURE — 93010 ELECTROCARDIOGRAM REPORT: CPT | Mod: S$GLB,,, | Performed by: INTERNAL MEDICINE

## 2022-05-23 PROCEDURE — 4010F PR ACE/ARB THEARPY RXD/TAKEN: ICD-10-PCS | Mod: CPTII,S$GLB,, | Performed by: INTERNAL MEDICINE

## 2022-05-23 RX ORDER — EVOLOCUMAB 140 MG/ML
140 INJECTION, SOLUTION SUBCUTANEOUS
Qty: 2 ML | Refills: 0 | OUTPATIENT
Start: 2022-05-23 | End: 2022-07-21

## 2022-05-23 NOTE — PROGRESS NOTES
Patient ID: Gudelia Cerrato is a 66 y.o. female.    Chief Complaint: m+1       Assessment and Plan      Trial Repatha  Cardiology referral for worsening dyspnea with exertion.  EKG normal sinus rhythm  Doing well otherwise  See orders    1. Annual physical exam     2. Statin intolerance  evolocumab (REPATHA SURECLICK) 140 mg/mL PnIj   3. Mixed hyperlipidemia  evolocumab (REPATHA SURECLICK) 140 mg/mL PnIj    Comprehensive Metabolic Panel    Lipid Panel   4. Family history of early CAD  evolocumab (REPATHA SURECLICK) 140 mg/mL PnIj    Ambulatory referral/consult to Cardiology    IN OFFICE EKG 12-LEAD (to Muse)   5. Primary hypertension  Comprehensive Metabolic Panel   6. Postsurgical hypothyroidism  TSH   7. Screening for diabetes mellitus  Hemoglobin A1C   8. Dyspnea, unspecified type  Ambulatory referral/consult to Cardiology    IN OFFICE EKG 12-LEAD (to Muse)      HPI     Chronic medical problems include hypertension, hyperlipidemia, recurrent major depression, mixed stress and urge incontinence, HRT, postsurgical hypothyroidism, history of hepatitis-C, arthritis of knees, history of recurrent idiopathic syncope, previous workups have been negative, has family history of syncope, statin intolerance.     Annual   Diet- needs improvement   Exercise- minimal   Alcohol- occasional   Tobacco- none    Saw gynecology.  She decided to forego estrogen therapy and try gabapentin.  celebrex and gabapentin helping with joint pain   Saw Podiatry- discussed surgery for bunion and hammertoe  Due for colonoscopy, last attempt canceled  Occasional dyspnea, mostly with stairs 1 flight.     Hypertension- does not check at home, controlled in clinic.   hyperlipidemia- statin intolerance, taking zetia. LDL not a goal. Discussed repatha.   Depression- cymbalta 30 mg daily, stable  Hypothyroid- controlled       Review of Systems   Constitutional: Negative for fever.   Respiratory: Negative for shortness of breath.    Cardiovascular:  Negative for chest pain.   Gastrointestinal: Negative for abdominal pain.        Vitals:    05/23/22 1308   BP: 120/80   Pulse: 82     Physical Exam  Cardiovascular:      Rate and Rhythm: Normal rate and regular rhythm.      Heart sounds: No murmur heard.    No gallop.   Pulmonary:      Breath sounds: Normal breath sounds. No wheezing or rhonchi.   Abdominal:      General: Bowel sounds are normal.      Palpations: Abdomen is soft.      Tenderness: There is no abdominal tenderness.   Musculoskeletal:         General: Normal range of motion.      Cervical back: Neck supple.   Skin:     General: Skin is warm.      Findings: No rash.   Neurological:      Mental Status: She is alert.   Psychiatric:         Behavior: Behavior normal.         I personally reviewed past medical, family and social history.  Medication List with Changes/Refills   New Medications    EVOLOCUMAB (REPATHA SURECLICK) 140 MG/ML PNIJ    Inject 1 mL (140 mg total) into the skin every 14 (fourteen) days.   Current Medications    AMLODIPINE-VALSARTAN (EXFORGE) 5-320 MG PER TABLET    Take 1 tablet by mouth once daily.    BUTALBITAL-ACETAMINOPHEN-CAFFEINE -40 MG (FIORICET, ESGIC) -40 MG PER TABLET    Take 1 tablet by mouth every 4 (four) hours as needed.    CALCIUM CRB,CIT/D3/MIN34/NELLY (CITRACAL + BONE DENSITY ORAL)    Citracal + Bone Density    CELECOXIB (CELEBREX) 200 MG CAPSULE    Take 1 capsule (200 mg total) by mouth every morning.    DULOXETINE (CYMBALTA) 30 MG CAPSULE    Take 2 capsules (60 mg total) by mouth once daily.    ESTRADIOL (ESTRACE) 0.01 % (0.1 MG/GRAM) VAGINAL CREAM    0.5 grams with applicator or dime-sized amount with finger in vagina nightly x 2 weeks, then twice a week thereafter    EZETIMIBE (ZETIA) 10 MG TABLET    Take 1 tablet (10 mg total) by mouth once daily.    GABAPENTIN (NEURONTIN) 300 MG CAPSULE    Take 1 capsule (300 mg total) by mouth 3 (three) times daily.    LATANOPROST 0.005 % OPHTHALMIC SOLUTION     Apply 1 drop into both eyes at bedtime    LATANOPROST 0.005 % OPHTHALMIC SOLUTION    Apply 1 drop into both eyes at bedtime    MULTIVITAMIN/IRON/FOLIC ACID (CENTRUM WOMEN ORAL)    Centrum    SYNTHROID 50 MCG TABLET    Take 1 tablet (50 mcg total) by mouth once daily.    TRAZODONE (DESYREL) 50 MG TABLET    Take 1 tablet (50 mg) by mouth at bedtime as needed for insomnia.  May titrate by one half-tablet (25mg) every 2 nights up to 4 tablets (200 mg) nightly for effectiveness.   Discontinued Medications    CLINDAMYCIN (CLEOCIN) 300 MG CAPSULE    Take 300 mg by mouth every 6 (six) hours. 3 times daily    ETODOLAC (LODINE XL) 400 MG 24 HR TABLET    Take 1 tablet by mouth three times daily as needed for pain

## 2022-05-26 ENCOUNTER — TELEPHONE (OUTPATIENT)
Dept: GASTROENTEROLOGY | Facility: CLINIC | Age: 67
End: 2022-05-26
Payer: COMMERCIAL

## 2022-05-26 NOTE — TELEPHONE ENCOUNTER
Spoke with pt. Rescheduled c-scope. Pt verbalized understanding to date. Pt states she still has prep instructions.

## 2022-05-26 NOTE — TELEPHONE ENCOUNTER
----- Message from Janice Baxter sent at 5/26/2022 10:59 AM CDT -----  Contact: pt at  [phone]  Type: Needs Medical Advice  Who Called:pt  Best Call Back Number: [phone]  Additional Information: pt is calling the office to have her colonoscopy rescheduled. The pt would like to speak with the nurse asap today. Please call back and advise.

## 2022-06-04 DIAGNOSIS — E78.2 MIXED HYPERLIPIDEMIA: ICD-10-CM

## 2022-06-04 DIAGNOSIS — E89.0 POSTSURGICAL HYPOTHYROIDISM: ICD-10-CM

## 2022-06-06 RX ORDER — LEVOTHYROXINE SODIUM 50 UG/1
50 TABLET ORAL DAILY
Qty: 90 TABLET | Refills: 3 | Status: SHIPPED | OUTPATIENT
Start: 2022-06-06 | End: 2023-06-04 | Stop reason: SDUPTHER

## 2022-06-06 RX ORDER — EZETIMIBE 10 MG/1
10 TABLET ORAL DAILY
Qty: 90 TABLET | Refills: 3 | Status: SHIPPED | OUTPATIENT
Start: 2022-06-06 | End: 2023-06-06 | Stop reason: SDUPTHER

## 2022-06-13 DIAGNOSIS — F43.0 STRESS REACTION CAUSING MIXED DISTURBANCE OF EMOTION AND CONDUCT: ICD-10-CM

## 2022-06-13 RX ORDER — DULOXETIN HYDROCHLORIDE 30 MG/1
30 CAPSULE, DELAYED RELEASE ORAL DAILY
Qty: 90 CAPSULE | Refills: 1 | Status: SHIPPED | OUTPATIENT
Start: 2022-06-13 | End: 2023-04-17 | Stop reason: SDUPTHER

## 2022-06-15 ENCOUNTER — SPECIALTY PHARMACY (OUTPATIENT)
Dept: PHARMACY | Facility: CLINIC | Age: 67
End: 2022-06-15

## 2022-06-15 NOTE — TELEPHONE ENCOUNTER
Order received for Repatha. PA required. CMM Key: [[id number]]. Will continue to follow up.     Anna this is Josephine Garcia with [facility].  We are working on your prescription that your doctor has sent us. We will be working with your insurance to get this approved for you. We will be calling you along the way with updates on your medication.  If you have any questions, you can reach us at [phone].    Welcome call outcome: Left voicemail

## 2022-06-16 NOTE — TELEPHONE ENCOUNTER
PA denied due to patient having high cholesterol of NON-familial variety and not having ASCVD. Will contact [facility] about next step.     Outgoing call to patient informing her of denial. She states she does not think she should be starting this medication yet anyway and will be getting new insurance next month. Will not go forward with appeal since PA will need to be done on new insurance.

## 2022-06-17 ENCOUNTER — OFFICE VISIT (OUTPATIENT)
Dept: UROGYNECOLOGY | Facility: CLINIC | Age: 67
End: 2022-06-17
Payer: COMMERCIAL

## 2022-06-17 VITALS
WEIGHT: 191.56 LBS | SYSTOLIC BLOOD PRESSURE: 140 MMHG | HEIGHT: 68 IN | DIASTOLIC BLOOD PRESSURE: 80 MMHG | BODY MASS INDEX: 29.03 KG/M2

## 2022-06-17 DIAGNOSIS — N95.2 VAGINAL ATROPHY: ICD-10-CM

## 2022-06-17 DIAGNOSIS — N81.11 CYSTOCELE, MIDLINE: ICD-10-CM

## 2022-06-17 DIAGNOSIS — N39.46 MIXED STRESS AND URGE URINARY INCONTINENCE: Primary | ICD-10-CM

## 2022-06-17 DIAGNOSIS — R15.1 FECAL SMEARING: ICD-10-CM

## 2022-06-17 PROCEDURE — 1160F RVW MEDS BY RX/DR IN RCRD: CPT | Mod: CPTII,S$GLB,, | Performed by: NURSE PRACTITIONER

## 2022-06-17 PROCEDURE — 1160F PR REVIEW ALL MEDS BY PRESCRIBER/CLIN PHARMACIST DOCUMENTED: ICD-10-PCS | Mod: CPTII,S$GLB,, | Performed by: NURSE PRACTITIONER

## 2022-06-17 PROCEDURE — 99999 PR PBB SHADOW E&M-EST. PATIENT-LVL IV: ICD-10-PCS | Mod: PBBFAC,,, | Performed by: NURSE PRACTITIONER

## 2022-06-17 PROCEDURE — 4010F ACE/ARB THERAPY RXD/TAKEN: CPT | Mod: CPTII,S$GLB,, | Performed by: NURSE PRACTITIONER

## 2022-06-17 PROCEDURE — 4010F PR ACE/ARB THEARPY RXD/TAKEN: ICD-10-PCS | Mod: CPTII,S$GLB,, | Performed by: NURSE PRACTITIONER

## 2022-06-17 PROCEDURE — 3079F PR MOST RECENT DIASTOLIC BLOOD PRESSURE 80-89 MM HG: ICD-10-PCS | Mod: CPTII,S$GLB,, | Performed by: NURSE PRACTITIONER

## 2022-06-17 PROCEDURE — 3008F BODY MASS INDEX DOCD: CPT | Mod: CPTII,S$GLB,, | Performed by: NURSE PRACTITIONER

## 2022-06-17 PROCEDURE — 3077F PR MOST RECENT SYSTOLIC BLOOD PRESSURE >= 140 MM HG: ICD-10-PCS | Mod: CPTII,S$GLB,, | Performed by: NURSE PRACTITIONER

## 2022-06-17 PROCEDURE — 3077F SYST BP >= 140 MM HG: CPT | Mod: CPTII,S$GLB,, | Performed by: NURSE PRACTITIONER

## 2022-06-17 PROCEDURE — 99213 PR OFFICE/OUTPT VISIT, EST, LEVL III, 20-29 MIN: ICD-10-PCS | Mod: S$GLB,,, | Performed by: NURSE PRACTITIONER

## 2022-06-17 PROCEDURE — 99213 OFFICE O/P EST LOW 20 MIN: CPT | Mod: S$GLB,,, | Performed by: NURSE PRACTITIONER

## 2022-06-17 PROCEDURE — 99999 PR PBB SHADOW E&M-EST. PATIENT-LVL IV: CPT | Mod: PBBFAC,,, | Performed by: NURSE PRACTITIONER

## 2022-06-17 PROCEDURE — 3079F DIAST BP 80-89 MM HG: CPT | Mod: CPTII,S$GLB,, | Performed by: NURSE PRACTITIONER

## 2022-06-17 PROCEDURE — 1159F MED LIST DOCD IN RCRD: CPT | Mod: CPTII,S$GLB,, | Performed by: NURSE PRACTITIONER

## 2022-06-17 PROCEDURE — 1159F PR MEDICATION LIST DOCUMENTED IN MEDICAL RECORD: ICD-10-PCS | Mod: CPTII,S$GLB,, | Performed by: NURSE PRACTITIONER

## 2022-06-17 PROCEDURE — 3008F PR BODY MASS INDEX (BMI) DOCUMENTED: ICD-10-PCS | Mod: CPTII,S$GLB,, | Performed by: NURSE PRACTITIONER

## 2022-06-17 NOTE — PROGRESS NOTES
Urogyn follow up  06/17/2022  .  [facility] - UROGYNECOLOGY  [address] [place] [address]    Gudelia Cerrato  [id number]  1955      Gudelia Cerrato is a 66 y.o.  here for a urogyn follow up of mixed urinary incontinence.    Last HPI from 03/02/2022     1)  UI:  (+) KIMBERLYN < (+) UUI (when goes from sitting to standing)  X 5-10 years. Can't run too well anymore. Doesn't get much sensation of having to urinate but increases a lot suddenly when standing.   (+) pads:2/day, usually moderate wetness.  Daytime frequency: Q 1 hours.  Nocturia: Yes: 2/night. Does not limit fluids  (--) dysuria,  (--) hematuria,  (+) frequent UTIs.  (--) complete bladder emptying. Double voids occasionally     2)  POP:  Absent.  Symptoms:(--)    (--) vaginal bleeding. (--) vaginal discharge. (+) sexually active.  (--) dyspareunia.   (+)  Vaginal dryness.  (--) vaginal estrogen use.      3)  BM:  (--) constipation/straining.  (--) chronic diarrhea. (--) hematochezia.  (--) fecal incontinence.  (+) fecal smearing/urgency. Often has BM when urinating and doesn't realize this is happening.   (+) complete evacuation.  ? IBS--seeing GI in [place] (Anna Coelho NP)    Changes from last visit:     1)  Mixed urinary incontinence, urge > stress:    --going to pelvic floor PT with Melany Moss  --UI improving.   --voiding every hour during the day  --nocturia 0-2/ night  --1 pad/ day-- moslty dry      2)  Fecal urgency/smearing, irritable bowel:  --improved with citrucel  --has not started seeing a therapist     3)  Vaginal atrophy (dryness):    --using vaginal estrogen cream twice weekly  --has stopped estrogen tablets      4)  Stage 1-2 cystocele:  --mild, not sure related to issues  .      Past Medical History:   Diagnosis Date    Colon polyp     Depression     GERD (gastroesophageal reflux disease)     Hepatitis C 2000    Treated with Combination Interferon and told she is [facility]     Hyperlipidemia     Hypertension     Postsurgical hypothyroidism     Reflux     Syncope and collapse 2013    known recurring syncope and collapse - has had a complete workup from a Neurological specialist, from a Cardiac Specialist with Electrophysiologist and all testing completely negative; Idiopathic Syncope does run in the family    Urge incontinence of urine        Past Surgical History:   Procedure Laterality Date    breast reduction      BREAST SURGERY  8/1996    Reduction    COLONOSCOPY  11/6/2013    + polyp - Repeat in 5 years    COLONOSCOPY  02/13/2017    + polyp - repeat in 5 years - Dr. Gutiérrez    COLONOSCOPY N/A 6/2/2022    Procedure: COLONOSCOPY;  Surgeon: Dany Lovelace MD;  Location: [facility];  Service: Endoscopy;  Laterality: N/A;    EYE SURGERY  4/4/19    Right eye-Laser to lower pressure    HYSTERECTOMY      Partial - still has ovaries    JOINT REPLACEMENT  8/13/2019    Left Hip    ROTATOR CUFF REPAIR      SPINE SURGERY  8/22/13    bone spur to cervical spine removed    THYROIDECTOMY Right     Partial - right lobe removed    TONSILLECTOMY      TOTAL HIP ARTHROPLASTY      TOTAL REDUCTION MAMMOPLASTY Bilateral     age 41    UPPER GASTROINTESTINAL ENDOSCOPY         Family History   Problem Relation Age of Onset    Colon cancer Paternal Grandmother     Cancer Paternal Grandmother     Arthritis Paternal Grandmother     Colon cancer Maternal Grandmother     Cancer Maternal Grandmother         colon    Arthritis Maternal Grandmother     Hyperlipidemia Mother     Stroke Mother 78        stents placed for carotid blockage; multiple TIAs    Vision loss Mother     Arthritis Mother     Hypertension Father     Stroke Father 84    Hyperlipidemia Father     Vision loss Father     Arthritis Father     Parkinsonism Maternal Grandfather         passed in his 70's    Heart disease Paternal Grandfather     Asthma Sister         in her 50's    Hypertension Brother      Hypertension Sister     Arthritis Sister     Hypertension Sister     Mental illness Sister         Undiagnosed    Diabetes Maternal Aunt     Heart disease Maternal Aunt     Diabetes Maternal Aunt     Breast cancer Neg Hx     Ovarian cancer Neg Hx        Social History     Socioeconomic History    Marital status:    Tobacco Use    Smoking status: Never Smoker    Smokeless tobacco: Never Used   Substance and Sexual Activity    Alcohol use: Yes     Alcohol/week: 3.0 standard drinks     Types: 1 Glasses of wine, 1 Cans of beer, 1 Shots of liquor per week     Comment: Social drinker, amount depends on social engagements    Drug use: No    Sexual activity: Yes     Partners: Male     Birth control/protection: Post-menopausal     Social Determinants of Health     Financial Resource Strain: Medium Risk    Difficulty of Paying Living Expenses: Somewhat hard   Food Insecurity: No Food Insecurity    Worried About Running Out of Food in the Last Year: Never true    Ran Out of Food in the Last Year: Never true   Transportation Needs: No Transportation Needs    Lack of Transportation (Medical): No    Lack of Transportation (Non-Medical): No   Physical Activity: Insufficiently Active    Days of Exercise per Week: 2 days    Minutes of Exercise per Session: 60 min   Stress: Stress Concern Present    Feeling of Stress : To some extent   Social Connections: Unknown    Frequency of Communication with Friends and Family: More than three times a week    Frequency of Social Gatherings with Friends and Family: More than three times a week    Active Member of Clubs or Organizations: Yes    Attends Club or Organization Meetings: More than 4 times per year    Marital Status:    Housing Stability: Low Risk     Unable to Pay for Housing in the Last Year: No    Number of Places Lived in the Last Year: 1    Unstable Housing in the Last Year: No       Current Outpatient Medications   Medication Sig  Dispense Refill    amlodipine-valsartan (EXFORGE) 5-320 mg per tablet Take 1 tablet by mouth once daily. 90 tablet 1    butalbital-acetaminophen-caffeine -40 mg (FIORICET, ESGIC) -40 mg per tablet Take 1 tablet by mouth every 4 (four) hours as needed.  2    calcium crb,cit/D3/min34/nancy (CITRACAL + BONE DENSITY ORAL) Citracal + Bone Density      celecoxib (CELEBREX) 200 MG capsule Take 1 capsule (200 mg total) by mouth every morning. 90 capsule 1    DULoxetine (CYMBALTA) 30 MG capsule Take 1 capsule (30 mg total) by mouth once daily. 90 capsule 1    estradioL (ESTRACE) 0.01 % (0.1 mg/gram) vaginal cream 0.5 grams with applicator or dime-sized amount with finger in vagina nightly x 2 weeks, then twice a week thereafter 42.5 g 11    etodolac (LODINE) 400 MG tablet Take 1 tablet (400 mg total) by mouth 3 (three) times daily as needed for pain. 21 tablet 1    ezetimibe (ZETIA) 10 mg tablet Take 1 tablet (10 mg total) by mouth once daily. 90 tablet 3    gabapentin (NEURONTIN) 300 MG capsule Take 1 capsule (300 mg total) by mouth 3 (three) times daily. 90 capsule 11    multivitamin/iron/folic acid (CENTRUM WOMEN ORAL) Centrum      SYNTHROID 50 mcg tablet Take 1 tablet (50 mcg total) by mouth once daily. 90 tablet 3    traZODone (DESYREL) 50 MG tablet Take 1 tablet (50 mg) by mouth at bedtime as needed for insomnia.  May titrate by one half-tablet (25mg) every 2 nights up to 4 tablets (200 mg) nightly for effectiveness. 90 tablet 1    latanoprost 0.005 % ophthalmic solution Apply 1 drop into both eyes at bedtime (Patient not taking: No sig reported) 7.5 mL 3    latanoprost 0.005 % ophthalmic solution Apply 1 drop into both eyes at bedtime (Patient not taking: No sig reported) 7.5 mL 3     No current facility-administered medications for this visit.       Review of patient's allergies indicates:   Allergen Reactions    Percocet [oxycodone-acetaminophen] Itching    Pravastatin Other (See  "Comments)     myalgias       Well woman:  Pap test: post hyst.  History of abnormal paps: No.  History of STIs:  No  Mammogram: Date of last: 12/2021.  Result: Normal  Colonoscopy:02/2017 internal/ external hemorrhoid, polyp-- scheduled for repeat  DEXA:  none    ROS:  As per HPI.      Exam  BP (!) 140/80 (BP Location: Right arm, Patient Position: Sitting, BP Method: Medium (Manual))   Ht 5' 8" (1.727 m)   Wt 86.9 kg (191 lb 9.3 oz)   BMI 29.13 kg/m²   General: alert and oriented, no acute distress  Respiratory: normal respiratory effort  Abd: soft, non-tender, non-distended    Pelvic--deferred      Impression  1. Mixed stress and urge urinary incontinence     2. Vaginal atrophy     3. Cystocele, midline     4. Fecal smearing       We reviewed the above issues and discussed options for short-term versus long-term management of her problems.   Plan:      1)  Mixed urinary incontinence, urge > stress:    --Empty bladder every 3 hours.  Empty well: wait a minute, lean forward on toilet.    --Avoid dietary irritants (see sheet).  Keep diary x 3-5 days to determine your irritants.  --continue pelvic floor PT. Call to make appt:  [facility]PH[facility] (Birgit Moss or other): (p) [phone].    --URGE: consider medication in future.  Takes 2-4 weeks to see if will have effect.  For dry mouth: get sour, sugar free lozenge or gum.    --STRESS:  Pessary vs. Sling.               --would need bladder testing 1st     2)  Fecal urgency/smearing, irritable bowel:  --continue citrucel  --did not try bentyl  --has nervous stomach                         --continue medication for stress/anxiety              --work with therapist on not internalizing stress (cognitive behavioral therapy)                          --see who's covered by insurance or try:  [facility] - [place]-Primary Health care all ages; Immunizations/Vaccinations; Mental Health [address] [place] [id number]  [phone]     BEHAVIORAL & " MENTAL HEALTH SERVICES  [facility] - 24-hour crisis counselling and emotional support line. [phone]     VIA LINK/211 - Information, referrals & crisis line     Dial 2-1-1     National Suicide Prevention Lifeline   691.478.7557     [facility] - [place]-Mental Health Evaluation and [address] [place], [place]  [address]  [phone]     [facility] - Outpatient mental health services to adults and children.[address], [place] [address] [phone]     [facility] - Support, Education and Referral Services      [phone]     [facility] Crisis [facility] - Response to a mental health crisis at the request of first responders and follow-up after the immediate crisis has passed.         [phone]     [facility] - Provides advocacy, counseling, education and intervention for at-risk youth and their families.          [phone]     [facility] - Counseling and referrals for the family and friends of persons who have completed suicide.     [phone]       3)  Vaginal atrophy (dryness):  Use 0.5 gram of estrogen cream in vagina nightly x 2 weeks, then twice a week thereafter.    --treating dryness can help with urinary urgency/frequency and reduce UTIs      4)  Stage 1-2 cystocele:  --mild, not sure related to issues  --continue to watch              --if noticing something bulging from vagina, increased bladder infections or trouble emptying, let us know     5)  RTC 6 months    I spent a total of 20 minutes on the day of the visit.  This includes face to face time and non-face to face time preparing to see the patient (eg, review of tests), obtaining and/or reviewing separately obtained history, documenting clinical information in the electronic or other health record, independently interpreting results and communicating results to the patient/family/caregiver, or care coordinator.    Janneth Lyn, LIOR-BC [facility]  Division of Female Pelvic Medicine and Reconstructive Surgery  Department of Obstetrics & Gynecology

## 2022-06-17 NOTE — PATIENT INSTRUCTIONS
1)  Mixed urinary incontinence, urge > stress:    --Empty bladder every 3 hours.  Empty well: wait a minute, lean forward on toilet.    --Avoid dietary irritants (see sheet).  Keep diary x 3-5 days to determine your irritants.  --continue pelvic floor PT. Call to make appt:  STEPHANIE Langley (Birgit Moss or other): (p) [phone].    --URGE: consider medication in future.  Takes 2-4 weeks to see if will have effect.  For dry mouth: get sour, sugar free lozenge or gum.    --STRESS:  Pessary vs. Sling.               --would need bladder testing 1st   --medical clearance from primary care   --could consider anterior/ posterior repair, midurethral sling   --[email or web address]     2)  Fecal urgency/smearing, irritable bowel:  --continue citrucel  --did not try bentyl  --has nervous stomach                         --continue medication for stress/anxiety              --work with therapist on not internalizing stress (cognitive behavioral therapy)                          --see who's covered by insurance or try:  [facility]-Primary Health care all ages; Immunizations/Vaccinations; Mental Health [address] [place] [address]  [phone]     BEHAVIORAL & MENTAL HEALTH SERVICES  [facility] - 24-hour crisis counselling and emotional support line. [phone]     VIA LINK/211 - Information, referrals & crisis line     Dial 2-1-1     National Suicide Prevention Lifeline   [phone]     [facility]-Mental Health Evaluation and [address] [place]  [address]  [phone]     [facility] - Outpatient mental health services to adults and children.[address] [place], [place] [address] [phone]     [facility] Education and Referral [facility]      [phone]     [facility] Crisis Response Program - Response to a mental health crisis at the request of first responders and  follow-up after the immediate crisis has passed.         [phone]     [facility] - Provides advocacy, counseling, education and intervention for at-risk youth and their families.          [phone]     [facility] - Counseling and referrals for the family and friends of persons who have completed suicide.     [phone]       3)  Vaginal atrophy (dryness):  Use 0.5 gram of estrogen cream in vagina nightly x 2 weeks, then twice a week thereafter.    --treating dryness can help with urinary urgency/frequency and reduce UTIs      4)  Stage 1-2 cystocele:  --mild, not sure related to issues  --continue to watch              --if noticing something bulging from vagina, increased bladder infections or trouble emptying, let us know     5)  RTC 6 months

## 2022-07-06 NOTE — TELEPHONE ENCOUNTER
Incoming call from patient about Repatha. She mentioned she is on Medicare now but wants to wait for upcoming appointment for doctor to reassess new crestor prescription and decide whether to do a PA for Repatha on new insurance. She mentioned she has no income and will not be able to pay out of pocket for anything.

## 2022-07-20 DIAGNOSIS — F43.0 STRESS REACTION CAUSING MIXED DISTURBANCE OF EMOTION AND CONDUCT: ICD-10-CM

## 2022-07-20 RX ORDER — TRAZODONE HYDROCHLORIDE 50 MG/1
50 TABLET ORAL NIGHTLY
Qty: 90 TABLET | Refills: 1 | Status: SHIPPED | OUTPATIENT
Start: 2022-07-20 | End: 2023-07-07 | Stop reason: SDUPTHER

## 2022-07-21 ENCOUNTER — OFFICE VISIT (OUTPATIENT)
Dept: CARDIOLOGY | Facility: CLINIC | Age: 67
End: 2022-07-21
Payer: MEDICARE

## 2022-07-21 VITALS
DIASTOLIC BLOOD PRESSURE: 86 MMHG | HEART RATE: 75 BPM | HEIGHT: 68 IN | BODY MASS INDEX: 28.97 KG/M2 | WEIGHT: 191.13 LBS | SYSTOLIC BLOOD PRESSURE: 135 MMHG

## 2022-07-21 DIAGNOSIS — I10 PRIMARY HYPERTENSION: Primary | ICD-10-CM

## 2022-07-21 DIAGNOSIS — R07.89 ATYPICAL CHEST PAIN: ICD-10-CM

## 2022-07-21 DIAGNOSIS — E78.2 MIXED HYPERLIPIDEMIA: ICD-10-CM

## 2022-07-21 DIAGNOSIS — R06.00 DYSPNEA, UNSPECIFIED TYPE: ICD-10-CM

## 2022-07-21 DIAGNOSIS — Z01.30 ENCOUNTER FOR EXAMINATION OF BLOOD PRESSURE WITHOUT ABNORMAL FINDINGS: ICD-10-CM

## 2022-07-21 PROBLEM — Z82.49 FAMILY HISTORY OF EARLY CAD: Status: RESOLVED | Noted: 2022-05-23 | Resolved: 2022-07-21

## 2022-07-21 PROCEDURE — 1101F PR PT FALLS ASSESS DOC 0-1 FALLS W/OUT INJ PAST YR: ICD-10-PCS | Mod: CPTII,S$GLB,, | Performed by: INTERNAL MEDICINE

## 2022-07-21 PROCEDURE — 1159F PR MEDICATION LIST DOCUMENTED IN MEDICAL RECORD: ICD-10-PCS | Mod: CPTII,S$GLB,, | Performed by: INTERNAL MEDICINE

## 2022-07-21 PROCEDURE — 1101F PT FALLS ASSESS-DOCD LE1/YR: CPT | Mod: CPTII,S$GLB,, | Performed by: INTERNAL MEDICINE

## 2022-07-21 PROCEDURE — 3008F BODY MASS INDEX DOCD: CPT | Mod: CPTII,S$GLB,, | Performed by: INTERNAL MEDICINE

## 2022-07-21 PROCEDURE — 99999 PR PBB SHADOW E&M-EST. PATIENT-LVL III: CPT | Mod: PBBFAC,,, | Performed by: INTERNAL MEDICINE

## 2022-07-21 PROCEDURE — 4010F ACE/ARB THERAPY RXD/TAKEN: CPT | Mod: CPTII,S$GLB,, | Performed by: INTERNAL MEDICINE

## 2022-07-21 PROCEDURE — 3288F FALL RISK ASSESSMENT DOCD: CPT | Mod: CPTII,S$GLB,, | Performed by: INTERNAL MEDICINE

## 2022-07-21 PROCEDURE — 99999 PR PBB SHADOW E&M-EST. PATIENT-LVL III: ICD-10-PCS | Mod: PBBFAC,,, | Performed by: INTERNAL MEDICINE

## 2022-07-21 PROCEDURE — 3079F DIAST BP 80-89 MM HG: CPT | Mod: CPTII,S$GLB,, | Performed by: INTERNAL MEDICINE

## 2022-07-21 PROCEDURE — 3079F PR MOST RECENT DIASTOLIC BLOOD PRESSURE 80-89 MM HG: ICD-10-PCS | Mod: CPTII,S$GLB,, | Performed by: INTERNAL MEDICINE

## 2022-07-21 PROCEDURE — 1160F PR REVIEW ALL MEDS BY PRESCRIBER/CLIN PHARMACIST DOCUMENTED: ICD-10-PCS | Mod: CPTII,S$GLB,, | Performed by: INTERNAL MEDICINE

## 2022-07-21 PROCEDURE — 1159F MED LIST DOCD IN RCRD: CPT | Mod: CPTII,S$GLB,, | Performed by: INTERNAL MEDICINE

## 2022-07-21 PROCEDURE — 4010F PR ACE/ARB THEARPY RXD/TAKEN: ICD-10-PCS | Mod: CPTII,S$GLB,, | Performed by: INTERNAL MEDICINE

## 2022-07-21 PROCEDURE — 3075F PR MOST RECENT SYSTOLIC BLOOD PRESS GE 130-139MM HG: ICD-10-PCS | Mod: CPTII,S$GLB,, | Performed by: INTERNAL MEDICINE

## 2022-07-21 PROCEDURE — 1160F RVW MEDS BY RX/DR IN RCRD: CPT | Mod: CPTII,S$GLB,, | Performed by: INTERNAL MEDICINE

## 2022-07-21 PROCEDURE — 99204 PR OFFICE/OUTPT VISIT, NEW, LEVL IV, 45-59 MIN: ICD-10-PCS | Mod: S$GLB,,, | Performed by: INTERNAL MEDICINE

## 2022-07-21 PROCEDURE — 3288F PR FALLS RISK ASSESSMENT DOCUMENTED: ICD-10-PCS | Mod: CPTII,S$GLB,, | Performed by: INTERNAL MEDICINE

## 2022-07-21 PROCEDURE — 3008F PR BODY MASS INDEX (BMI) DOCUMENTED: ICD-10-PCS | Mod: CPTII,S$GLB,, | Performed by: INTERNAL MEDICINE

## 2022-07-21 PROCEDURE — 99204 OFFICE O/P NEW MOD 45 MIN: CPT | Mod: S$GLB,,, | Performed by: INTERNAL MEDICINE

## 2022-07-21 PROCEDURE — 3075F SYST BP GE 130 - 139MM HG: CPT | Mod: CPTII,S$GLB,, | Performed by: INTERNAL MEDICINE

## 2022-07-21 NOTE — PROGRESS NOTES
Subjective:    Patient ID:  Gudelia Cerrato is a 67 y.o. female who presents for evaluation of No chief complaint on file.      Problem List Items Addressed This Visit        Cardiac/Vascular    Family history of early CAD    Mixed hyperlipidemia    Primary hypertension - Primary      Other Visit Diagnoses     Dyspnea, unspecified type              HPI    Referred by Dr. Norwood    The patient states that she has been having issues of fainting around the time of GI illness or when she is taking Abx. States that it is starting to happen with more regularity as she gets older.     Had an episode 4-6 weeks ago.    No chest pain.  Some shortness of breath with exertion.  Not exercising    Intolerance of statins - Gets muscle cramps  Insurance not covering Repatha per her report, so not taking at this time, only taking Zetia    Personal history of heart attack or stroke - None that she is aware of  Family history of heart disease - Grandfather  at 66 with MI, Dad with HTN    Past Medical History:   Diagnosis Date    Colon polyp     Depression     GERD (gastroesophageal reflux disease)     Hepatitis C     Treated with Combination Interferon and told she is Cured by [facility] Brice Correa [facility]    Hyperlipidemia     Hypertension     Postsurgical hypothyroidism     Reflux     Syncope and collapse     known recurring syncope and collapse - has had a complete workup from a Neurological specialist, from a Cardiac Specialist with Electrophysiologist and all testing completely negative; Idiopathic Syncope does run in the family    Urge incontinence of urine        Past Surgical History:   Procedure Laterality Date    breast reduction      BREAST SURGERY  1996    Reduction    COLONOSCOPY  2013    + polyp - Repeat in 5 years    COLONOSCOPY  2017    + polyp - repeat in 5 years - Dr. Gutiérrez    COLONOSCOPY N/A 2022    Procedure: COLONOSCOPY;  Surgeon: Dany Lovelace MD;  Location: [facility]  ENDO;  Service: Endoscopy;  Laterality: N/A;    EYE SURGERY  4/4/19    Right eye-Laser to lower pressure    HYSTERECTOMY      Partial - still has ovaries    JOINT REPLACEMENT  8/13/2019    Left Hip    ROTATOR CUFF REPAIR      SPINE SURGERY  8/22/13    bone spur to cervical spine removed    THYROIDECTOMY Right     Partial - right lobe removed    TONSILLECTOMY      TOTAL HIP ARTHROPLASTY      TOTAL REDUCTION MAMMOPLASTY Bilateral     age 41    UPPER GASTROINTESTINAL ENDOSCOPY         Family History   Problem Relation Age of Onset    Colon cancer Paternal Grandmother     Cancer Paternal Grandmother     Arthritis Paternal Grandmother     Colon cancer Maternal Grandmother     Cancer Maternal Grandmother         colon    Arthritis Maternal Grandmother     Hyperlipidemia Mother     Stroke Mother 78        stents placed for carotid blockage; multiple TIAs    Vision loss Mother     Arthritis Mother     Hypertension Father     Stroke Father 84    Hyperlipidemia Father     Vision loss Father     Arthritis Father     Parkinsonism Maternal Grandfather         passed in his 70's    Heart disease Paternal Grandfather     Asthma Sister         in her 50's    Hypertension Brother     Hypertension Sister     Arthritis Sister     Hypertension Sister     Mental illness Sister         Undiagnosed    Diabetes Maternal Aunt     Heart disease Maternal Aunt     Diabetes Maternal Aunt     Breast cancer Neg Hx     Ovarian cancer Neg Hx        Social History     Socioeconomic History    Marital status:    Tobacco Use    Smoking status: Never Smoker    Smokeless tobacco: Never Used   Substance and Sexual Activity    Alcohol use: Yes     Alcohol/week: 3.0 standard drinks     Types: 1 Glasses of wine, 1 Cans of beer, 1 Shots of liquor per week     Comment: Social drinker, amount depends on social engagements    Drug use: No    Sexual activity: Yes     Partners: Male     Birth control/protection:  Post-menopausal     Social Determinants of Health     Financial Resource Strain: Unknown    Difficulty of Paying Living Expenses: Patient refused   Food Insecurity: Unknown    Worried About Running Out of Food in the Last Year: Patient refused    Ran Out of Food in the Last Year: Patient refused   Transportation Needs: No Transportation Needs    Lack of Transportation (Medical): No    Lack of Transportation (Non-Medical): No   Physical Activity: Insufficiently Active    Days of Exercise per Week: 2 days    Minutes of Exercise per Session: 30 min   Stress: Stress Concern Present    Feeling of Stress : Very much   Social Connections: Unknown    Frequency of Communication with Friends and Family: Patient refused    Frequency of Social Gatherings with Friends and Family: Patient refused    Active Member of Clubs or Organizations: Patient refused    Attends Club or Organization Meetings: Patient refused    Marital Status:    Housing Stability: Low Risk     Unable to Pay for Housing in the Last Year: No    Number of Places Lived in the Last Year: 1    Unstable Housing in the Last Year: No       Review of patient's allergies indicates:   Allergen Reactions    Percocet [oxycodone-acetaminophen] Itching    Pravastatin Other (See Comments)     myalgias       Review of Systems   Constitutional: Negative for decreased appetite, fever and malaise/fatigue.   Eyes: Negative for blurred vision.   Cardiovascular: Negative for chest pain, dyspnea on exertion, irregular heartbeat and leg swelling.   Respiratory: Negative for cough, hemoptysis, shortness of breath and wheezing.    Endocrine: Negative for cold intolerance and heat intolerance.   Hematologic/Lymphatic: Negative for bleeding problem.   Musculoskeletal: Negative for muscle weakness and myalgias.   Gastrointestinal: Negative for abdominal pain, constipation and diarrhea.   Genitourinary: Negative for bladder incontinence.   Neurological: Negative  "for dizziness and weakness.   Psychiatric/Behavioral: Negative for depression.        Objective:     Vitals:    07/21/22 0844   BP: 135/86   BP Location: Right arm   Patient Position: Sitting   BP Method: Medium (Automatic)   Pulse: 75   Weight: 86.7 kg (191 lb 2.2 oz)   Height: 5' 8" (1.727 m)        Physical Exam  Vitals and nursing note reviewed.   Constitutional:       General: She is not in acute distress.     Appearance: She is well-developed.   HENT:      Head: Normocephalic and atraumatic.   Neck:      Vascular: No JVD.   Cardiovascular:      Rate and Rhythm: Normal rate and regular rhythm.      Heart sounds: Normal heart sounds. No murmur heard.    No friction rub. No gallop.   Pulmonary:      Effort: Pulmonary effort is normal. No respiratory distress.      Breath sounds: Normal breath sounds. No wheezing or rales.   Abdominal:      General: Bowel sounds are normal.      Palpations: Abdomen is soft.      Tenderness: There is no abdominal tenderness. There is no guarding or rebound.   Musculoskeletal:         General: No tenderness.      Cervical back: Neck supple.   Skin:     General: Skin is warm and dry.   Neurological:      Mental Status: She is alert and oriented to person, place, and time.   Psychiatric:         Behavior: Behavior normal.             Current Outpatient Medications on File Prior to Visit   Medication Sig    amlodipine-valsartan (EXFORGE) 5-320 mg per tablet Take 1 tablet by mouth once daily.    calcium crb,cit/D3/min34/nancy (CITRACAL + BONE DENSITY ORAL) Citracal + Bone Density    celecoxib (CELEBREX) 200 MG capsule Take 1 capsule (200 mg total) by mouth every morning.    DULoxetine (CYMBALTA) 30 MG capsule Take 1 capsule (30 mg total) by mouth once daily.    estradioL (ESTRACE) 0.01 % (0.1 mg/gram) vaginal cream 0.5 grams with applicator or dime-sized amount with finger in vagina nightly x 2 weeks, then twice a week thereafter    etodolac (LODINE) 400 MG tablet Take 1 tablet " (400 mg total) by mouth 3 (three) times daily as needed for pain.    ezetimibe (ZETIA) 10 mg tablet Take 1 tablet (10 mg total) by mouth once daily.    gabapentin (NEURONTIN) 300 MG capsule Take 1 capsule (300 mg total) by mouth 3 (three) times daily.    latanoprost 0.005 % ophthalmic solution Apply 1 drop into both eyes at bedtime    latanoprost 0.005 % ophthalmic solution Apply 1 drop into both eyes at bedtime    multivitamin/iron/folic acid (CENTRUM WOMEN ORAL) Centrum    SYNTHROID 50 mcg tablet Take 1 tablet (50 mcg total) by mouth once daily.    traZODone (DESYREL) 50 MG tablet Take 1 tablet (50 mg total) by mouth every evening.    butalbital-acetaminophen-caffeine -40 mg (FIORICET, ESGIC) -40 mg per tablet Take 1 tablet by mouth every 4 (four) hours as needed.    [DISCONTINUED] evolocumab (REPATHA SURECLICK) 140 mg/mL PnIj Inject 1 mL (140 mg total) into the skin every 14 (fourteen) days. (Patient not taking: Reported on 7/21/2022)     No current facility-administered medications on file prior to visit.       Lipid Panel:   Lab Results   Component Value Date    CHOL 249 (H) 12/13/2021    HDL 55 12/13/2021    LDLCALC 154.0 12/13/2021    TRIG 200 (H) 12/13/2021    CHOLHDL 22.1 12/13/2021         The 10-year ASCVD risk score ([place] SANDRA Jr., et al., 2013) is: 9.3%    Values used to calculate the score:      Age: 67 years      Sex: Female      Is Non- : No      Diabetic: No      Tobacco smoker: No      Systolic Blood Pressure: 135 mmHg      Is BP treated: Yes      HDL Cholesterol: 55 mg/dL      Total Cholesterol: 249 mg/dL    All pertinent labs, imaging, and EKGs reviewed.  Patient's most recent EKG tracing was personally interpreted by this provider.    Assessment:       1. Primary hypertension    2. Mixed hyperlipidemia    3. Family history of early CAD    4. Dyspnea, unspecified type         Plan:     Symptoms of seemingly vagal syncope and some palpitations, but  infrequent palpitations  BP/Pulse OK today  Most recent echocardiogram reviewed personally     Echocardiogram   Nuclear stress test    Continue amlodipine-valsartan 5-320 mg PO Daily  Intolerance of statins - Gets muscle cramps  Insurance not covering Repatha per her report, so not taking at this time, only taking Zetia  Continue Zetia 10 mg PO Daily  Educated on vasovagal syncope  Emphasized hydration    Continue other cardiac medications  Mediterranean Diet/Cardiovascular Exercise Program    Patient queried and all questions were answered.    F/u in 6 months to reassess      Signed:    Gustavo Reyez MD  7/21/2022 6:52 AM

## 2022-08-19 DIAGNOSIS — M17.0 PRIMARY OSTEOARTHRITIS OF BOTH KNEES: ICD-10-CM

## 2022-08-19 DIAGNOSIS — I10 ESSENTIAL HYPERTENSION: ICD-10-CM

## 2022-08-22 ENCOUNTER — PATIENT MESSAGE (OUTPATIENT)
Dept: CARDIOLOGY | Facility: HOSPITAL | Age: 67
End: 2022-08-22
Payer: MEDICARE

## 2022-08-22 RX ORDER — CELECOXIB 200 MG/1
200 CAPSULE ORAL EVERY MORNING
Qty: 90 CAPSULE | Refills: 1 | Status: SHIPPED | OUTPATIENT
Start: 2022-08-22 | End: 2023-03-04 | Stop reason: SDUPTHER

## 2022-08-22 RX ORDER — AMLODIPINE AND VALSARTAN 5; 320 MG/1; MG/1
1 TABLET ORAL DAILY
Qty: 90 TABLET | Refills: 1 | Status: SHIPPED | OUTPATIENT
Start: 2022-08-22 | End: 2023-02-21 | Stop reason: SDUPTHER

## 2022-08-22 NOTE — TELEPHONE ENCOUNTER
Medication(s) refilled after reviewing chart.      Dante Buitrago NP   [facility]  8/19/22

## 2022-08-23 ENCOUNTER — HOSPITAL ENCOUNTER (OUTPATIENT)
Dept: RADIOLOGY | Facility: HOSPITAL | Age: 67
Discharge: HOME | End: 2022-08-23
Attending: INTERNAL MEDICINE
Payer: MEDICARE

## 2022-08-23 ENCOUNTER — CLINICAL SUPPORT (OUTPATIENT)
Dept: CARDIOLOGY | Facility: HOSPITAL | Age: 67
End: 2022-08-23
Attending: INTERNAL MEDICINE
Payer: MEDICARE

## 2022-08-23 VITALS — BODY MASS INDEX: 28.94 KG/M2 | HEIGHT: 68 IN | WEIGHT: 190.94 LBS | HEART RATE: 61 BPM

## 2022-08-23 VITALS — WEIGHT: 191 LBS | HEIGHT: 68 IN | BODY MASS INDEX: 28.95 KG/M2

## 2022-08-23 DIAGNOSIS — R07.89 ATYPICAL CHEST PAIN: ICD-10-CM

## 2022-08-23 DIAGNOSIS — Z01.30 ENCOUNTER FOR EXAMINATION OF BLOOD PRESSURE WITHOUT ABNORMAL FINDINGS: ICD-10-CM

## 2022-08-23 PROCEDURE — 93016 STRESS TEST WITH MYOCARDIAL PERFUSION (CUPID ONLY): ICD-10-PCS | Mod: ,,, | Performed by: INTERNAL MEDICINE

## 2022-08-23 PROCEDURE — 93306 TTE W/DOPPLER COMPLETE: CPT | Mod: 26,,, | Performed by: INTERNAL MEDICINE

## 2022-08-23 PROCEDURE — 78452 HT MUSCLE IMAGE SPECT MULT: CPT | Mod: 26,,, | Performed by: INTERNAL MEDICINE

## 2022-08-23 PROCEDURE — 93018 PR CARDIAC STRESS TST,INTERP/REPT ONLY: ICD-10-PCS | Mod: ,,, | Performed by: INTERNAL MEDICINE

## 2022-08-23 PROCEDURE — 93306 ECHO (CUPID ONLY): ICD-10-PCS | Mod: 26,,, | Performed by: INTERNAL MEDICINE

## 2022-08-23 PROCEDURE — 93017 CV STRESS TEST TRACING ONLY: CPT | Mod: PO

## 2022-08-23 PROCEDURE — 93306 TTE W/DOPPLER COMPLETE: CPT | Mod: PO

## 2022-08-23 PROCEDURE — 78452 STRESS TEST WITH MYOCARDIAL PERFUSION (CUPID ONLY): ICD-10-PCS | Mod: 26,,, | Performed by: INTERNAL MEDICINE

## 2022-08-23 PROCEDURE — 93016 CV STRESS TEST SUPVJ ONLY: CPT | Mod: ,,, | Performed by: INTERNAL MEDICINE

## 2022-08-23 PROCEDURE — A9502 TC99M TETROFOSMIN: HCPCS | Mod: PO

## 2022-08-23 PROCEDURE — 63600175 PHARM REV CODE 636 W HCPCS: Mod: PO | Performed by: INTERNAL MEDICINE

## 2022-08-23 PROCEDURE — 93018 CV STRESS TEST I&R ONLY: CPT | Mod: ,,, | Performed by: INTERNAL MEDICINE

## 2022-08-23 RX ORDER — REGADENOSON 0.08 MG/ML
0.4 INJECTION, SOLUTION INTRAVENOUS ONCE
Status: COMPLETED | OUTPATIENT
Start: 2022-08-23 | End: 2022-08-23

## 2022-08-23 RX ADMIN — REGADENOSON 0.4 MG: 0.08 INJECTION, SOLUTION INTRAVENOUS at 02:08

## 2022-08-24 LAB
ASCENDING AORTA: 3.09 CM
AV INDEX (PROSTH): 0.95
AV MEAN GRADIENT: 4 MMHG
AV PEAK GRADIENT: 7 MMHG
AV VALVE AREA: 2.91 CM2
AV VELOCITY RATIO: 0.96
BSA FOR ECHO PROCEDURE: 2.04 M2
CV ECHO LV RWT: 0.37 CM
CV PHARM DOSE: 0.4 MG
CV STRESS BASE HR: 61 BPM
DIASTOLIC BLOOD PRESSURE: 93 MMHG
DOP CALC AO PEAK VEL: 1.34 M/S
DOP CALC AO VTI: 28.3 CM
DOP CALC LVOT AREA: 3 CM2
DOP CALC LVOT DIAMETER: 1.97 CM
DOP CALC LVOT PEAK VEL: 1.29 M/S
DOP CALC LVOT STROKE VOLUME: 82.26 CM3
DOP CALCLVOT PEAK VEL VTI: 27 CM
E WAVE DECELERATION TIME: 179.19 MSEC
E/A RATIO: 1.09
E/E' RATIO: 9.6 M/S
ECHO LV POSTERIOR WALL: 0.79 CM (ref 0.6–1.1)
EJECTION FRACTION: 60 %
FRACTIONAL SHORTENING: 29 % (ref 28–44)
INTERVENTRICULAR SEPTUM: 1.06 CM (ref 0.6–1.1)
IVRT: 99.9 MSEC
LA MAJOR: 4.56 CM
LA MINOR: 4.74 CM
LA WIDTH: 3.5 CM
LEFT ATRIUM SIZE: 3.18 CM
LEFT ATRIUM VOLUME INDEX: 22 ML/M2
LEFT ATRIUM VOLUME: 43.97 CM3
LEFT INTERNAL DIMENSION IN SYSTOLE: 3.02 CM (ref 2.1–4)
LEFT VENTRICLE DIASTOLIC VOLUME INDEX: 40.44 ML/M2
LEFT VENTRICLE DIASTOLIC VOLUME: 80.87 ML
LEFT VENTRICLE MASS INDEX: 63 G/M2
LEFT VENTRICLE SYSTOLIC VOLUME INDEX: 17.8 ML/M2
LEFT VENTRICLE SYSTOLIC VOLUME: 35.69 ML
LEFT VENTRICULAR INTERNAL DIMENSION IN DIASTOLE: 4.25 CM (ref 3.5–6)
LEFT VENTRICULAR MASS: 125.58 G
LV LATERAL E/E' RATIO: 8 M/S
LV SEPTAL E/E' RATIO: 12 M/S
LVOT MG: 3.15 MMHG
LVOT MV: 0.81 CM/S
MV PEAK A VEL: 0.66 M/S
MV PEAK E VEL: 0.72 M/S
MV STENOSIS PRESSURE HALF TIME: 51.97 MS
MV VALVE AREA P 1/2 METHOD: 4.23 CM2
OHS CV CPX 1 MINUTE RECOVERY HEART RATE: 80 BPM
OHS CV CPX 85 PERCENT MAX PREDICTED HEART RATE MALE: 126
OHS CV CPX MAX PREDICTED HEART RATE: 149
OHS CV CPX PATIENT IS FEMALE: 1
OHS CV CPX PATIENT IS MALE: 0
OHS CV CPX PEAK DIASTOLIC BLOOD PRESSURE: 93 MMHG
OHS CV CPX PEAK HEAR RATE: 115 BPM
OHS CV CPX PEAK RATE PRESSURE PRODUCT: NORMAL
OHS CV CPX PEAK SYSTOLIC BLOOD PRESSURE: 183 MMHG
OHS CV CPX PERCENT MAX PREDICTED HEART RATE ACHIEVED: 77
OHS CV CPX RATE PRESSURE PRODUCT PRESENTING: NORMAL
OHS CV PHARM TIME: 1420 MIN
PISA TR MAX VEL: 2.36 M/S
PULM VEIN S/D RATIO: 1.2
PV PEAK D VEL: 0.6 M/S
PV PEAK S VEL: 0.72 M/S
RA MAJOR: 3.92 CM
RA PRESSURE: 3 MMHG
RA WIDTH: 2.4 CM
RIGHT VENTRICULAR END-DIASTOLIC DIMENSION: 2.49 CM
RIGHT VENTRICULAR LENGTH IN DIASTOLE (APICAL 4-CHAMBER VIEW): 5.92 CM
RV MID DIAMA: 17.62 CM
RV TISSUE DOPPLER FREE WALL SYSTOLIC VELOCITY 1 (APICAL 4 CHAMBER VIEW): 0.01 CM/S
SINUS: 3.44 CM
STJ: 2.71 CM
SYSTOLIC BLOOD PRESSURE: 183 MMHG
TDI LATERAL: 0.09 M/S
TDI SEPTAL: 0.06 M/S
TDI: 0.08 M/S
TR MAX PG: 22 MMHG
TRICUSPID ANNULAR PLANE SYSTOLIC EXCURSION: 1.59 CM
TV REST PULMONARY ARTERY PRESSURE: 25 MMHG

## 2022-08-29 ENCOUNTER — PATIENT MESSAGE (OUTPATIENT)
Dept: ADMINISTRATIVE | Facility: HOSPITAL | Age: 67
End: 2022-08-29
Payer: MEDICARE

## 2022-08-31 DIAGNOSIS — Z78.0 MENOPAUSE: ICD-10-CM

## 2022-09-19 ENCOUNTER — HOSPITAL ENCOUNTER (OUTPATIENT)
Dept: RADIOLOGY | Facility: HOSPITAL | Age: 67
Discharge: HOME | End: 2022-09-19
Attending: INTERNAL MEDICINE
Payer: MEDICARE

## 2022-09-19 DIAGNOSIS — Z78.0 MENOPAUSE: ICD-10-CM

## 2022-09-19 PROCEDURE — 77080 DEXA BONE DENSITY SPINE HIP: ICD-10-PCS | Mod: 26,,, | Performed by: RADIOLOGY

## 2022-09-19 PROCEDURE — 77080 DXA BONE DENSITY AXIAL: CPT | Mod: 26,,, | Performed by: RADIOLOGY

## 2022-09-19 PROCEDURE — 77080 DXA BONE DENSITY AXIAL: CPT | Mod: TC,PO

## 2022-11-21 ENCOUNTER — LAB VISIT (OUTPATIENT)
Dept: LAB | Facility: HOSPITAL | Age: 67
End: 2022-11-21
Attending: INTERNAL MEDICINE
Payer: MEDICARE

## 2022-11-21 DIAGNOSIS — Z13.1 SCREENING FOR DIABETES MELLITUS: ICD-10-CM

## 2022-11-21 DIAGNOSIS — E78.2 MIXED HYPERLIPIDEMIA: ICD-10-CM

## 2022-11-21 DIAGNOSIS — E89.0 POSTSURGICAL HYPOTHYROIDISM: ICD-10-CM

## 2022-11-21 DIAGNOSIS — I10 PRIMARY HYPERTENSION: ICD-10-CM

## 2022-11-21 LAB
ALBUMIN SERPL BCP-MCNC: 4 G/DL (ref 3.5–5.2)
ALP SERPL-CCNC: 89 U/L (ref 55–135)
ALT SERPL W/O P-5'-P-CCNC: 38 U/L (ref 10–44)
ANION GAP SERPL CALC-SCNC: 9 MMOL/L (ref 8–16)
AST SERPL-CCNC: 28 U/L (ref 10–40)
BILIRUB SERPL-MCNC: 1 MG/DL (ref 0.1–1)
BUN SERPL-MCNC: 10 MG/DL (ref 8–23)
CALCIUM SERPL-MCNC: 9.7 MG/DL (ref 8.7–10.5)
CHLORIDE SERPL-SCNC: 103 MMOL/L (ref 95–110)
CHOLEST SERPL-MCNC: 198 MG/DL (ref 120–199)
CHOLEST/HDLC SERPL: 3.8 {RATIO} (ref 2–5)
CO2 SERPL-SCNC: 28 MMOL/L (ref 23–29)
CREAT SERPL-MCNC: 0.8 MG/DL (ref 0.5–1.4)
EST. GFR  (NO RACE VARIABLE): >60 ML/MIN/1.73 M^2
ESTIMATED AVG GLUCOSE: 105 MG/DL (ref 68–131)
GLUCOSE SERPL-MCNC: 105 MG/DL (ref 70–110)
HBA1C MFR BLD: 5.3 % (ref 4–5.6)
HDLC SERPL-MCNC: 52 MG/DL (ref 40–75)
HDLC SERPL: 26.3 % (ref 20–50)
LDLC SERPL CALC-MCNC: 116.6 MG/DL (ref 63–159)
NONHDLC SERPL-MCNC: 146 MG/DL
POTASSIUM SERPL-SCNC: 4 MMOL/L (ref 3.5–5.1)
PROT SERPL-MCNC: 6.8 G/DL (ref 6–8.4)
SODIUM SERPL-SCNC: 140 MMOL/L (ref 136–145)
TRIGL SERPL-MCNC: 147 MG/DL (ref 30–150)
TSH SERPL DL<=0.005 MIU/L-ACNC: 1.23 UIU/ML (ref 0.4–4)

## 2022-11-21 PROCEDURE — 84443 ASSAY THYROID STIM HORMONE: CPT | Performed by: INTERNAL MEDICINE

## 2022-11-21 PROCEDURE — 80061 LIPID PANEL: CPT | Performed by: INTERNAL MEDICINE

## 2022-11-21 PROCEDURE — 83036 HEMOGLOBIN GLYCOSYLATED A1C: CPT | Performed by: INTERNAL MEDICINE

## 2022-11-21 PROCEDURE — 36415 COLL VENOUS BLD VENIPUNCTURE: CPT | Mod: PO | Performed by: INTERNAL MEDICINE

## 2022-11-21 PROCEDURE — 80053 COMPREHEN METABOLIC PANEL: CPT | Performed by: INTERNAL MEDICINE

## 2022-11-28 ENCOUNTER — OFFICE VISIT (OUTPATIENT)
Dept: FAMILY MEDICINE | Facility: CLINIC | Age: 67
End: 2022-11-28
Payer: COMMERCIAL

## 2022-11-28 VITALS
BODY MASS INDEX: 29.07 KG/M2 | OXYGEN SATURATION: 97 % | HEIGHT: 68 IN | DIASTOLIC BLOOD PRESSURE: 84 MMHG | WEIGHT: 191.81 LBS | HEART RATE: 78 BPM | SYSTOLIC BLOOD PRESSURE: 130 MMHG

## 2022-11-28 DIAGNOSIS — I10 PRIMARY HYPERTENSION: ICD-10-CM

## 2022-11-28 DIAGNOSIS — E78.2 MIXED HYPERLIPIDEMIA: ICD-10-CM

## 2022-11-28 DIAGNOSIS — Z12.39 ENCOUNTER FOR SCREENING FOR MALIGNANT NEOPLASM OF BREAST, UNSPECIFIED SCREENING MODALITY: Primary | ICD-10-CM

## 2022-11-28 PROCEDURE — 3288F PR FALLS RISK ASSESSMENT DOCUMENTED: ICD-10-PCS | Mod: CPTII,S$GLB,, | Performed by: INTERNAL MEDICINE

## 2022-11-28 PROCEDURE — 99213 OFFICE O/P EST LOW 20 MIN: CPT | Mod: S$GLB,,, | Performed by: INTERNAL MEDICINE

## 2022-11-28 PROCEDURE — 1159F PR MEDICATION LIST DOCUMENTED IN MEDICAL RECORD: ICD-10-PCS | Mod: CPTII,S$GLB,, | Performed by: INTERNAL MEDICINE

## 2022-11-28 PROCEDURE — 99999 PR PBB SHADOW E&M-EST. PATIENT-LVL IV: ICD-10-PCS | Mod: PBBFAC,,, | Performed by: INTERNAL MEDICINE

## 2022-11-28 PROCEDURE — 1159F MED LIST DOCD IN RCRD: CPT | Mod: CPTII,S$GLB,, | Performed by: INTERNAL MEDICINE

## 2022-11-28 PROCEDURE — 3075F PR MOST RECENT SYSTOLIC BLOOD PRESS GE 130-139MM HG: ICD-10-PCS | Mod: CPTII,S$GLB,, | Performed by: INTERNAL MEDICINE

## 2022-11-28 PROCEDURE — 3044F PR MOST RECENT HEMOGLOBIN A1C LEVEL <7.0%: ICD-10-PCS | Mod: CPTII,S$GLB,, | Performed by: INTERNAL MEDICINE

## 2022-11-28 PROCEDURE — 1160F PR REVIEW ALL MEDS BY PRESCRIBER/CLIN PHARMACIST DOCUMENTED: ICD-10-PCS | Mod: CPTII,S$GLB,, | Performed by: INTERNAL MEDICINE

## 2022-11-28 PROCEDURE — 4010F PR ACE/ARB THEARPY RXD/TAKEN: ICD-10-PCS | Mod: CPTII,S$GLB,, | Performed by: INTERNAL MEDICINE

## 2022-11-28 PROCEDURE — 99999 PR PBB SHADOW E&M-EST. PATIENT-LVL IV: CPT | Mod: PBBFAC,,, | Performed by: INTERNAL MEDICINE

## 2022-11-28 PROCEDURE — 99213 PR OFFICE/OUTPT VISIT, EST, LEVL III, 20-29 MIN: ICD-10-PCS | Mod: S$GLB,,, | Performed by: INTERNAL MEDICINE

## 2022-11-28 PROCEDURE — 4010F ACE/ARB THERAPY RXD/TAKEN: CPT | Mod: CPTII,S$GLB,, | Performed by: INTERNAL MEDICINE

## 2022-11-28 PROCEDURE — 3288F FALL RISK ASSESSMENT DOCD: CPT | Mod: CPTII,S$GLB,, | Performed by: INTERNAL MEDICINE

## 2022-11-28 PROCEDURE — 3079F DIAST BP 80-89 MM HG: CPT | Mod: CPTII,S$GLB,, | Performed by: INTERNAL MEDICINE

## 2022-11-28 PROCEDURE — 3075F SYST BP GE 130 - 139MM HG: CPT | Mod: CPTII,S$GLB,, | Performed by: INTERNAL MEDICINE

## 2022-11-28 PROCEDURE — 3008F BODY MASS INDEX DOCD: CPT | Mod: CPTII,S$GLB,, | Performed by: INTERNAL MEDICINE

## 2022-11-28 PROCEDURE — 1101F PR PT FALLS ASSESS DOC 0-1 FALLS W/OUT INJ PAST YR: ICD-10-PCS | Mod: CPTII,S$GLB,, | Performed by: INTERNAL MEDICINE

## 2022-11-28 PROCEDURE — 3079F PR MOST RECENT DIASTOLIC BLOOD PRESSURE 80-89 MM HG: ICD-10-PCS | Mod: CPTII,S$GLB,, | Performed by: INTERNAL MEDICINE

## 2022-11-28 PROCEDURE — 1160F RVW MEDS BY RX/DR IN RCRD: CPT | Mod: CPTII,S$GLB,, | Performed by: INTERNAL MEDICINE

## 2022-11-28 PROCEDURE — 3044F HG A1C LEVEL LT 7.0%: CPT | Mod: CPTII,S$GLB,, | Performed by: INTERNAL MEDICINE

## 2022-11-28 PROCEDURE — 3008F PR BODY MASS INDEX (BMI) DOCUMENTED: ICD-10-PCS | Mod: CPTII,S$GLB,, | Performed by: INTERNAL MEDICINE

## 2022-11-28 PROCEDURE — 1101F PT FALLS ASSESS-DOCD LE1/YR: CPT | Mod: CPTII,S$GLB,, | Performed by: INTERNAL MEDICINE

## 2022-11-28 NOTE — PROGRESS NOTES
Patient ID: Gudelia Cerrato is a 67 y.o. female.    Chief Complaint: Results (Review lab work)      HPI     Repatha not covered. Zetia started. Lipids much improved.   Hypertension- needs to check at home.   Had echo and nuc stress, both normal.   Dental issues on augmentin.   Also having sinus issues, taking zyrtec.   Hypothyroid- stable      Assessment and plan     Continue zetia  Continue amlodipine-valsartan   Continue synthroid.     Gudelia was seen today for results.    Diagnoses and all orders for this visit:    Encounter for screening for malignant neoplasm of breast, unspecified screening modality  -     Mammo Digital Screening Bilat w/ Justice; Future    Primary hypertension    Mixed hyperlipidemia      Review of Systems   Constitutional:  Negative for fever.   Respiratory:  Negative for shortness of breath.    Cardiovascular:  Negative for chest pain.   Gastrointestinal:  Negative for abdominal pain.     Physical Exam  Cardiovascular:      Rate and Rhythm: Normal rate and regular rhythm.      Heart sounds: No murmur heard.    No gallop.   Pulmonary:      Breath sounds: Normal breath sounds. No wheezing or rhonchi.   Abdominal:      Palpations: Abdomen is soft.      Tenderness: There is no abdominal tenderness.   Musculoskeletal:         General: Normal range of motion.      Cervical back: Neck supple.   Skin:     General: Skin is warm.      Findings: No rash.   Neurological:      Mental Status: She is alert.   Psychiatric:         Behavior: Behavior normal.       Vitals:    11/28/22 1331   BP: 130/84   Pulse: 78     Wt Readings from Last 3 Encounters:   11/28/22 1331 87 kg (191 lb 12.8 oz)   08/23/22 1612 86.6 kg (190 lb 14.7 oz)   08/23/22 1516 86.6 kg (191 lb)      Body mass index is 29.16 kg/m².      Hypertension Medications               amlodipine-valsartan (EXFORGE) 5-320 mg per tablet Take 1 tablet by mouth once daily.           Hyperlipidemia Medications               ezetimibe (ZETIA) 10 mg tablet  Take 1 tablet (10 mg total) by mouth once daily.           Medication List with Changes/Refills   Current Medications    AMLODIPINE-VALSARTAN (EXFORGE) 5-320 MG PER TABLET    Take 1 tablet by mouth once daily.    BUTALBITAL-ACETAMINOPHEN-CAFFEINE -40 MG (FIORICET, ESGIC) -40 MG PER TABLET    Take 1 tablet by mouth every 4 (four) hours as needed.    CALCIUM CRB,CIT/D3/MIN34/NELLY (CITRACAL + BONE DENSITY ORAL)    Citracal + Bone Density    CELECOXIB (CELEBREX) 200 MG CAPSULE    Take 1 capsule (200 mg total) by mouth every morning.    DULOXETINE (CYMBALTA) 30 MG CAPSULE    Take 1 capsule (30 mg total) by mouth once daily.    ESTRADIOL (ESTRACE) 0.01 % (0.1 MG/GRAM) VAGINAL CREAM    0.5 grams with applicator or dime-sized amount with finger in vagina nightly x 2 weeks, then twice a week thereafter    EZETIMIBE (ZETIA) 10 MG TABLET    Take 1 tablet (10 mg total) by mouth once daily.    GABAPENTIN (NEURONTIN) 300 MG CAPSULE    Take 1 capsule (300 mg total) by mouth 3 (three) times daily.    LATANOPROST 0.005 % OPHTHALMIC SOLUTION    Apply 1 drop into both eyes at bedtime    LATANOPROST 0.005 % OPHTHALMIC SOLUTION    Apply 1 drop into both eyes at bedtime    MULTIVITAMIN/IRON/FOLIC ACID (CENTRUM WOMEN ORAL)    Centrum    SYNTHROID 50 MCG TABLET    Take 1 tablet (50 mcg total) by mouth once daily.    TRAZODONE (DESYREL) 50 MG TABLET    Take 1 tablet (50 mg total) by mouth every evening.       I personally reviewed past medical, family and social history.

## 2022-11-29 ENCOUNTER — TELEPHONE (OUTPATIENT)
Dept: PHARMACY | Facility: CLINIC | Age: 67
End: 2022-11-29
Payer: MEDICARE

## 2023-01-18 ENCOUNTER — HOSPITAL ENCOUNTER (OUTPATIENT)
Dept: RADIOLOGY | Facility: HOSPITAL | Age: 68
Discharge: HOME | End: 2023-01-18
Attending: INTERNAL MEDICINE
Payer: MEDICARE

## 2023-01-18 DIAGNOSIS — Z12.31 BREAST CANCER SCREENING BY MAMMOGRAM: ICD-10-CM

## 2023-01-18 DIAGNOSIS — Z12.39 ENCOUNTER FOR SCREENING FOR MALIGNANT NEOPLASM OF BREAST, UNSPECIFIED SCREENING MODALITY: ICD-10-CM

## 2023-01-18 PROCEDURE — 77063 MAMMO DIGITAL SCREENING BILAT WITH TOMO: ICD-10-PCS | Mod: 26,,, | Performed by: RADIOLOGY

## 2023-01-18 PROCEDURE — 77067 SCR MAMMO BI INCL CAD: CPT | Mod: 26,,, | Performed by: RADIOLOGY

## 2023-01-18 PROCEDURE — 77063 BREAST TOMOSYNTHESIS BI: CPT | Mod: 26,,, | Performed by: RADIOLOGY

## 2023-01-18 PROCEDURE — 77067 MAMMO DIGITAL SCREENING BILAT WITH TOMO: ICD-10-PCS | Mod: 26,,, | Performed by: RADIOLOGY

## 2023-01-18 PROCEDURE — 77067 SCR MAMMO BI INCL CAD: CPT | Mod: TC,PO

## 2023-01-23 ENCOUNTER — OFFICE VISIT (OUTPATIENT)
Dept: CARDIOLOGY | Facility: CLINIC | Age: 68
End: 2023-01-23
Payer: MEDICARE

## 2023-01-23 VITALS
HEART RATE: 82 BPM | DIASTOLIC BLOOD PRESSURE: 84 MMHG | WEIGHT: 192.25 LBS | HEIGHT: 68 IN | SYSTOLIC BLOOD PRESSURE: 138 MMHG | BODY MASS INDEX: 29.14 KG/M2

## 2023-01-23 DIAGNOSIS — E78.2 MIXED HYPERLIPIDEMIA: ICD-10-CM

## 2023-01-23 DIAGNOSIS — I10 PRIMARY HYPERTENSION: Primary | ICD-10-CM

## 2023-01-23 DIAGNOSIS — Z82.49 FAMILY HISTORY OF HEART DISEASE: ICD-10-CM

## 2023-01-23 PROCEDURE — 99214 PR OFFICE/OUTPT VISIT, EST, LEVL IV, 30-39 MIN: ICD-10-PCS | Mod: S$GLB,,, | Performed by: INTERNAL MEDICINE

## 2023-01-23 PROCEDURE — 3288F PR FALLS RISK ASSESSMENT DOCUMENTED: ICD-10-PCS | Mod: CPTII,S$GLB,, | Performed by: INTERNAL MEDICINE

## 2023-01-23 PROCEDURE — 1100F PTFALLS ASSESS-DOCD GE2>/YR: CPT | Mod: CPTII,S$GLB,, | Performed by: INTERNAL MEDICINE

## 2023-01-23 PROCEDURE — 1159F MED LIST DOCD IN RCRD: CPT | Mod: CPTII,S$GLB,, | Performed by: INTERNAL MEDICINE

## 2023-01-23 PROCEDURE — 99999 PR PBB SHADOW E&M-EST. PATIENT-LVL III: CPT | Mod: PBBFAC,,, | Performed by: INTERNAL MEDICINE

## 2023-01-23 PROCEDURE — 1160F RVW MEDS BY RX/DR IN RCRD: CPT | Mod: CPTII,S$GLB,, | Performed by: INTERNAL MEDICINE

## 2023-01-23 PROCEDURE — 1160F PR REVIEW ALL MEDS BY PRESCRIBER/CLIN PHARMACIST DOCUMENTED: ICD-10-PCS | Mod: CPTII,S$GLB,, | Performed by: INTERNAL MEDICINE

## 2023-01-23 PROCEDURE — 3075F PR MOST RECENT SYSTOLIC BLOOD PRESS GE 130-139MM HG: ICD-10-PCS | Mod: CPTII,S$GLB,, | Performed by: INTERNAL MEDICINE

## 2023-01-23 PROCEDURE — 3079F DIAST BP 80-89 MM HG: CPT | Mod: CPTII,S$GLB,, | Performed by: INTERNAL MEDICINE

## 2023-01-23 PROCEDURE — 3008F PR BODY MASS INDEX (BMI) DOCUMENTED: ICD-10-PCS | Mod: CPTII,S$GLB,, | Performed by: INTERNAL MEDICINE

## 2023-01-23 PROCEDURE — 1100F PR PT FALLS ASSESS DOC 2+ FALLS/FALL W/INJURY/YR: ICD-10-PCS | Mod: CPTII,S$GLB,, | Performed by: INTERNAL MEDICINE

## 2023-01-23 PROCEDURE — 99999 PR PBB SHADOW E&M-EST. PATIENT-LVL III: ICD-10-PCS | Mod: PBBFAC,,, | Performed by: INTERNAL MEDICINE

## 2023-01-23 PROCEDURE — 1125F AMNT PAIN NOTED PAIN PRSNT: CPT | Mod: CPTII,S$GLB,, | Performed by: INTERNAL MEDICINE

## 2023-01-23 PROCEDURE — 3288F FALL RISK ASSESSMENT DOCD: CPT | Mod: CPTII,S$GLB,, | Performed by: INTERNAL MEDICINE

## 2023-01-23 PROCEDURE — 3075F SYST BP GE 130 - 139MM HG: CPT | Mod: CPTII,S$GLB,, | Performed by: INTERNAL MEDICINE

## 2023-01-23 PROCEDURE — 3008F BODY MASS INDEX DOCD: CPT | Mod: CPTII,S$GLB,, | Performed by: INTERNAL MEDICINE

## 2023-01-23 PROCEDURE — 99214 OFFICE O/P EST MOD 30 MIN: CPT | Mod: S$GLB,,, | Performed by: INTERNAL MEDICINE

## 2023-01-23 PROCEDURE — 1125F PR PAIN SEVERITY QUANTIFIED, PAIN PRESENT: ICD-10-PCS | Mod: CPTII,S$GLB,, | Performed by: INTERNAL MEDICINE

## 2023-01-23 PROCEDURE — 1159F PR MEDICATION LIST DOCUMENTED IN MEDICAL RECORD: ICD-10-PCS | Mod: CPTII,S$GLB,, | Performed by: INTERNAL MEDICINE

## 2023-01-23 PROCEDURE — 3079F PR MOST RECENT DIASTOLIC BLOOD PRESSURE 80-89 MM HG: ICD-10-PCS | Mod: CPTII,S$GLB,, | Performed by: INTERNAL MEDICINE

## 2023-02-21 DIAGNOSIS — I10 ESSENTIAL HYPERTENSION: ICD-10-CM

## 2023-02-22 RX ORDER — AMLODIPINE AND VALSARTAN 5; 320 MG/1; MG/1
1 TABLET ORAL DAILY
Qty: 90 TABLET | Refills: 1 | Status: SHIPPED | OUTPATIENT
Start: 2023-02-22 | End: 2023-08-30 | Stop reason: SDUPTHER

## 2023-03-04 DIAGNOSIS — M17.0 PRIMARY OSTEOARTHRITIS OF BOTH KNEES: ICD-10-CM

## 2023-03-06 RX ORDER — CELECOXIB 200 MG/1
200 CAPSULE ORAL EVERY MORNING
Qty: 90 CAPSULE | Refills: 1 | Status: SHIPPED | OUTPATIENT
Start: 2023-03-06 | End: 2023-11-07 | Stop reason: SDUPTHER

## 2023-04-17 DIAGNOSIS — F43.0 STRESS REACTION CAUSING MIXED DISTURBANCE OF EMOTION AND CONDUCT: ICD-10-CM

## 2023-04-17 RX ORDER — DULOXETIN HYDROCHLORIDE 30 MG/1
30 CAPSULE, DELAYED RELEASE ORAL DAILY
Qty: 90 CAPSULE | Refills: 1 | Status: SHIPPED | OUTPATIENT
Start: 2023-04-17 | End: 2023-05-12 | Stop reason: SDUPTHER

## 2023-05-11 ENCOUNTER — NURSE TRIAGE (OUTPATIENT)
Dept: ADMINISTRATIVE | Facility: CLINIC | Age: 68
End: 2023-05-11
Payer: MEDICARE

## 2023-05-11 ENCOUNTER — PATIENT MESSAGE (OUTPATIENT)
Dept: FAMILY MEDICINE | Facility: CLINIC | Age: 68
End: 2023-05-11
Payer: MEDICARE

## 2023-05-12 ENCOUNTER — OFFICE VISIT (OUTPATIENT)
Dept: FAMILY MEDICINE | Facility: CLINIC | Age: 68
End: 2023-05-12
Payer: MEDICARE

## 2023-05-12 VITALS
BODY MASS INDEX: 28.95 KG/M2 | HEART RATE: 89 BPM | DIASTOLIC BLOOD PRESSURE: 85 MMHG | SYSTOLIC BLOOD PRESSURE: 135 MMHG | WEIGHT: 191 LBS | OXYGEN SATURATION: 97 % | HEIGHT: 68 IN

## 2023-05-12 DIAGNOSIS — F32.A DEPRESSION, UNSPECIFIED DEPRESSION TYPE: ICD-10-CM

## 2023-05-12 DIAGNOSIS — E66.3 OVERWEIGHT (BMI 25.0-29.9): ICD-10-CM

## 2023-05-12 DIAGNOSIS — I10 PRIMARY HYPERTENSION: ICD-10-CM

## 2023-05-12 DIAGNOSIS — E89.0 POSTSURGICAL HYPOTHYROIDISM: ICD-10-CM

## 2023-05-12 DIAGNOSIS — R30.0 DYSURIA: Primary | ICD-10-CM

## 2023-05-12 DIAGNOSIS — E78.2 MIXED HYPERLIPIDEMIA: ICD-10-CM

## 2023-05-12 DIAGNOSIS — N30.01 ACUTE CYSTITIS WITH HEMATURIA: ICD-10-CM

## 2023-05-12 LAB
BILIRUB UR QL STRIP: NEGATIVE
CLARITY UR: CLEAR
COLOR UR: YELLOW
GLUCOSE UR QL STRIP: NEGATIVE
HGB UR QL STRIP: ABNORMAL
KETONES UR QL STRIP: NEGATIVE
LEUKOCYTE ESTERASE UR QL STRIP: ABNORMAL
NITRITE UR QL STRIP: POSITIVE
PH UR STRIP: 6 [PH] (ref 5–8)
PROT UR QL STRIP: ABNORMAL
SP GR UR STRIP: 1.02 (ref 1–1.03)
URN SPEC COLLECT METH UR: ABNORMAL

## 2023-05-12 PROCEDURE — 99999 PR PBB SHADOW E&M-EST. PATIENT-LVL IV: CPT | Mod: PBBFAC,,, | Performed by: INTERNAL MEDICINE

## 2023-05-12 PROCEDURE — 1160F RVW MEDS BY RX/DR IN RCRD: CPT | Mod: CPTII,S$GLB,, | Performed by: INTERNAL MEDICINE

## 2023-05-12 PROCEDURE — 1126F AMNT PAIN NOTED NONE PRSNT: CPT | Mod: CPTII,S$GLB,, | Performed by: INTERNAL MEDICINE

## 2023-05-12 PROCEDURE — 3075F PR MOST RECENT SYSTOLIC BLOOD PRESS GE 130-139MM HG: ICD-10-PCS | Mod: CPTII,S$GLB,, | Performed by: INTERNAL MEDICINE

## 2023-05-12 PROCEDURE — 3079F DIAST BP 80-89 MM HG: CPT | Mod: CPTII,S$GLB,, | Performed by: INTERNAL MEDICINE

## 2023-05-12 PROCEDURE — 99999 PR PBB SHADOW E&M-EST. PATIENT-LVL IV: ICD-10-PCS | Mod: PBBFAC,,, | Performed by: INTERNAL MEDICINE

## 2023-05-12 PROCEDURE — 4010F PR ACE/ARB THEARPY RXD/TAKEN: ICD-10-PCS | Mod: CPTII,S$GLB,, | Performed by: INTERNAL MEDICINE

## 2023-05-12 PROCEDURE — 3288F PR FALLS RISK ASSESSMENT DOCUMENTED: ICD-10-PCS | Mod: CPTII,S$GLB,, | Performed by: INTERNAL MEDICINE

## 2023-05-12 PROCEDURE — 4010F ACE/ARB THERAPY RXD/TAKEN: CPT | Mod: CPTII,S$GLB,, | Performed by: INTERNAL MEDICINE

## 2023-05-12 PROCEDURE — 3079F PR MOST RECENT DIASTOLIC BLOOD PRESSURE 80-89 MM HG: ICD-10-PCS | Mod: CPTII,S$GLB,, | Performed by: INTERNAL MEDICINE

## 2023-05-12 PROCEDURE — 1101F PT FALLS ASSESS-DOCD LE1/YR: CPT | Mod: CPTII,S$GLB,, | Performed by: INTERNAL MEDICINE

## 2023-05-12 PROCEDURE — 1101F PR PT FALLS ASSESS DOC 0-1 FALLS W/OUT INJ PAST YR: ICD-10-PCS | Mod: CPTII,S$GLB,, | Performed by: INTERNAL MEDICINE

## 2023-05-12 PROCEDURE — 81003 URINALYSIS AUTO W/O SCOPE: CPT | Mod: PO | Performed by: INTERNAL MEDICINE

## 2023-05-12 PROCEDURE — 1126F PR PAIN SEVERITY QUANTIFIED, NO PAIN PRESENT: ICD-10-PCS | Mod: CPTII,S$GLB,, | Performed by: INTERNAL MEDICINE

## 2023-05-12 PROCEDURE — 1159F MED LIST DOCD IN RCRD: CPT | Mod: CPTII,S$GLB,, | Performed by: INTERNAL MEDICINE

## 2023-05-12 PROCEDURE — 1159F PR MEDICATION LIST DOCUMENTED IN MEDICAL RECORD: ICD-10-PCS | Mod: CPTII,S$GLB,, | Performed by: INTERNAL MEDICINE

## 2023-05-12 PROCEDURE — 3075F SYST BP GE 130 - 139MM HG: CPT | Mod: CPTII,S$GLB,, | Performed by: INTERNAL MEDICINE

## 2023-05-12 PROCEDURE — 1160F PR REVIEW ALL MEDS BY PRESCRIBER/CLIN PHARMACIST DOCUMENTED: ICD-10-PCS | Mod: CPTII,S$GLB,, | Performed by: INTERNAL MEDICINE

## 2023-05-12 PROCEDURE — 3008F BODY MASS INDEX DOCD: CPT | Mod: CPTII,S$GLB,, | Performed by: INTERNAL MEDICINE

## 2023-05-12 PROCEDURE — 87086 URINE CULTURE/COLONY COUNT: CPT | Performed by: INTERNAL MEDICINE

## 2023-05-12 PROCEDURE — 99214 OFFICE O/P EST MOD 30 MIN: CPT | Mod: S$GLB,,, | Performed by: INTERNAL MEDICINE

## 2023-05-12 PROCEDURE — 3288F FALL RISK ASSESSMENT DOCD: CPT | Mod: CPTII,S$GLB,, | Performed by: INTERNAL MEDICINE

## 2023-05-12 PROCEDURE — 99214 PR OFFICE/OUTPT VISIT, EST, LEVL IV, 30-39 MIN: ICD-10-PCS | Mod: S$GLB,,, | Performed by: INTERNAL MEDICINE

## 2023-05-12 PROCEDURE — 3008F PR BODY MASS INDEX (BMI) DOCUMENTED: ICD-10-PCS | Mod: CPTII,S$GLB,, | Performed by: INTERNAL MEDICINE

## 2023-05-12 RX ORDER — DULOXETIN HYDROCHLORIDE 30 MG/1
30 CAPSULE, DELAYED RELEASE ORAL DAILY
Qty: 90 CAPSULE | Refills: 1 | Status: SHIPPED | OUTPATIENT
Start: 2023-05-12 | End: 2023-11-13 | Stop reason: SDUPTHER

## 2023-05-12 RX ORDER — NITROFURANTOIN 25; 75 MG/1; MG/1
100 CAPSULE ORAL 2 TIMES DAILY
Qty: 10 CAPSULE | Refills: 0 | Status: SHIPPED | OUTPATIENT
Start: 2023-05-12 | End: 2023-06-07

## 2023-05-12 RX ORDER — FLUCONAZOLE 150 MG/1
150 TABLET ORAL
Qty: 2 TABLET | Refills: 0 | Status: SHIPPED | OUTPATIENT
Start: 2023-05-12 | End: 2023-05-18

## 2023-05-12 NOTE — TELEPHONE ENCOUNTER
Patient stated that she did not go to the UC, but took a overcounter medicine that turned her urine orange she is feeling a little better but still experiencing symptoms advised to go to the UC or ED

## 2023-05-12 NOTE — TELEPHONE ENCOUNTER
"Painful urination, "pink tinge" which started this afternoon.  Care advice states to see a provider within 24 hours.  No appointments available with Dr. Norwood.  Please reach out to Ms. Parker in the am to fit her in or discuss her options.  Pt is requesting a UA to be done prior to the weekend, and get any meds that may be needed prior to the weekend.  OAC offered and declined.  Patient verbally understands, all questions answered, advised to call back for any worsening symptoms or further needs.      Reason for Disposition   [1] Can't control passage of urine (i.e., urinary incontinence) AND [2] new-onset (< 2 weeks) or worsening    Additional Information   Negative: Shock suspected (e.g., cold/pale/clammy skin, too weak to stand, low BP, rapid pulse)   Negative: Sounds like a life-threatening emergency to the triager   Negative: [1] Taking antibiotic for urinary tract infection (UTI) AND [2] female   Negative: [1] Unable to urinate (or only a few drops) > 4 hours AND [2] bladder feels very full (e.g., palpable bladder or strong urge to urinate)   Negative: [1] Decreased urination and [2] drinking very little AND [2] dehydration suspected (e.g., dark urine, no urine > 12 hours, very dry mouth, very lightheaded)   Negative: Patient sounds very sick or weak to the triager   Negative: Fever > 100.4 F (38.0 C)   Negative: Side (flank) or lower back pain present   Negative: Urinating more frequently than usual (i.e., frequency)   Negative: Bad or foul-smelling urine    Protocols used: Urinary Symptoms-A-AH    "

## 2023-05-12 NOTE — PROGRESS NOTES
Patient ID: Gudelia Cerrato is a 67 y.o. female.    Chief Complaint: Urinary Tract Infection (Started wenessday but did not get really bad until Thursday took over the counter Azo and turned her pee orange still experiencing symptoms)        HPI - Assessment - Plan      Dysuria started wed.  Yesterday afternoon it got worse. Azo helped some.     She is needing refill on cymbalta which helps with her depression.  On a good note her  did stop drinking alcohol and using opioids.    1. Dysuria   - UTI diagnosed, started nitrofurantoin and Rx for fluconazole   2. Depression, unspecified depression type   - refill cymbalta    3. Mixed hyperlipidemia   - Tolerating Zetia, controlled, continue    4. Primary hypertension   - controlled, tolerating Exforge, continue medication   5. Overweight (BMI 25.0-29.9)    6. Postsurgical hypothyroidism   - controlled, continue levothyroxine         Review of Systems   Constitutional:  Negative for fever.   Respiratory:  Negative for shortness of breath.    Cardiovascular:  Negative for chest pain.   Gastrointestinal:  Negative for abdominal pain.   Genitourinary:  Positive for dysuria.      Objective     Vitals:    05/12/23 1136   BP: 135/85   Pulse:      Wt Readings from Last 3 Encounters:   05/12/23 1056 86.6 kg (190 lb 15.8 oz)   01/23/23 1037 87.2 kg (192 lb 3.9 oz)   11/28/22 1331 87 kg (191 lb 12.8 oz)      Body mass index is 29.04 kg/m².   Physical Exam  Cardiovascular:      Rate and Rhythm: Normal rate and regular rhythm.      Heart sounds: No murmur heard.    No gallop.   Pulmonary:      Breath sounds: Normal breath sounds. No wheezing or rhonchi.   Abdominal:      Palpations: Abdomen is soft.      Tenderness: There is no abdominal tenderness.        Orders     Gudelia was seen today for urinary tract infection.    Diagnoses and all orders for this visit:    Dysuria  -     Urinalysis, Reflex to Urine Culture Urine, Clean Catch    Depression, unspecified depression  type  -     DULoxetine (CYMBALTA) 30 MG capsule; Take 1 capsule (30 mg total) by mouth once daily.    Mixed hyperlipidemia  -     Comprehensive Metabolic Panel; Future  -     Lipid Panel; Future    Primary hypertension  -     Comprehensive Metabolic Panel; Future  -     CBC Auto Differential; Future    Overweight (BMI 25.0-29.9)    Postsurgical hypothyroidism  -     TSH; Future    Acute cystitis with hematuria  -     nitrofurantoin, macrocrystal-monohydrate, (MACROBID) 100 MG capsule; Take 1 capsule (100 mg total) by mouth 2 (two) times daily.    Other orders  -     Cancel: Urinalysis Microscopic  -     Urine culture              Hypertension Medications               amlodipine-valsartan (EXFORGE) 5-320 mg per tablet Take 1 tablet by mouth once daily.           Hyperlipidemia Medications               ezetimibe (ZETIA) 10 mg tablet Take 1 tablet (10 mg total) by mouth once daily.           Medication List with Changes/Refills   New Medications    NITROFURANTOIN, MACROCRYSTAL-MONOHYDRATE, (MACROBID) 100 MG CAPSULE    Take 1 capsule (100 mg total) by mouth 2 (two) times daily.   Current Medications    AMLODIPINE-VALSARTAN (EXFORGE) 5-320 MG PER TABLET    Take 1 tablet by mouth once daily.    BUTALBITAL-ACETAMINOPHEN-CAFFEINE -40 MG (FIORICET, ESGIC) -40 MG PER TABLET    Take 1 tablet by mouth every 4 (four) hours as needed.    CALCIUM CRB,CIT/D3/MIN34/NELLY (CITRACAL + BONE DENSITY ORAL)    Citracal + Bone Density    CELECOXIB (CELEBREX) 200 MG CAPSULE    Take 1 capsule (200 mg total) by mouth every morning.    ESTRADIOL (ESTRACE) 0.01 % (0.1 MG/GRAM) VAGINAL CREAM    0.5 grams with applicator or dime-sized amount with finger in vagina nightly x 2 weeks, then twice a week thereafter    EZETIMIBE (ZETIA) 10 MG TABLET    Take 1 tablet (10 mg total) by mouth once daily.    GABAPENTIN (NEURONTIN) 300 MG CAPSULE    Take 1 capsule (300 mg total) by mouth 3 (three) times daily.    LATANOPROST 0.005 % OPHTHALMIC  SOLUTION    Apply 1 drop into both eyes at bedtime    LATANOPROST 0.005 % OPHTHALMIC SOLUTION    Apply 1 drop into both eyes at bedtime    MULTIVITAMIN/IRON/FOLIC ACID (CENTRUM WOMEN ORAL)    Centrum    SYNTHROID 50 MCG TABLET    Take 1 tablet (50 mcg total) by mouth once daily.    TRAZODONE (DESYREL) 50 MG TABLET    Take 1 tablet (50 mg total) by mouth every evening.   Changed and/or Refilled Medications    Modified Medication Previous Medication    DULOXETINE (CYMBALTA) 30 MG CAPSULE DULoxetine (CYMBALTA) 30 MG capsule       Take 1 capsule (30 mg total) by mouth once daily.    Take 1 capsule (30 mg total) by mouth once daily.       I personally reviewed past medical, family and social history.    Patient Active Problem List   Diagnosis    Mixed hyperlipidemia    Postsurgical hypothyroidism    Mixed stress and urge urinary incontinence    Primary hypertension    Hormone replacement therapy (HRT)    History of hepatitis C    Overweight (BMI 25.0-29.9)    Osteoarthritis of both knees    Recurrent major depressive disorder, in full remission    Class 1 obesity with serious comorbidity and body mass index (BMI) of 30.0 to 30.9 in adult    Vaginal atrophy    Fecal smearing    Rectal urgency    Cystocele, midline    Weakness    Urinary urgency    Urinary frequency    Dyspnea

## 2023-05-13 LAB
BACTERIA UR CULT: NORMAL
BACTERIA UR CULT: NORMAL

## 2023-06-04 DIAGNOSIS — E89.0 POSTSURGICAL HYPOTHYROIDISM: ICD-10-CM

## 2023-06-04 NOTE — TELEPHONE ENCOUNTER
No care due was identified.  [facility] Embedded Care Due Messages. Reference number: [id number].   6/04/2023 5:08:26 AM CDT

## 2023-06-05 RX ORDER — LEVOTHYROXINE SODIUM 50 UG/1
50 TABLET ORAL DAILY
Qty: 90 TABLET | Refills: 1 | Status: SHIPPED | OUTPATIENT
Start: 2023-06-05 | End: 2024-03-15 | Stop reason: SDUPTHER

## 2023-06-05 NOTE — TELEPHONE ENCOUNTER
Refill Decision Note   Gudelia Cerrato  is requesting a refill authorization.  Brief Assessment and Rationale for Refill:  Approve     Medication Therapy Plan:         Comments:     Note composed:10:44 AM 06/05/2023             Appointments     Last Visit   5/12/2023 Jaxson Norwood, DO   Next Visit   11/13/2023 Jaxson Norwood, DO

## 2023-06-06 ENCOUNTER — PATIENT MESSAGE (OUTPATIENT)
Dept: FAMILY MEDICINE | Facility: CLINIC | Age: 68
End: 2023-06-06
Payer: MEDICARE

## 2023-06-06 DIAGNOSIS — E78.2 MIXED HYPERLIPIDEMIA: ICD-10-CM

## 2023-06-07 ENCOUNTER — OFFICE VISIT (OUTPATIENT)
Dept: FAMILY MEDICINE | Facility: CLINIC | Age: 68
End: 2023-06-07
Payer: MEDICARE

## 2023-06-07 DIAGNOSIS — N30.00 ACUTE CYSTITIS WITHOUT HEMATURIA: Primary | ICD-10-CM

## 2023-06-07 PROCEDURE — 4010F PR ACE/ARB THEARPY RXD/TAKEN: ICD-10-PCS | Mod: CPTII,95,, | Performed by: INTERNAL MEDICINE

## 2023-06-07 PROCEDURE — 1160F RVW MEDS BY RX/DR IN RCRD: CPT | Mod: CPTII,95,, | Performed by: INTERNAL MEDICINE

## 2023-06-07 PROCEDURE — 99213 OFFICE O/P EST LOW 20 MIN: CPT | Mod: 95,,, | Performed by: INTERNAL MEDICINE

## 2023-06-07 PROCEDURE — 1160F PR REVIEW ALL MEDS BY PRESCRIBER/CLIN PHARMACIST DOCUMENTED: ICD-10-PCS | Mod: CPTII,95,, | Performed by: INTERNAL MEDICINE

## 2023-06-07 PROCEDURE — 1159F PR MEDICATION LIST DOCUMENTED IN MEDICAL RECORD: ICD-10-PCS | Mod: CPTII,95,, | Performed by: INTERNAL MEDICINE

## 2023-06-07 PROCEDURE — 99213 PR OFFICE/OUTPT VISIT, EST, LEVL III, 20-29 MIN: ICD-10-PCS | Mod: 95,,, | Performed by: INTERNAL MEDICINE

## 2023-06-07 PROCEDURE — 1159F MED LIST DOCD IN RCRD: CPT | Mod: CPTII,95,, | Performed by: INTERNAL MEDICINE

## 2023-06-07 PROCEDURE — 4010F ACE/ARB THERAPY RXD/TAKEN: CPT | Mod: CPTII,95,, | Performed by: INTERNAL MEDICINE

## 2023-06-07 RX ORDER — EZETIMIBE 10 MG/1
10 TABLET ORAL DAILY
Qty: 90 TABLET | Refills: 1 | Status: SHIPPED | OUTPATIENT
Start: 2023-06-07 | End: 2023-11-13

## 2023-06-07 RX ORDER — FLUCONAZOLE 150 MG/1
150 TABLET ORAL
Qty: 2 TABLET | Refills: 0 | Status: SHIPPED | OUTPATIENT
Start: 2023-06-07 | End: 2023-06-13

## 2023-06-07 RX ORDER — CEPHALEXIN 500 MG/1
500 CAPSULE ORAL EVERY 12 HOURS
Qty: 14 CAPSULE | Refills: 0 | Status: SHIPPED | OUTPATIENT
Start: 2023-06-07 | End: 2023-06-14

## 2023-06-07 NOTE — TELEPHONE ENCOUNTER
No care due was identified.  [facility] Embedded Care Due Messages. Reference number: [id number].   6/06/2023 11:33:30 PM CDT

## 2023-06-07 NOTE — PROGRESS NOTES
Patient ID: Gudelia Cerrato is a 67 y.o. female.    Chief Complaint: No chief complaint on file.    VIRTUAL VISIT    Assessment and Plan      1. Acute cystitis without hematuria    This time will prescribe Keflex for 14 days (Last RX was for nitrofurantoin). If symptoms do not improve will need urinalysis and possibly pursue different diagnosis.     HPI     Pain with urination, frequency, urgency start a couple days ago. Recall UTI in May. No back pain, no overt bleeding, no fever. Requesting to treat empirically without urinalysis    Review of Systems   Constitutional:  Negative for chills.   Gastrointestinal:  Positive for nausea. Negative for constipation and vomiting.   Genitourinary:  Positive for dysuria, frequency and urgency. Negative for flank pain and hematuria.   Skin:  Negative for rash.      Orders     Diagnoses and all orders for this visit:    Acute cystitis without hematuria  -     cephALEXin (KEFLEX) 500 MG capsule; Take 1 capsule (500 mg total) by mouth every 12 (twelve) hours. for 7 days    Other orders  -     fluconazole (DIFLUCAN) 150 MG Tab; Take 1 tablet (150 mg total) by mouth Every 3 (three) days. for 6 days          Wt Readings from Last 3 Encounters:   05/12/23 1056 86.6 kg (190 lb 15.8 oz)   01/23/23 1037 87.2 kg (192 lb 3.9 oz)   11/28/22 1331 87 kg (191 lb 12.8 oz)     Medication List with Changes/Refills   New Medications    CEPHALEXIN (KEFLEX) 500 MG CAPSULE    Take 1 capsule (500 mg total) by mouth every 12 (twelve) hours. for 7 days    FLUCONAZOLE (DIFLUCAN) 150 MG TAB    Take 1 tablet (150 mg total) by mouth Every 3 (three) days. for 6 days   Current Medications    AMLODIPINE-VALSARTAN (EXFORGE) 5-320 MG PER TABLET    Take 1 tablet by mouth once daily.    BUTALBITAL-ACETAMINOPHEN-CAFFEINE -40 MG (FIORICET, ESGIC) -40 MG PER TABLET    Take 1 tablet by mouth every 4 (four) hours as needed.    CALCIUM CRB,CIT/D3/MIN34/NELLY (CITRACAL + BONE DENSITY ORAL)    Citracal +  Bone Density    CELECOXIB (CELEBREX) 200 MG CAPSULE    Take 1 capsule (200 mg total) by mouth every morning.    DULOXETINE (CYMBALTA) 30 MG CAPSULE    Take 1 capsule (30 mg total) by mouth once daily.    ESTRADIOL (ESTRACE) 0.01 % (0.1 MG/GRAM) VAGINAL CREAM    0.5 grams with applicator or dime-sized amount with finger in vagina nightly x 2 weeks, then twice a week thereafter    EZETIMIBE (ZETIA) 10 MG TABLET    Take 1 tablet (10 mg total) by mouth once daily.    GABAPENTIN (NEURONTIN) 300 MG CAPSULE    Take 1 capsule (300 mg total) by mouth 3 (three) times daily.    LATANOPROST 0.005 % OPHTHALMIC SOLUTION    Apply 1 drop into both eyes at bedtime    LATANOPROST 0.005 % OPHTHALMIC SOLUTION    Apply 1 drop into both eyes at bedtime    MULTIVITAMIN/IRON/FOLIC ACID (CENTRUM WOMEN ORAL)    Centrum    SYNTHROID 50 MCG TABLET    Take 1 tablet (50 mcg total) by mouth once daily.    TRAZODONE (DESYREL) 50 MG TABLET    Take 1 tablet (50 mg total) by mouth every evening.   Discontinued Medications    NITROFURANTOIN, MACROCRYSTAL-MONOHYDRATE, (MACROBID) 100 MG CAPSULE    Take 1 capsule (100 mg total) by mouth 2 (two) times daily.         Hypertension Medications               amlodipine-valsartan (EXFORGE) 5-320 mg per tablet Take 1 tablet by mouth once daily.              Hyperlipidemia Medications               ezetimibe (ZETIA) 10 mg tablet Take 1 tablet (10 mg total) by mouth once daily.             The patient location is:  [place]  The chief complaint leading to consultation is: UTI  Face to Face time with patient: 18 minutes  minutes of total time spent on the encounter, which includes face to face time and   non-face to face time preparing to see the patient (eg, review of tests), Obtaining and/or   reviewing separately obtained history, Documenting clinical information in the electronic   or other health record, Independently interpreting results (not separately reported) and   communicating results to the  patient/family/caregiver, or   Care coordination (not separately reported).     Each patient to whom he or she provides medical services by telemedicine is:    (1) informed of the relationship between the physician and patient and the respective   role of any other health care provider with respect to management of the patient; and   (2) notified that he or she may decline to receive medical services by telemedicine and   may withdraw from such care at any time.    I personally reviewed past medical, family and social history.     Answers submitted by the patient for this visit:  Painful Urination Questionnaire (Submitted on 6/7/2023)  Chief Complaint: Dysuria  Chronicity: recurrent  Onset: yesterday  Frequency: every urination  Progression since onset: waxing and waning  Pain quality: burning, stabbing  Pain - numeric: 8/10  Fever: no fever  Sexually active?: No  History of pyelonephritis?: No  discharge: No  hesitancy: Yes  possible pregnancy: No  sweats: No  weight loss: No  withholding: No  behavior changes: No  Treatments tried: increased fluids  Improvement on treatment: mild  Pain severity: moderate  catheterization: No  diabetes insipidus: No  diabetes mellitus: No  genitourinary reflux: No  hypertension: Yes  recurrent UTIs: Yes  single kidney: No  STD: No  urinary stasis: No  urological procedure: No  kidney stones: Yes

## 2023-06-08 NOTE — TELEPHONE ENCOUNTER
Refill Decision Note   Gudelia Cerrato  is requesting a refill authorization.  Brief Assessment and Rationale for Refill:  Approve     Medication Therapy Plan:         Comments:     Note composed:11:53 PM 06/07/2023

## 2023-07-07 DIAGNOSIS — F43.0 STRESS REACTION CAUSING MIXED DISTURBANCE OF EMOTION AND CONDUCT: ICD-10-CM

## 2023-07-07 RX ORDER — TRAZODONE HYDROCHLORIDE 50 MG/1
50 TABLET ORAL NIGHTLY
Qty: 90 TABLET | Refills: 1 | Status: SHIPPED | OUTPATIENT
Start: 2023-07-07 | End: 2024-03-15 | Stop reason: SDUPTHER

## 2023-07-07 NOTE — TELEPHONE ENCOUNTER
No care due was identified.  [facility] Embedded Care Due Messages. Reference number: [id number].   7/07/2023 5:08:25 AM CDT

## 2023-08-30 DIAGNOSIS — I10 ESSENTIAL HYPERTENSION: ICD-10-CM

## 2023-08-30 RX ORDER — AMLODIPINE AND VALSARTAN 5; 320 MG/1; MG/1
1 TABLET ORAL DAILY
Qty: 90 TABLET | Refills: 0 | Status: SHIPPED | OUTPATIENT
Start: 2023-08-30 | End: 2023-12-16 | Stop reason: SDUPTHER

## 2023-08-30 NOTE — TELEPHONE ENCOUNTER
Care Due:                  Date            Visit Type   Department     Provider  --------------------------------------------------------------------------------                                ESTABLISHED                              PATIENT -    [facility]  Last Visit: 06-      VIRTUAL      MEDICINE       Jaxson Norwood                              ESTABLISHED   [facility]  Next Visit: 11-      PATIENT      MEDICINE       Jaxson Norwood                                                            Last  Test          Frequency    Reason                     Performed    Due Date  --------------------------------------------------------------------------------    CMP.........  12 months..  DULoxetine,                11- 11-                             amlodipine-valsartan,                             ezetimibe................    Lipid Panel.  12 months..  ezetimibe................  11- 11-    TSH.........  12 months..  SYNTHROID................  11- 11-    [facility] Embedded Care Due Messages. Reference number: [id number].   8/30/2023 5:08:19 AM CDT

## 2023-08-30 NOTE — TELEPHONE ENCOUNTER
Provider Staff:  Action required for this patient     Please see care gap opportunities below in Care Due Message.    Thanks!  [facility]     Appointments      Date Provider   Last Visit   6/7/2023 Jaxson Norwood, DO   Next Visit   11/13/2023 Jaxson Norwood, DO     Refill Decision Note   Gudelia Cerrato  is requesting a refill authorization.  Brief Assessment and Rationale for Refill:  Approve     Medication Therapy Plan:         Comments:     Note composed:1:32 PM 08/30/2023

## 2023-09-07 ENCOUNTER — PATIENT MESSAGE (OUTPATIENT)
Dept: CARDIOLOGY | Facility: CLINIC | Age: 68
End: 2023-09-07
Payer: MEDICARE

## 2023-09-08 ENCOUNTER — TELEPHONE (OUTPATIENT)
Dept: CARDIOLOGY | Facility: CLINIC | Age: 68
End: 2023-09-08
Payer: MEDICARE

## 2023-09-12 ENCOUNTER — TELEPHONE (OUTPATIENT)
Dept: CARDIOLOGY | Facility: CLINIC | Age: 68
End: 2023-09-12
Payer: MEDICARE

## 2023-10-11 NOTE — TELEPHONE ENCOUNTER
Please see the attached refill request.   You were seen today for dental pain. Please take antibiotics as prescribed. I recommend motrin and tylenol for pain. You can also use Orajel for pain. Please follow up with a dentist as soon as possible. Please return to the ER if you have fever, chills, difficulty breathing, you cannot swallow, or you experience voice changes. Medications: Continue taking your home medications as previously directed. For pain You may take tylenol 1,000mg by mouth every 6 hours as needed for pain. Do not exceed 4,000mg per day. If you have liver disease don't take tylenol. You may also take ibuprofen 600mg every 6-8 hours as needed for pain. Do not exceed 2,400 mg per day. If you experience stomach pain or you have a history of kidney disease stop taking ibuprofen. You may alternate application of ice and heat 20 minutes at a time as desired.       Follow up: Please follow up with your dentist as soon as possible

## 2023-11-06 ENCOUNTER — LAB VISIT (OUTPATIENT)
Dept: LAB | Facility: HOSPITAL | Age: 68
End: 2023-11-06
Attending: INTERNAL MEDICINE
Payer: MEDICARE

## 2023-11-06 DIAGNOSIS — E78.2 MIXED HYPERLIPIDEMIA: ICD-10-CM

## 2023-11-06 DIAGNOSIS — I10 PRIMARY HYPERTENSION: ICD-10-CM

## 2023-11-06 DIAGNOSIS — E89.0 POSTSURGICAL HYPOTHYROIDISM: ICD-10-CM

## 2023-11-06 LAB
ALBUMIN SERPL BCP-MCNC: 4 G/DL (ref 3.5–5.2)
ALP SERPL-CCNC: 124 U/L (ref 55–135)
ALT SERPL W/O P-5'-P-CCNC: 31 U/L (ref 10–44)
ANION GAP SERPL CALC-SCNC: 12 MMOL/L (ref 8–16)
AST SERPL-CCNC: 26 U/L (ref 10–40)
BASOPHILS # BLD AUTO: 0.06 K/UL (ref 0–0.2)
BASOPHILS NFR BLD: 1.2 % (ref 0–1.9)
BILIRUB SERPL-MCNC: 1.2 MG/DL (ref 0.1–1)
BUN SERPL-MCNC: 11 MG/DL (ref 8–23)
CALCIUM SERPL-MCNC: 9.9 MG/DL (ref 8.7–10.5)
CHLORIDE SERPL-SCNC: 107 MMOL/L (ref 95–110)
CHOLEST SERPL-MCNC: 215 MG/DL (ref 120–199)
CHOLEST/HDLC SERPL: 4.1 {RATIO} (ref 2–5)
CO2 SERPL-SCNC: 26 MMOL/L (ref 23–29)
CREAT SERPL-MCNC: 0.8 MG/DL (ref 0.5–1.4)
DIFFERENTIAL METHOD: NORMAL
EOSINOPHIL # BLD AUTO: 0.2 K/UL (ref 0–0.5)
EOSINOPHIL NFR BLD: 4.9 % (ref 0–8)
ERYTHROCYTE [DISTWIDTH] IN BLOOD BY AUTOMATED COUNT: 13.1 % (ref 11.5–14.5)
EST. GFR  (NO RACE VARIABLE): >60 ML/MIN/1.73 M^2
GLUCOSE SERPL-MCNC: 110 MG/DL (ref 70–110)
HCT VFR BLD AUTO: 47.1 % (ref 37–48.5)
HDLC SERPL-MCNC: 53 MG/DL (ref 40–75)
HDLC SERPL: 24.7 % (ref 20–50)
HGB BLD-MCNC: 15.5 G/DL (ref 12–16)
IMM GRANULOCYTES # BLD AUTO: 0.02 K/UL (ref 0–0.04)
IMM GRANULOCYTES NFR BLD AUTO: 0.4 % (ref 0–0.5)
LDLC SERPL CALC-MCNC: 136.2 MG/DL (ref 63–159)
LYMPHOCYTES # BLD AUTO: 1.4 K/UL (ref 1–4.8)
LYMPHOCYTES NFR BLD: 29 % (ref 18–48)
MCH RBC QN AUTO: 29.1 PG (ref 27–31)
MCHC RBC AUTO-ENTMCNC: 32.9 G/DL (ref 32–36)
MCV RBC AUTO: 89 FL (ref 82–98)
MONOCYTES # BLD AUTO: 0.4 K/UL (ref 0.3–1)
MONOCYTES NFR BLD: 8.4 % (ref 4–15)
NEUTROPHILS # BLD AUTO: 2.7 K/UL (ref 1.8–7.7)
NEUTROPHILS NFR BLD: 56.1 % (ref 38–73)
NONHDLC SERPL-MCNC: 162 MG/DL
NRBC BLD-RTO: 0 /100 WBC
PLATELET # BLD AUTO: 317 K/UL (ref 150–450)
PMV BLD AUTO: 11.4 FL (ref 9.2–12.9)
POTASSIUM SERPL-SCNC: 4.7 MMOL/L (ref 3.5–5.1)
PROT SERPL-MCNC: 6.9 G/DL (ref 6–8.4)
RBC # BLD AUTO: 5.32 M/UL (ref 4–5.4)
SODIUM SERPL-SCNC: 145 MMOL/L (ref 136–145)
TRIGL SERPL-MCNC: 129 MG/DL (ref 30–150)
TSH SERPL DL<=0.005 MIU/L-ACNC: 1.08 UIU/ML (ref 0.4–4)
WBC # BLD AUTO: 4.86 K/UL (ref 3.9–12.7)

## 2023-11-06 PROCEDURE — 80061 LIPID PANEL: CPT | Performed by: INTERNAL MEDICINE

## 2023-11-06 PROCEDURE — 80053 COMPREHEN METABOLIC PANEL: CPT | Performed by: INTERNAL MEDICINE

## 2023-11-06 PROCEDURE — 85025 COMPLETE CBC W/AUTO DIFF WBC: CPT | Performed by: INTERNAL MEDICINE

## 2023-11-06 PROCEDURE — 84443 ASSAY THYROID STIM HORMONE: CPT | Performed by: INTERNAL MEDICINE

## 2023-11-06 PROCEDURE — 36415 COLL VENOUS BLD VENIPUNCTURE: CPT | Mod: PO | Performed by: INTERNAL MEDICINE

## 2023-11-07 DIAGNOSIS — M17.0 PRIMARY OSTEOARTHRITIS OF BOTH KNEES: ICD-10-CM

## 2023-11-08 RX ORDER — CELECOXIB 200 MG/1
200 CAPSULE ORAL EVERY MORNING
Qty: 90 CAPSULE | Refills: 1 | Status: SHIPPED | OUTPATIENT
Start: 2023-11-08 | End: 2023-11-13 | Stop reason: SDUPTHER

## 2023-11-08 NOTE — TELEPHONE ENCOUNTER
No care due was identified.  Health [facility] Embedded Care Due Messages. Reference number: [id number].   11/07/2023 10:27:44 PM CST

## 2023-11-13 ENCOUNTER — OFFICE VISIT (OUTPATIENT)
Dept: FAMILY MEDICINE | Facility: CLINIC | Age: 68
End: 2023-11-13
Payer: MEDICARE

## 2023-11-13 VITALS
DIASTOLIC BLOOD PRESSURE: 82 MMHG | WEIGHT: 185.44 LBS | HEIGHT: 68 IN | OXYGEN SATURATION: 98 % | BODY MASS INDEX: 28.1 KG/M2 | HEART RATE: 76 BPM | SYSTOLIC BLOOD PRESSURE: 130 MMHG

## 2023-11-13 DIAGNOSIS — Z12.31 SCREENING MAMMOGRAM FOR BREAST CANCER: ICD-10-CM

## 2023-11-13 DIAGNOSIS — E78.2 MIXED HYPERLIPIDEMIA: ICD-10-CM

## 2023-11-13 DIAGNOSIS — F51.01 PRIMARY INSOMNIA: ICD-10-CM

## 2023-11-13 DIAGNOSIS — Z79.890 HORMONE REPLACEMENT THERAPY (HRT): ICD-10-CM

## 2023-11-13 DIAGNOSIS — F33.42 RECURRENT MAJOR DEPRESSIVE DISORDER, IN FULL REMISSION: Primary | ICD-10-CM

## 2023-11-13 DIAGNOSIS — Z00.00 ANNUAL PHYSICAL EXAM: ICD-10-CM

## 2023-11-13 DIAGNOSIS — I10 PRIMARY HYPERTENSION: ICD-10-CM

## 2023-11-13 DIAGNOSIS — M17.0 PRIMARY OSTEOARTHRITIS OF BOTH KNEES: ICD-10-CM

## 2023-11-13 DIAGNOSIS — E89.0 POSTSURGICAL HYPOTHYROIDISM: ICD-10-CM

## 2023-11-13 DIAGNOSIS — F32.A DEPRESSION, UNSPECIFIED DEPRESSION TYPE: ICD-10-CM

## 2023-11-13 PROBLEM — G47.00 INSOMNIA: Status: ACTIVE | Noted: 2023-11-13

## 2023-11-13 PROCEDURE — 3008F BODY MASS INDEX DOCD: CPT | Mod: CPTII,S$GLB,, | Performed by: INTERNAL MEDICINE

## 2023-11-13 PROCEDURE — 3008F PR BODY MASS INDEX (BMI) DOCUMENTED: ICD-10-PCS | Mod: CPTII,S$GLB,, | Performed by: INTERNAL MEDICINE

## 2023-11-13 PROCEDURE — 1160F PR REVIEW ALL MEDS BY PRESCRIBER/CLIN PHARMACIST DOCUMENTED: ICD-10-PCS | Mod: CPTII,S$GLB,, | Performed by: INTERNAL MEDICINE

## 2023-11-13 PROCEDURE — 99999 PR PBB SHADOW E&M-EST. PATIENT-LVL IV: ICD-10-PCS | Mod: PBBFAC,,, | Performed by: INTERNAL MEDICINE

## 2023-11-13 PROCEDURE — 1125F PR PAIN SEVERITY QUANTIFIED, PAIN PRESENT: ICD-10-PCS | Mod: CPTII,S$GLB,, | Performed by: INTERNAL MEDICINE

## 2023-11-13 PROCEDURE — 3075F PR MOST RECENT SYSTOLIC BLOOD PRESS GE 130-139MM HG: ICD-10-PCS | Mod: CPTII,S$GLB,, | Performed by: INTERNAL MEDICINE

## 2023-11-13 PROCEDURE — 3288F FALL RISK ASSESSMENT DOCD: CPT | Mod: CPTII,S$GLB,, | Performed by: INTERNAL MEDICINE

## 2023-11-13 PROCEDURE — 3079F DIAST BP 80-89 MM HG: CPT | Mod: CPTII,S$GLB,, | Performed by: INTERNAL MEDICINE

## 2023-11-13 PROCEDURE — 1125F AMNT PAIN NOTED PAIN PRSNT: CPT | Mod: CPTII,S$GLB,, | Performed by: INTERNAL MEDICINE

## 2023-11-13 PROCEDURE — 3288F PR FALLS RISK ASSESSMENT DOCUMENTED: ICD-10-PCS | Mod: CPTII,S$GLB,, | Performed by: INTERNAL MEDICINE

## 2023-11-13 PROCEDURE — 1160F RVW MEDS BY RX/DR IN RCRD: CPT | Mod: CPTII,S$GLB,, | Performed by: INTERNAL MEDICINE

## 2023-11-13 PROCEDURE — 1159F PR MEDICATION LIST DOCUMENTED IN MEDICAL RECORD: ICD-10-PCS | Mod: CPTII,S$GLB,, | Performed by: INTERNAL MEDICINE

## 2023-11-13 PROCEDURE — 1101F PR PT FALLS ASSESS DOC 0-1 FALLS W/OUT INJ PAST YR: ICD-10-PCS | Mod: CPTII,S$GLB,, | Performed by: INTERNAL MEDICINE

## 2023-11-13 PROCEDURE — 1159F MED LIST DOCD IN RCRD: CPT | Mod: CPTII,S$GLB,, | Performed by: INTERNAL MEDICINE

## 2023-11-13 PROCEDURE — 99999 PR PBB SHADOW E&M-EST. PATIENT-LVL IV: CPT | Mod: PBBFAC,,, | Performed by: INTERNAL MEDICINE

## 2023-11-13 PROCEDURE — 3075F SYST BP GE 130 - 139MM HG: CPT | Mod: CPTII,S$GLB,, | Performed by: INTERNAL MEDICINE

## 2023-11-13 PROCEDURE — 4010F ACE/ARB THERAPY RXD/TAKEN: CPT | Mod: CPTII,S$GLB,, | Performed by: INTERNAL MEDICINE

## 2023-11-13 PROCEDURE — 4010F PR ACE/ARB THEARPY RXD/TAKEN: ICD-10-PCS | Mod: CPTII,S$GLB,, | Performed by: INTERNAL MEDICINE

## 2023-11-13 PROCEDURE — 3079F PR MOST RECENT DIASTOLIC BLOOD PRESSURE 80-89 MM HG: ICD-10-PCS | Mod: CPTII,S$GLB,, | Performed by: INTERNAL MEDICINE

## 2023-11-13 PROCEDURE — 99214 PR OFFICE/OUTPT VISIT, EST, LEVL IV, 30-39 MIN: ICD-10-PCS | Mod: S$GLB,,, | Performed by: INTERNAL MEDICINE

## 2023-11-13 PROCEDURE — 99214 OFFICE O/P EST MOD 30 MIN: CPT | Mod: S$GLB,,, | Performed by: INTERNAL MEDICINE

## 2023-11-13 PROCEDURE — 1101F PT FALLS ASSESS-DOCD LE1/YR: CPT | Mod: CPTII,S$GLB,, | Performed by: INTERNAL MEDICINE

## 2023-11-13 RX ORDER — DULOXETIN HYDROCHLORIDE 30 MG/1
60 CAPSULE, DELAYED RELEASE ORAL DAILY
Qty: 180 CAPSULE | Refills: 1 | Status: SHIPPED | OUTPATIENT
Start: 2023-11-13 | End: 2024-11-12

## 2023-11-13 RX ORDER — CELECOXIB 200 MG/1
200 CAPSULE ORAL EVERY MORNING
Qty: 90 CAPSULE | Refills: 1 | Status: SHIPPED | OUTPATIENT
Start: 2023-11-13

## 2023-11-13 NOTE — PROGRESS NOTES
Patient ID: Gudelia Cerrato is a 68 y.o. female.    Chief Complaint: Follow-up        Assessment and Plan      1. Recurrent major depressive disorder, in full remission    2. Primary hypertension    3. Postsurgical hypothyroidism    4. Mixed hyperlipidemia    5. Hormone replacement therapy (HRT)    6. Primary insomnia    7. Primary osteoarthritis of both knees  - celecoxib (CELEBREX) 200 MG capsule; Take 1 capsule (200 mg total) by mouth every morning.  Dispense: 90 capsule; Refill: 1    8. Depression, unspecified depression type  - DULoxetine (CYMBALTA) 30 MG capsule; Take 2 capsules (60 mg total) by mouth once daily.  Dispense: 180 capsule; Refill: 1    9. Screening mammogram for breast cancer  - Mammo Digital Screening Bilat w/ [facility]; Future    10. Annual physical exam     Increase Cymbalta   Continue other medications   Mammogram in January HPI     Annual     Medications, recent labs, health maintenance, and diet has been reviewed.    Exercise- walking   Alcohol- occasional   Tobacco- no     She filed for divorce and stress is reduced.     Joint pain improved on celebrex.     1. Recurrent major depressive disorder, in full remission   - duloxetine, worse since divorce    2. Primary hypertension   - controlled    3. Postsurgical hypothyroidism   - stable on levothyroxine   4. Mixed hyperlipidemia   - off zetia due to funds, lipids ok .  Wants to wait on restarting med.   5. Hormone replacement therapy (HRT)   - off hormones. Seeing different gyn this week.    6. Primary insomnia   - stable on trazodone.         Review of Systems   Constitutional:  Negative for fever.   Respiratory:  Negative for shortness of breath.    Cardiovascular:  Negative for chest pain.   Gastrointestinal:  Negative for abdominal pain.   Psychiatric/Behavioral:  Positive for dysphoric mood.         Objective     Vitals:    11/13/23 1336   BP: 130/82   Pulse: 76     Wt Readings from Last 3 Encounters:   11/13/23 1336 84.1 kg (185 lb  6.5 oz)   05/12/23 1056 86.6 kg (190 lb 15.8 oz)   01/23/23 1037 87.2 kg (192 lb 3.9 oz)      Body mass index is 28.19 kg/m².   Physical Exam  Cardiovascular:      Rate and Rhythm: Normal rate and regular rhythm.      Heart sounds: No murmur heard.     No gallop.   Pulmonary:      Breath sounds: Normal breath sounds. No wheezing or rhonchi.   Abdominal:      Palpations: Abdomen is soft.      Tenderness: There is no abdominal tenderness.          Medication List with Changes/Refills   Current Medications    AMLODIPINE-VALSARTAN (EXFORGE) 5-320 MG PER TABLET    Take 1 tablet by mouth once daily.    BUTALBITAL-ACETAMINOPHEN-CAFFEINE -40 MG (FIORICET, ESGIC) -40 MG PER TABLET    Take 1 tablet by mouth every 4 (four) hours as needed.    CALCIUM CRB,CIT/D3/MIN34/NELLY (CITRACAL + BONE DENSITY ORAL)    Citracal + Bone Density    ESTRADIOL (ESTRACE) 0.01 % (0.1 MG/GRAM) VAGINAL CREAM    0.5 grams with applicator or dime-sized amount with finger in vagina nightly x 2 weeks, then twice a week thereafter    LATANOPROST 0.005 % OPHTHALMIC SOLUTION    Apply 1 drop into both eyes at bedtime    LATANOPROST 0.005 % OPHTHALMIC SOLUTION    Apply 1 drop into both eyes at bedtime    LATANOPROST 0.005 % OPHTHALMIC SOLUTION    Instill 1 drop Both Eyes every evening    MULTIVITAMIN/IRON/FOLIC ACID (CENTRUM WOMEN ORAL)    Centrum    SYNTHROID 50 MCG TABLET    Take 1 tablet (50 mcg total) by mouth once daily.    TRAZODONE (DESYREL) 50 MG TABLET    Take 1 tablet (50 mg total) by mouth every evening.   Changed and/or Refilled Medications    Modified Medication Previous Medication    CELECOXIB (CELEBREX) 200 MG CAPSULE celecoxib (CELEBREX) 200 MG capsule       Take 1 capsule (200 mg total) by mouth every morning.    Take 1 capsule (200 mg total) by mouth every morning.    DULOXETINE (CYMBALTA) 30 MG CAPSULE DULoxetine (CYMBALTA) 30 MG capsule       Take 2 capsules (60 mg total) by mouth once daily.    Take 1 capsule (30 mg total)  by mouth once daily.   Discontinued Medications    EZETIMIBE (ZETIA) 10 MG TABLET    Take 1 tablet (10 mg total) by mouth once daily.    GABAPENTIN (NEURONTIN) 300 MG CAPSULE    Take 1 capsule (300 mg total) by mouth 3 (three) times daily.       I personally reviewed past medical, family and social history.    Patient Active Problem List   Diagnosis    Mixed hyperlipidemia    Postsurgical hypothyroidism    Mixed stress and urge urinary incontinence    Primary hypertension    Hormone replacement therapy (HRT)    History of hepatitis C    Overweight (BMI 25.0-29.9)    Osteoarthritis of both knees    Recurrent major depressive disorder, in full remission    Class 1 obesity with serious comorbidity and body mass index (BMI) of 30.0 to 30.9 in adult    Vaginal atrophy    Fecal smearing    Rectal urgency    Cystocele, midline    Weakness    Urinary urgency    Urinary frequency    Dyspnea    Insomnia

## 2023-12-16 DIAGNOSIS — I10 ESSENTIAL HYPERTENSION: ICD-10-CM

## 2023-12-16 RX ORDER — AMLODIPINE AND VALSARTAN 5; 320 MG/1; MG/1
1 TABLET ORAL DAILY
Qty: 90 TABLET | Refills: 3 | Status: SHIPPED | OUTPATIENT
Start: 2023-12-16 | End: 2024-12-10

## 2023-12-16 NOTE — TELEPHONE ENCOUNTER
No care due was identified.  Health [facility] Embedded Care Due Messages. Reference number: [id number].   12/16/2023 5:08:13 AM CST

## 2023-12-16 NOTE — TELEPHONE ENCOUNTER
Refill Decision Note   Gudelia Cerrato  is requesting a refill authorization.  Brief Assessment and Rationale for Refill:  Approve     Medication Therapy Plan:         Comments:     Note composed:7:54 AM 12/16/2023

## 2024-01-12 DIAGNOSIS — F43.0 STRESS REACTION CAUSING MIXED DISTURBANCE OF EMOTION AND CONDUCT: ICD-10-CM

## 2024-01-12 DIAGNOSIS — Z00.00 ENCOUNTER FOR MEDICARE ANNUAL WELLNESS EXAM: ICD-10-CM

## 2024-01-12 DIAGNOSIS — M17.0 PRIMARY OSTEOARTHRITIS OF BOTH KNEES: ICD-10-CM

## 2024-01-12 DIAGNOSIS — F32.A DEPRESSION, UNSPECIFIED DEPRESSION TYPE: ICD-10-CM

## 2024-01-12 RX ORDER — TRAZODONE HYDROCHLORIDE 50 MG/1
TABLET ORAL
Qty: 90 TABLET | Refills: 0 | OUTPATIENT
Start: 2024-01-12

## 2024-01-12 RX ORDER — AMLODIPINE BESYLATE 2.5 MG/1
TABLET ORAL
Qty: 90 TABLET | Refills: 0 | OUTPATIENT
Start: 2024-01-12

## 2024-01-12 RX ORDER — VALSARTAN 320 MG/1
TABLET ORAL
Qty: 90 TABLET | Refills: 0 | OUTPATIENT
Start: 2024-01-12

## 2024-01-12 RX ORDER — DULOXETIN HYDROCHLORIDE 30 MG/1
CAPSULE, DELAYED RELEASE ORAL
Qty: 180 CAPSULE | Refills: 0 | OUTPATIENT
Start: 2024-01-12

## 2024-01-12 RX ORDER — CELECOXIB 200 MG/1
CAPSULE ORAL
Qty: 90 CAPSULE | Refills: 0 | OUTPATIENT
Start: 2024-01-12

## 2024-01-12 NOTE — TELEPHONE ENCOUNTER
No care due was identified.  Health [facility] Embedded Care Due Messages. Reference number: [id number].   1/12/2024 2:48:15 PM CST

## 2024-01-12 NOTE — TELEPHONE ENCOUNTER
Refill Decision Note   Gudelia Cerrato  is requesting a refill authorization.  Brief Assessment and Rationale for Refill:  Quick Discontinue     Medication Therapy Plan:    Pharmacy is requesting new scripts for the following medications without required information, (sig/ frequency/qty/etc)      Medication Reconciliation Completed: No     Comments: Pharmacies have been requesting medications for patients without required information, (sig, frequency, qty, etc.). In addition, requests are sent for medication(s) pt. are currently not taking, and medications patients have never taken.    We have spoken to the pharmacies about these request types and advised their teams previously that we are unable to assess these New Script requests and require all details for these requests. This is a known issue and has been reported.     Note composed:2:49 PM 01/12/2024

## 2024-02-06 DIAGNOSIS — M17.0 PRIMARY OSTEOARTHRITIS OF BOTH KNEES: ICD-10-CM

## 2024-02-06 DIAGNOSIS — F32.A DEPRESSION, UNSPECIFIED DEPRESSION TYPE: ICD-10-CM

## 2024-02-06 RX ORDER — DULOXETIN HYDROCHLORIDE 30 MG/1
CAPSULE, DELAYED RELEASE ORAL
Qty: 777 CAPSULE | Refills: 0 | OUTPATIENT
Start: 2024-02-06

## 2024-02-06 RX ORDER — CELECOXIB 200 MG/1
CAPSULE ORAL
Qty: 777 CAPSULE | Refills: 0 | OUTPATIENT
Start: 2024-02-06

## 2024-02-06 NOTE — TELEPHONE ENCOUNTER
Refill Decision Note   Gudelia Hyacinth Falcon  is requesting a refill authorization.  Brief Assessment and Rationale for Refill:  Quick Discontinue     Medication Therapy Plan: Pharmacy is requesting new scripts for the following medications without required information, (sig/ frequency/qty/etc)     Medication Reconciliation Completed: No   Comments:     No Care Gaps recommended.     Note composed:3:51 PM 02/06/2024

## 2024-02-06 NOTE — TELEPHONE ENCOUNTER
No care due was identified.  Health [facility] Embedded Care Due Messages. Reference number: [id number].   2/06/2024 6:29:56 AM CST

## 2024-02-07 DIAGNOSIS — E11.9 TYPE 2 DIABETES MELLITUS WITHOUT COMPLICATION: ICD-10-CM

## 2024-03-15 DIAGNOSIS — E89.0 POSTSURGICAL HYPOTHYROIDISM: ICD-10-CM

## 2024-03-15 DIAGNOSIS — F43.0 STRESS REACTION CAUSING MIXED DISTURBANCE OF EMOTION AND CONDUCT: ICD-10-CM

## 2024-03-15 RX ORDER — TRAZODONE HYDROCHLORIDE 50 MG/1
50 TABLET ORAL NIGHTLY
Qty: 90 TABLET | Refills: 2 | Status: SHIPPED | OUTPATIENT
Start: 2024-03-15

## 2024-03-15 RX ORDER — LEVOTHYROXINE SODIUM 50 UG/1
50 TABLET ORAL DAILY
Qty: 90 TABLET | Refills: 2 | Status: SHIPPED | OUTPATIENT
Start: 2024-03-15

## 2024-03-15 NOTE — TELEPHONE ENCOUNTER
Refill Decision Note   Gudelia Cerrato  is requesting a refill authorization.  Brief Assessment and Rationale for Refill:  Approve     Medication Therapy Plan:         Comments:     Note composed:11:55 AM 03/15/2024

## 2024-03-15 NOTE — TELEPHONE ENCOUNTER
No care due was identified.  [facility] Embedded Care Due Messages. Reference number: [id number].   3/15/2024 5:08:36 AM CDT

## 2024-03-25 ENCOUNTER — OFFICE VISIT (OUTPATIENT)
Dept: HOME HEALTH SERVICES | Facility: CLINIC | Age: 69
End: 2024-03-25
Payer: MEDICARE

## 2024-03-25 VITALS
DIASTOLIC BLOOD PRESSURE: 87 MMHG | HEIGHT: 68 IN | WEIGHT: 185 LBS | SYSTOLIC BLOOD PRESSURE: 144 MMHG | OXYGEN SATURATION: 98 % | BODY MASS INDEX: 28.04 KG/M2 | HEART RATE: 69 BPM

## 2024-03-25 DIAGNOSIS — H35.033 HYPERTENSIVE RETINOPATHY, BILATERAL: ICD-10-CM

## 2024-03-25 DIAGNOSIS — Z12.31 ENCOUNTER FOR SCREENING MAMMOGRAM FOR MALIGNANT NEOPLASM OF BREAST: ICD-10-CM

## 2024-03-25 DIAGNOSIS — E78.2 MIXED HYPERLIPIDEMIA: ICD-10-CM

## 2024-03-25 DIAGNOSIS — Z00.00 ENCOUNTER FOR PREVENTIVE HEALTH EXAMINATION: ICD-10-CM

## 2024-03-25 DIAGNOSIS — Z79.890 HORMONE REPLACEMENT THERAPY (HRT): ICD-10-CM

## 2024-03-25 DIAGNOSIS — F33.42 RECURRENT MAJOR DEPRESSIVE DISORDER, IN FULL REMISSION: ICD-10-CM

## 2024-03-25 DIAGNOSIS — I10 PRIMARY HYPERTENSION: ICD-10-CM

## 2024-03-25 DIAGNOSIS — Z00.00 ENCOUNTER FOR MEDICARE ANNUAL WELLNESS EXAM: Primary | ICD-10-CM

## 2024-03-25 DIAGNOSIS — E89.0 POSTSURGICAL HYPOTHYROIDISM: ICD-10-CM

## 2024-03-25 DIAGNOSIS — H40.1131 PRIMARY OPEN-ANGLE GLAUCOMA, BILATERAL, MILD STAGE: ICD-10-CM

## 2024-03-25 DIAGNOSIS — E66.3 OVERWEIGHT (BMI 25.0-29.9): ICD-10-CM

## 2024-03-25 DIAGNOSIS — F51.01 PRIMARY INSOMNIA: ICD-10-CM

## 2024-03-25 PROCEDURE — 1101F PT FALLS ASSESS-DOCD LE1/YR: CPT | Mod: CPTII,S$GLB,, | Performed by: NURSE PRACTITIONER

## 2024-03-25 PROCEDURE — G0439 PPPS, SUBSEQ VISIT: HCPCS | Mod: S$GLB,,, | Performed by: NURSE PRACTITIONER

## 2024-03-25 PROCEDURE — 3077F SYST BP >= 140 MM HG: CPT | Mod: CPTII,S$GLB,, | Performed by: NURSE PRACTITIONER

## 2024-03-25 PROCEDURE — 4010F ACE/ARB THERAPY RXD/TAKEN: CPT | Mod: CPTII,S$GLB,, | Performed by: NURSE PRACTITIONER

## 2024-03-25 PROCEDURE — 3288F FALL RISK ASSESSMENT DOCD: CPT | Mod: CPTII,S$GLB,, | Performed by: NURSE PRACTITIONER

## 2024-03-25 PROCEDURE — 1160F RVW MEDS BY RX/DR IN RCRD: CPT | Mod: CPTII,S$GLB,, | Performed by: NURSE PRACTITIONER

## 2024-03-25 PROCEDURE — 1159F MED LIST DOCD IN RCRD: CPT | Mod: CPTII,S$GLB,, | Performed by: NURSE PRACTITIONER

## 2024-03-25 PROCEDURE — 3079F DIAST BP 80-89 MM HG: CPT | Mod: CPTII,S$GLB,, | Performed by: NURSE PRACTITIONER

## 2024-03-28 PROBLEM — E66.811 CLASS 1 OBESITY WITH SERIOUS COMORBIDITY AND BODY MASS INDEX (BMI) OF 30.0 TO 30.9 IN ADULT: Status: RESOLVED | Noted: 2020-12-11 | Resolved: 2024-03-28

## 2024-03-28 PROBLEM — H40.1131 PRIMARY OPEN-ANGLE GLAUCOMA, BILATERAL, MILD STAGE: Status: ACTIVE | Noted: 2024-03-28

## 2024-03-28 PROBLEM — H35.033 HYPERTENSIVE RETINOPATHY, BILATERAL: Status: ACTIVE | Noted: 2024-03-28

## 2024-03-28 PROBLEM — E66.9 CLASS 1 OBESITY WITH SERIOUS COMORBIDITY AND BODY MASS INDEX (BMI) OF 30.0 TO 30.9 IN ADULT: Status: RESOLVED | Noted: 2020-12-11 | Resolved: 2024-03-28

## 2024-03-28 NOTE — PATIENT INSTRUCTIONS
Counseling and Referral of Other Preventative  (Italic type indicates deductible and co-insurance are waived)    Patient Name: Gudelia Cerrato  Today's Date: 3/28/2024    Health Maintenance       Date Due Completion Date    COVID-19 Vaccine (1) Never done ---    RSV Vaccine (Age 60+ and Pregnant patients) (1 - 1-dose 60+ series) Never done ---    Pneumococcal Vaccines (Age 65+) (2 of 2 - PCV) 05/22/2020 5/22/2019    Influenza Vaccine (1) 09/01/2023 12/16/2021    Mammogram 01/18/2024 1/18/2023    Override on 12/30/2016: Done    Override on 11/2/2015: Done    DEXA Scan 09/19/2025 9/19/2022    Hemoglobin A1c (Diabetic Prevention Screening) 11/21/2025 11/21/2022    Colorectal Cancer Screening 06/02/2027 6/2/2022    Override on 11/6/2013: Done (repeat in 5 years)    Lipid Panel 11/06/2028 11/6/2023    TETANUS VACCINE 02/10/2032 2/10/2022        Orders Placed This Encounter   Procedures    Mammo Digital Screening Bilat     The following information is provided to all patients.  This information is to help you find resources for any of the problems found today that may be affecting your health:                  Living healthy guide: www.[facility].[place].gov      Understanding Diabetes: www.diabetes.org      Eating healthy: www.cdc.gov/healthyweight      CDC home safety checklist: www.cdc.gov/steadi/patient.html      Agency on Aging: www.goea.[place].[facility]      Alcoholics anonymous (AA): www.aa.org      Physical Activity: www.hilario.nih.gov/bt1ujxk      Tobacco use: www.quitwithusla.org

## 2024-03-28 NOTE — PROGRESS NOTES
"  Gudelia Cerrato presented for an initial Medicare AWV today. The following components were reviewed and updated:    Medical history  Family History  Social history  Allergies and Current Medications  Health Risk Assessment  Health Maintenance  Care Team    **See Completed Assessments for Annual Wellness visit with in the encounter summary    The following assessments were completed:  Depression Screening  Cognitive function Screening  Timed Get Up Test  Whisper Test      Opioid documentation:      Patient does not have a current opioid prescription.          Vitals:    03/25/24 1043   BP: (!) 144/87   Pulse: 69   SpO2: 98%   Weight: 83.9 kg (185 lb)   Height: 5' 8" (1.727 m)     Body mass index is 28.13 kg/m².       Physical Exam  Constitutional:       Appearance: Normal appearance.   HENT:      Head: Normocephalic and atraumatic.      Nose: Nose normal.      Mouth/Throat:      Mouth: Mucous membranes are moist.   Eyes:      Extraocular Movements: Extraocular movements intact.      Pupils: Pupils are equal, round, and reactive to light.   Cardiovascular:      Rate and Rhythm: Normal rate and regular rhythm.   Pulmonary:      Effort: Pulmonary effort is normal.      Breath sounds: Normal breath sounds.   Abdominal:      General: Bowel sounds are normal.      Palpations: Abdomen is soft.   Musculoskeletal:         General: Normal range of motion.      Cervical back: Normal range of motion and neck supple.   Skin:     General: Skin is warm and dry.   Neurological:      General: No focal deficit present.      Mental Status: She is alert and oriented to person, place, and time.   Psychiatric:         Mood and Affect: Mood normal.         Behavior: Behavior normal.           Diagnoses and health risks identified today and associated recommendations/orders:    1. Encounter for Medicare annual wellness exam  - Ambulatory Referral/Consult to Enhanced Annual Wellness Visit (eAWV)    2. Encounter for preventive health " examination  - awv completed      3. Recurrent major depressive disorder, in full remission  Recent divorce   On medications     4.Encounter for screening mammogram for malignant neoplasm of breast  - Mammo Digital Screening Bilat; Future      5. Mixed hyperlipidemia  Diet and monitored with labs   Last     6. Primary hypertension  On exforge  meds - has been slightly elevated lately - monitor at home       7. Overweight (BMI 25.0-29.9)  Chronic and stable. Continue current treatment. Follow with PCP.  Diet and exercise      8. Hormone replacement therapy (HRT)  On estrogen Replacement  - followed with GYN       9. Postsurgical hypothyroidism  On replacement synthroid - followed with labs       10. Primary insomnia  On trazodone PRN       11. Primary open-angle glaucoma, bilateral, mild stage  Followed with ophthalmology, on drops       12. Hypertensive retinopathy, bilateral  Followed with ophthalmology - monitor BP and keep BP under control     Provided Gudelia with a 5-10 year written screening schedule and personal prevention plan. Recommendations were developed using the USPSTF age appropriate recommendations. Education, counseling, and referrals were provided as needed.  After Visit Summary printed and given to patient which includes a list of additional screenings\tests needed.    Fu in 1 yr for awv            Kate Hoyt NP

## 2024-04-09 ENCOUNTER — HOSPITAL ENCOUNTER (OUTPATIENT)
Dept: RADIOLOGY | Facility: HOSPITAL | Age: 69
Discharge: HOME | End: 2024-04-09
Attending: NURSE PRACTITIONER
Payer: MEDICARE

## 2024-04-09 DIAGNOSIS — Z12.31 ENCOUNTER FOR SCREENING MAMMOGRAM FOR MALIGNANT NEOPLASM OF BREAST: ICD-10-CM

## 2024-04-09 PROCEDURE — 77067 SCR MAMMO BI INCL CAD: CPT | Mod: 26,HCNC,, | Performed by: RADIOLOGY

## 2024-04-09 PROCEDURE — 77063 BREAST TOMOSYNTHESIS BI: CPT | Mod: 26,HCNC,, | Performed by: RADIOLOGY

## 2024-04-09 PROCEDURE — 77067 SCR MAMMO BI INCL CAD: CPT | Mod: TC,HCNC,PO

## 2024-06-20 ENCOUNTER — OFFICE VISIT (OUTPATIENT)
Dept: FAMILY MEDICINE | Facility: CLINIC | Age: 69
End: 2024-06-20
Payer: MEDICARE

## 2024-06-20 VITALS
WEIGHT: 186.31 LBS | OXYGEN SATURATION: 97 % | HEIGHT: 68 IN | DIASTOLIC BLOOD PRESSURE: 84 MMHG | HEART RATE: 70 BPM | SYSTOLIC BLOOD PRESSURE: 134 MMHG | BODY MASS INDEX: 28.24 KG/M2

## 2024-06-20 DIAGNOSIS — I10 PRIMARY HYPERTENSION: ICD-10-CM

## 2024-06-20 DIAGNOSIS — E89.0 POSTSURGICAL HYPOTHYROIDISM: ICD-10-CM

## 2024-06-20 DIAGNOSIS — M17.0 PRIMARY OSTEOARTHRITIS OF BOTH KNEES: ICD-10-CM

## 2024-06-20 DIAGNOSIS — F51.01 PRIMARY INSOMNIA: ICD-10-CM

## 2024-06-20 DIAGNOSIS — R73.9 HYPERGLYCEMIA: Primary | ICD-10-CM

## 2024-06-20 DIAGNOSIS — I10 ESSENTIAL HYPERTENSION: ICD-10-CM

## 2024-06-20 DIAGNOSIS — E78.2 MIXED HYPERLIPIDEMIA: ICD-10-CM

## 2024-06-20 PROCEDURE — 1159F MED LIST DOCD IN RCRD: CPT | Mod: HCNC,CPTII,S$GLB, | Performed by: INTERNAL MEDICINE

## 2024-06-20 PROCEDURE — 3075F SYST BP GE 130 - 139MM HG: CPT | Mod: HCNC,CPTII,S$GLB, | Performed by: INTERNAL MEDICINE

## 2024-06-20 PROCEDURE — 99999 PR PBB SHADOW E&M-EST. PATIENT-LVL III: CPT | Mod: PBBFAC,HCNC,, | Performed by: INTERNAL MEDICINE

## 2024-06-20 PROCEDURE — 1125F AMNT PAIN NOTED PAIN PRSNT: CPT | Mod: HCNC,CPTII,S$GLB, | Performed by: INTERNAL MEDICINE

## 2024-06-20 PROCEDURE — 3288F FALL RISK ASSESSMENT DOCD: CPT | Mod: HCNC,CPTII,S$GLB, | Performed by: INTERNAL MEDICINE

## 2024-06-20 PROCEDURE — 4010F ACE/ARB THERAPY RXD/TAKEN: CPT | Mod: HCNC,CPTII,S$GLB, | Performed by: INTERNAL MEDICINE

## 2024-06-20 PROCEDURE — 99214 OFFICE O/P EST MOD 30 MIN: CPT | Mod: HCNC,S$GLB,, | Performed by: INTERNAL MEDICINE

## 2024-06-20 PROCEDURE — 3079F DIAST BP 80-89 MM HG: CPT | Mod: HCNC,CPTII,S$GLB, | Performed by: INTERNAL MEDICINE

## 2024-06-20 PROCEDURE — 1101F PT FALLS ASSESS-DOCD LE1/YR: CPT | Mod: HCNC,CPTII,S$GLB, | Performed by: INTERNAL MEDICINE

## 2024-06-20 PROCEDURE — 1160F RVW MEDS BY RX/DR IN RCRD: CPT | Mod: HCNC,CPTII,S$GLB, | Performed by: INTERNAL MEDICINE

## 2024-06-20 PROCEDURE — 3008F BODY MASS INDEX DOCD: CPT | Mod: HCNC,CPTII,S$GLB, | Performed by: INTERNAL MEDICINE

## 2024-06-20 RX ORDER — DULOXETIN HYDROCHLORIDE 30 MG/1
30 CAPSULE, DELAYED RELEASE ORAL DAILY
COMMUNITY

## 2024-06-20 NOTE — PROGRESS NOTES
Patient ID: Gudelia Cerrato is a 68 y.o. female.    Chief Complaint: Follow-up    Six-month follow-up    Problem  HPI / physical exam Plan   Hypertension Not checking at home  Will start checking at home. Continue exforge.    Hyperlipidemia Last lipids ok. Stopped zetia due cramps. Also had cramping with pravastatin  Monitor lipids    Depression Cymbalta increase helped but now back to 30 mg. Stable  Continue cymbalta 30 mg.    Hypothyroidism Last TSH 1.08 in nov.  Continue levothyroxine.    HRT Taking estradiol 0.5 mg daily. Dr. Funes.  Follow up OBGYN   Insomnia Trazodone helps. Sleeps soundly for 4 out of 7 hours Continue trazodone    Bilateral knee pain  Stable. Takes celebrex prn (5 days/ week)  Continue celebrex. Monitor renal q 6 mo    Glaucoma  Latanoprost  Follow up ophthalmo        Medications, recent labs, health maintenance, and diet has been reviewed.    Exercise- walking   Alcohol- occasional   Tobacco- none            Diagnoses addressed and related orders     1. Hyperglycemia  - Hemoglobin A1C; Future    2. Mixed hyperlipidemia  - Lipid Panel; Future    3. Primary insomnia    4. Primary hypertension  - CBC Auto Differential; Future  - Comprehensive Metabolic Panel; Future    5. Postsurgical hypothyroidism  - TSH; Future    6. Primary osteoarthritis of both knees    7. Essential hypertension          Review of Systems   Constitutional:  Negative for fever.   Respiratory:  Negative for shortness of breath.    Cardiovascular:  Negative for chest pain.   Gastrointestinal:  Negative for abdominal pain.     Vitals:    06/20/24 1354   BP: 138/86   Pulse: 70      Wt Readings from Last 3 Encounters:   06/20/24 1354 84.5 kg (186 lb 4.6 oz)   03/25/24 1043 83.9 kg (185 lb)   11/13/23 1336 84.1 kg (185 lb 6.5 oz)      Body mass index is 28.33 kg/m².     Physical Exam  Cardiovascular:      Rate and Rhythm: Normal rate and regular rhythm.      Heart sounds: No murmur heard.     No gallop.   Pulmonary:       Breath sounds: Normal breath sounds. No wheezing or rhonchi.   Abdominal:      Palpations: Abdomen is soft.      Tenderness: There is no abdominal tenderness.            Hypertension Medications               amlodipine-valsartan (EXFORGE) 5-320 mg per tablet Take 1 tablet by mouth once daily.              Medication List with Changes/Refills   Current Medications    AMLODIPINE-VALSARTAN (EXFORGE) 5-320 MG PER TABLET    Take 1 tablet by mouth once daily.    BUTALBITAL-ACETAMINOPHEN-CAFFEINE -40 MG (FIORICET, ESGIC) -40 MG PER TABLET    Take 1 tablet by mouth every 4 (four) hours as needed.    CELECOXIB (CELEBREX) 200 MG CAPSULE    Take 1 capsule (200 mg total) by mouth every morning.    DULOXETINE (CYMBALTA) 30 MG CAPSULE    Take 30 mg by mouth once daily.    ESTRADIOL (ESTRACE) 1 MG TABLET    Take 0.5 (half) tablet by mouth daily    LATANOPROST 0.005 % OPHTHALMIC SOLUTION    Instill 1 drop Both Eyes every evening    MULTIVITAMIN/IRON/FOLIC ACID (CENTRUM WOMEN ORAL)    Centrum    SYNTHROID 50 MCG TABLET    Take 1 tablet (50 mcg total) by mouth once daily.    TRAZODONE (DESYREL) 50 MG TABLET    Take 1 tablet (50 mg total) by mouth every evening.   Discontinued Medications    DULOXETINE (CYMBALTA) 30 MG CAPSULE    Take 2 capsules (60 mg total) by mouth once daily.       I personally reviewed past medical, family and social history.

## 2024-07-28 ENCOUNTER — PATIENT MESSAGE (OUTPATIENT)
Dept: FAMILY MEDICINE | Facility: CLINIC | Age: 69
End: 2024-07-28
Payer: MEDICARE

## 2024-07-29 RX ORDER — DULOXETIN HYDROCHLORIDE 30 MG/1
30 CAPSULE, DELAYED RELEASE ORAL DAILY
Qty: 90 CAPSULE | Refills: 3 | Status: SHIPPED | OUTPATIENT
Start: 2024-07-29

## 2024-07-30 NOTE — TELEPHONE ENCOUNTER
No care due was identified.  Health [facility] Embedded Care Due Messages. Reference number: [id number].   7/29/2024 7:06:22 PM CDT

## 2024-10-26 DIAGNOSIS — M17.0 PRIMARY OSTEOARTHRITIS OF BOTH KNEES: ICD-10-CM

## 2024-10-28 RX ORDER — CELECOXIB 200 MG/1
200 CAPSULE ORAL EVERY MORNING
Qty: 90 CAPSULE | Refills: 1 | Status: SHIPPED | OUTPATIENT
Start: 2024-10-28

## 2024-12-02 ENCOUNTER — LAB VISIT (OUTPATIENT)
Dept: LAB | Facility: HOSPITAL | Age: 69
End: 2024-12-02
Attending: INTERNAL MEDICINE
Payer: MEDICARE

## 2024-12-02 DIAGNOSIS — I10 PRIMARY HYPERTENSION: ICD-10-CM

## 2024-12-02 DIAGNOSIS — E89.0 POSTSURGICAL HYPOTHYROIDISM: ICD-10-CM

## 2024-12-02 DIAGNOSIS — R73.9 HYPERGLYCEMIA: ICD-10-CM

## 2024-12-02 DIAGNOSIS — E78.2 MIXED HYPERLIPIDEMIA: ICD-10-CM

## 2024-12-02 LAB
ALBUMIN SERPL BCP-MCNC: 3.9 G/DL (ref 3.5–5.2)
ALP SERPL-CCNC: 109 U/L (ref 40–150)
ALT SERPL W/O P-5'-P-CCNC: 23 U/L (ref 10–44)
ANION GAP SERPL CALC-SCNC: 8 MMOL/L (ref 8–16)
AST SERPL-CCNC: 27 U/L (ref 10–40)
BASOPHILS # BLD AUTO: 0.04 K/UL (ref 0–0.2)
BASOPHILS NFR BLD: 0.9 % (ref 0–1.9)
BILIRUB SERPL-MCNC: 1.1 MG/DL (ref 0.1–1)
BUN SERPL-MCNC: 9 MG/DL (ref 8–23)
CALCIUM SERPL-MCNC: 9.2 MG/DL (ref 8.7–10.5)
CHLORIDE SERPL-SCNC: 107 MMOL/L (ref 95–110)
CHOLEST SERPL-MCNC: 225 MG/DL (ref 120–199)
CHOLEST/HDLC SERPL: 3.6 {RATIO} (ref 2–5)
CO2 SERPL-SCNC: 26 MMOL/L (ref 23–29)
CREAT SERPL-MCNC: 0.8 MG/DL (ref 0.5–1.4)
DIFFERENTIAL METHOD BLD: NORMAL
EOSINOPHIL # BLD AUTO: 0.2 K/UL (ref 0–0.5)
EOSINOPHIL NFR BLD: 3.7 % (ref 0–8)
ERYTHROCYTE [DISTWIDTH] IN BLOOD BY AUTOMATED COUNT: 12.9 % (ref 11.5–14.5)
EST. GFR  (NO RACE VARIABLE): >60 ML/MIN/1.73 M^2
ESTIMATED AVG GLUCOSE: 100 MG/DL (ref 68–131)
GLUCOSE SERPL-MCNC: 110 MG/DL (ref 70–110)
HBA1C MFR BLD: 5.1 % (ref 4–5.6)
HCT VFR BLD AUTO: 48 % (ref 37–48.5)
HDLC SERPL-MCNC: 62 MG/DL (ref 40–75)
HDLC SERPL: 27.6 % (ref 20–50)
HGB BLD-MCNC: 15.9 G/DL (ref 12–16)
IMM GRANULOCYTES # BLD AUTO: 0.01 K/UL (ref 0–0.04)
IMM GRANULOCYTES NFR BLD AUTO: 0.2 % (ref 0–0.5)
LDLC SERPL CALC-MCNC: 139.8 MG/DL (ref 63–159)
LYMPHOCYTES # BLD AUTO: 1.3 K/UL (ref 1–4.8)
LYMPHOCYTES NFR BLD: 31 % (ref 18–48)
MCH RBC QN AUTO: 29.9 PG (ref 27–31)
MCHC RBC AUTO-ENTMCNC: 33.1 G/DL (ref 32–36)
MCV RBC AUTO: 90 FL (ref 82–98)
MONOCYTES # BLD AUTO: 0.4 K/UL (ref 0.3–1)
MONOCYTES NFR BLD: 9 % (ref 4–15)
NEUTROPHILS # BLD AUTO: 2.4 K/UL (ref 1.8–7.7)
NEUTROPHILS NFR BLD: 55.2 % (ref 38–73)
NONHDLC SERPL-MCNC: 163 MG/DL
NRBC BLD-RTO: 0 /100 WBC
PLATELET # BLD AUTO: 258 K/UL (ref 150–450)
PMV BLD AUTO: 11.5 FL (ref 9.2–12.9)
POTASSIUM SERPL-SCNC: 4.7 MMOL/L (ref 3.5–5.1)
PROT SERPL-MCNC: 6.9 G/DL (ref 6–8.4)
RBC # BLD AUTO: 5.32 M/UL (ref 4–5.4)
SODIUM SERPL-SCNC: 141 MMOL/L (ref 136–145)
TRIGL SERPL-MCNC: 116 MG/DL (ref 30–150)
TSH SERPL DL<=0.005 MIU/L-ACNC: 1.3 UIU/ML (ref 0.4–4)
WBC # BLD AUTO: 4.32 K/UL (ref 3.9–12.7)

## 2024-12-02 PROCEDURE — 80061 LIPID PANEL: CPT | Mod: HCNC | Performed by: INTERNAL MEDICINE

## 2024-12-02 PROCEDURE — 84443 ASSAY THYROID STIM HORMONE: CPT | Mod: HCNC | Performed by: INTERNAL MEDICINE

## 2024-12-02 PROCEDURE — 36415 COLL VENOUS BLD VENIPUNCTURE: CPT | Mod: HCNC,PO | Performed by: INTERNAL MEDICINE

## 2024-12-02 PROCEDURE — 85025 COMPLETE CBC W/AUTO DIFF WBC: CPT | Mod: HCNC | Performed by: INTERNAL MEDICINE

## 2024-12-02 PROCEDURE — 80053 COMPREHEN METABOLIC PANEL: CPT | Mod: HCNC | Performed by: INTERNAL MEDICINE

## 2024-12-02 PROCEDURE — 83036 HEMOGLOBIN GLYCOSYLATED A1C: CPT | Mod: HCNC | Performed by: INTERNAL MEDICINE

## 2024-12-04 ENCOUNTER — OFFICE VISIT (OUTPATIENT)
Dept: FAMILY MEDICINE | Facility: CLINIC | Age: 69
End: 2024-12-04
Payer: MEDICARE

## 2024-12-04 VITALS
OXYGEN SATURATION: 98 % | BODY MASS INDEX: 29.51 KG/M2 | HEART RATE: 80 BPM | DIASTOLIC BLOOD PRESSURE: 82 MMHG | HEIGHT: 68 IN | WEIGHT: 194.69 LBS | SYSTOLIC BLOOD PRESSURE: 128 MMHG

## 2024-12-04 DIAGNOSIS — Z79.890 HORMONE REPLACEMENT THERAPY (HRT): ICD-10-CM

## 2024-12-04 DIAGNOSIS — F51.01 PRIMARY INSOMNIA: Primary | ICD-10-CM

## 2024-12-04 DIAGNOSIS — E78.2 MIXED HYPERLIPIDEMIA: ICD-10-CM

## 2024-12-04 DIAGNOSIS — F33.42 RECURRENT MAJOR DEPRESSIVE DISORDER, IN FULL REMISSION: ICD-10-CM

## 2024-12-04 DIAGNOSIS — E89.0 POSTSURGICAL HYPOTHYROIDISM: ICD-10-CM

## 2024-12-04 DIAGNOSIS — I10 PRIMARY HYPERTENSION: ICD-10-CM

## 2024-12-04 PROCEDURE — 1101F PT FALLS ASSESS-DOCD LE1/YR: CPT | Mod: HCNC,CPTII,S$GLB, | Performed by: INTERNAL MEDICINE

## 2024-12-04 PROCEDURE — G2211 COMPLEX E/M VISIT ADD ON: HCPCS | Mod: HCNC,S$GLB,, | Performed by: INTERNAL MEDICINE

## 2024-12-04 PROCEDURE — 3079F DIAST BP 80-89 MM HG: CPT | Mod: HCNC,CPTII,S$GLB, | Performed by: INTERNAL MEDICINE

## 2024-12-04 PROCEDURE — 3008F BODY MASS INDEX DOCD: CPT | Mod: HCNC,CPTII,S$GLB, | Performed by: INTERNAL MEDICINE

## 2024-12-04 PROCEDURE — 3044F HG A1C LEVEL LT 7.0%: CPT | Mod: HCNC,CPTII,S$GLB, | Performed by: INTERNAL MEDICINE

## 2024-12-04 PROCEDURE — 1125F AMNT PAIN NOTED PAIN PRSNT: CPT | Mod: HCNC,CPTII,S$GLB, | Performed by: INTERNAL MEDICINE

## 2024-12-04 PROCEDURE — 1160F RVW MEDS BY RX/DR IN RCRD: CPT | Mod: HCNC,CPTII,S$GLB, | Performed by: INTERNAL MEDICINE

## 2024-12-04 PROCEDURE — 3074F SYST BP LT 130 MM HG: CPT | Mod: HCNC,CPTII,S$GLB, | Performed by: INTERNAL MEDICINE

## 2024-12-04 PROCEDURE — 4010F ACE/ARB THERAPY RXD/TAKEN: CPT | Mod: HCNC,CPTII,S$GLB, | Performed by: INTERNAL MEDICINE

## 2024-12-04 PROCEDURE — 99214 OFFICE O/P EST MOD 30 MIN: CPT | Mod: HCNC,S$GLB,, | Performed by: INTERNAL MEDICINE

## 2024-12-04 PROCEDURE — 3288F FALL RISK ASSESSMENT DOCD: CPT | Mod: HCNC,CPTII,S$GLB, | Performed by: INTERNAL MEDICINE

## 2024-12-04 PROCEDURE — 99999 PR PBB SHADOW E&M-EST. PATIENT-LVL IV: CPT | Mod: PBBFAC,HCNC,, | Performed by: INTERNAL MEDICINE

## 2024-12-04 PROCEDURE — 1159F MED LIST DOCD IN RCRD: CPT | Mod: HCNC,CPTII,S$GLB, | Performed by: INTERNAL MEDICINE

## 2024-12-04 NOTE — PROGRESS NOTES
Patient ID: Gudelia Cerrato is a 69 y.o. female.    Chief Complaint: Annual Exam     Assessment and Plan     1. Hormone replacement therapy (HRT)    2. Primary insomnia    3. Mixed hyperlipidemia    4. Postsurgical hypothyroidism    5. Primary hypertension    6. Recurrent major depressive disorder, in full remission       Assessment & Plan    IMPRESSION:  - Blood pressure stable and controlled with current medication regimen  - 2022 echocardiogram and nuclear stress test normal; no cardiac concerns noted  - Shortness of breath likely due to deconditioning rather than cardiac etiology  - Recent labs show good A1C control, slightly elevated total cholesterol, and mildly elevated bilirubin  - Liver function tests otherwise normal; continued monitoring of bilirubin recommended  - Current symptoms likely allergy-related; no indications of flu or COVID-19    PLAN SUMMARY:  - Continue Synthroid 50 mcg daily for hypothyroidism  - Continue amlodipine/valsartan 5/320 mg for blood pressure control  - Continue Trazodone nightly for insomnia  - Continue glaucoma eye drops as prescribed  - Continue Celebrex 200 mg 3-4 times weekly as needed for chronic knee pain  - Continue estradiol 0.5 mg daily (prescribed by external gynecologist)  - Continue Cymbalta 30 mg daily for anxiety and depression  - Reduce carbohydrate intake and implement exercise program  - Follow up in 6 months  - Review after visit summary on patient portal    PATIENT EDUCATION:  - Discussed importance of weight loss for liver health  - Explained bilirubin elevation and its potential causes    ACTION ITEMS/LIFESTYLE:  - Gudelia to reduce carbohydrate intake  - Gudelia to implement exercise program with new roommate  - Gudelia to continue weight loss efforts         No follow-ups on file.   HPI     History of Present Illness    CHIEF COMPLAINT:  Gudelia presents for a routine follow-up visit to discuss blood pressure management and review recent lab  results.    HPI:  Gudelia reports blood pressure readings in the 120s-130s systolic over less than 80 diastolic. She has not downloaded a blood pressure tracking les due to preoccupation with her recent divorce.    She reports chronic shortness of breath, particularly during physical activity, needing to rest for a few minutes after treadmill use at the gym. She can perform activities despite this limitation.    Gudelia had a dry cough over the Thanksgiving weekend, which she attributes to allergies. The cough does not originate from her chest. She manages this with Zyrtec and occasional Benadryl use. Oregano tea has been effective in alleviating her cough.    She expresses anxiety about her current medications and denies chest pain. Previous tests have shown normal heart function.      ROS:  General: -fatigue, -weight loss  Eyes: -vision changes  Cardiovascular: -chest pain  Respiratory: +cough, +shortness of breath  Psychiatric: +anxiety, +depression         Review of Systems    I personally reviewed past medical, family and social history.     Objective    Vitals:    12/04/24 1346   BP: 128/82   Pulse: 80      Wt Readings from Last 3 Encounters:   12/04/24 1346 88.3 kg (194 lb 10.7 oz)   06/20/24 1354 84.5 kg (186 lb 4.6 oz)   03/25/24 1043 83.9 kg (185 lb)      Body mass index is 29.6 kg/m².     Physical Exam    Vitals: Weight: 194 lbs.  Extremities: No lower extremity edema.     Physical Exam  Cardiovascular:      Rate and Rhythm: Normal rate and regular rhythm.      Heart sounds: No murmur heard.     No gallop.   Pulmonary:      Breath sounds: Normal breath sounds. No wheezing or rhonchi.   Abdominal:      Palpations: Abdomen is soft.      Tenderness: There is no abdominal tenderness.         Reference     : Visit today included increased complexity associated with the care of the episodic problem Primary insomnia [F51.01] addressed and managing the longitudinal care of the patient due to the serious and/or  complex managed problem(s)     Active Problem List with Overview Notes    Diagnosis Date Noted    Insomnia 11/13/2023    Recurrent major depressive disorder, in full remission 12/11/2019    Hormone replacement therapy (HRT)      Treated by Dr. Aaliyah Bearden      Primary hypertension 05/06/2015    Mixed hyperlipidemia     Postsurgical hypothyroidism     Primary open-angle glaucoma, bilateral, mild stage 03/28/2024    Hypertensive retinopathy, bilateral 03/28/2024    Dyspnea 07/21/2022    Weakness 03/22/2022    Urinary urgency 03/22/2022    Urinary frequency 03/22/2022    Vaginal atrophy 03/02/2022    Fecal smearing 03/02/2022    Rectal urgency 03/02/2022    Cystocele, midline 03/02/2022    Overweight (BMI 25.0-29.9) 11/20/2018    Osteoarthritis of both knees 11/20/2018    History of hepatitis C 10/10/2018    Mixed stress and urge urinary incontinence            Hypertension Medications               amlodipine-valsartan (EXFORGE) 5-320 mg per tablet Take 1 tablet by mouth once daily.              Medication List with Changes/Refills   Current Medications    AMLODIPINE-VALSARTAN (EXFORGE) 5-320 MG PER TABLET    Take 1 tablet by mouth once daily.    CELECOXIB (CELEBREX) 200 MG CAPSULE    Take 1 capsule (200 mg total) by mouth every morning.    DULOXETINE (CYMBALTA) 30 MG CAPSULE    Take 1 capsule (30 mg total) by mouth once daily.    ESTRADIOL (ESTRACE) 1 MG TABLET    Take 0.5 (half) tablet by mouth daily    LATANOPROST 0.005 % OPHTHALMIC SOLUTION    Instill 1 drop Both Eyes every evening    MULTIVITAMIN/IRON/FOLIC ACID (CENTRUM WOMEN ORAL)    Centrum    SYNTHROID 50 MCG TABLET    Take 1 tablet (50 mcg total) by mouth once daily.    TRAZODONE (DESYREL) 50 MG TABLET    Take 1 tablet (50 mg total) by mouth every evening.   Discontinued Medications    BUTALBITAL-ACETAMINOPHEN-CAFFEINE -40 MG (FIORICET, ESGIC) -40 MG PER TABLET    Take 1 tablet by mouth every 4 (four) hours as needed.         This note was  generated with the assistance of ambient listening technology. Verbal consent was obtained by the patient and accompanying visitor(s) for the recording of patient appointment to facilitate this note. I attest to having reviewed and edited the generated note for accuracy, though some syntax or spelling errors may persist. Please contact the author of this note for any clarification.

## 2025-01-16 DIAGNOSIS — E89.0 POSTSURGICAL HYPOTHYROIDISM: ICD-10-CM

## 2025-01-16 DIAGNOSIS — I10 ESSENTIAL HYPERTENSION: ICD-10-CM

## 2025-01-16 RX ORDER — LEVOTHYROXINE SODIUM 50 UG/1
50 TABLET ORAL DAILY
Qty: 90 TABLET | Refills: 3 | Status: SHIPPED | OUTPATIENT
Start: 2025-01-16

## 2025-01-16 RX ORDER — AMLODIPINE AND VALSARTAN 5; 320 MG/1; MG/1
1 TABLET ORAL DAILY
Qty: 90 TABLET | Refills: 3 | Status: SHIPPED | OUTPATIENT
Start: 2025-01-16 | End: 2026-01-11

## 2025-01-16 NOTE — TELEPHONE ENCOUNTER
No care due was identified.  Health [facility] Embedded Care Due Messages. Reference number: [id number].   1/16/2025 5:08:10 AM CST

## 2025-01-16 NOTE — TELEPHONE ENCOUNTER
Refill Decision Note   Gudelia Cerrato  is requesting a refill authorization.  Brief Assessment and Rationale for Refill:  Approve     Medication Therapy Plan:         Comments:     Note composed:11:07 AM 01/16/2025

## 2025-01-17 DIAGNOSIS — F43.0 STRESS REACTION CAUSING MIXED DISTURBANCE OF EMOTION AND CONDUCT: ICD-10-CM

## 2025-01-17 RX ORDER — TRAZODONE HYDROCHLORIDE 50 MG/1
50 TABLET ORAL NIGHTLY
Qty: 90 TABLET | Refills: 3 | Status: SHIPPED | OUTPATIENT
Start: 2025-01-17

## 2025-01-17 NOTE — TELEPHONE ENCOUNTER
No care due was identified.  Health [facility] Embedded Care Due Messages. Reference number: [id number].   1/17/2025 5:08:10 AM CST

## 2025-01-17 NOTE — TELEPHONE ENCOUNTER
Refill Decision Note   Gudelia Cerrato  is requesting a refill authorization.  Brief Assessment and Rationale for Refill:  Approve     Medication Therapy Plan:        Comments:     Note composed:7:52 AM 01/17/2025

## 2025-02-24 DIAGNOSIS — Z00.00 ENCOUNTER FOR MEDICARE ANNUAL WELLNESS EXAM: ICD-10-CM

## 2025-04-22 DIAGNOSIS — M17.0 PRIMARY OSTEOARTHRITIS OF BOTH KNEES: ICD-10-CM

## 2025-04-22 RX ORDER — CELECOXIB 200 MG/1
200 CAPSULE ORAL EVERY MORNING
Qty: 90 CAPSULE | Refills: 1 | Status: SHIPPED | OUTPATIENT
Start: 2025-04-22

## 2025-04-22 NOTE — TELEPHONE ENCOUNTER
No care due was identified.  Health [facility] Embedded Care Due Messages. Reference number: [id number].   4/22/2025 4:06:10 AM CDT

## 2025-06-04 ENCOUNTER — HOSPITAL ENCOUNTER (OUTPATIENT)
Dept: RADIOLOGY | Facility: HOSPITAL | Age: 70
Discharge: HOME | End: 2025-06-04
Attending: INTERNAL MEDICINE
Payer: MEDICARE

## 2025-06-04 ENCOUNTER — OFFICE VISIT (OUTPATIENT)
Dept: FAMILY MEDICINE | Facility: CLINIC | Age: 70
End: 2025-06-04
Payer: MEDICARE

## 2025-06-04 ENCOUNTER — RESULTS FOLLOW-UP (OUTPATIENT)
Dept: FAMILY MEDICINE | Facility: CLINIC | Age: 70
End: 2025-06-04

## 2025-06-04 VITALS
BODY MASS INDEX: 28.5 KG/M2 | SYSTOLIC BLOOD PRESSURE: 124 MMHG | HEART RATE: 80 BPM | WEIGHT: 188.06 LBS | HEIGHT: 68 IN | OXYGEN SATURATION: 96 % | DIASTOLIC BLOOD PRESSURE: 78 MMHG

## 2025-06-04 DIAGNOSIS — R42 VERTIGO: ICD-10-CM

## 2025-06-04 DIAGNOSIS — E89.0 POSTSURGICAL HYPOTHYROIDISM: ICD-10-CM

## 2025-06-04 DIAGNOSIS — E78.2 MIXED HYPERLIPIDEMIA: Primary | ICD-10-CM

## 2025-06-04 DIAGNOSIS — F51.01 PRIMARY INSOMNIA: ICD-10-CM

## 2025-06-04 DIAGNOSIS — R73.9 HYPERGLYCEMIA: ICD-10-CM

## 2025-06-04 DIAGNOSIS — Z12.83 SKIN CANCER SCREENING: ICD-10-CM

## 2025-06-04 DIAGNOSIS — Z12.31 ENCOUNTER FOR SCREENING MAMMOGRAM FOR BREAST CANCER: ICD-10-CM

## 2025-06-04 DIAGNOSIS — I10 PRIMARY HYPERTENSION: ICD-10-CM

## 2025-06-04 PROCEDURE — 99999 PR PBB SHADOW E&M-EST. PATIENT-LVL V: CPT | Mod: PBBFAC,,, | Performed by: INTERNAL MEDICINE

## 2025-06-04 PROCEDURE — 77067 SCR MAMMO BI INCL CAD: CPT | Mod: TC,PO

## 2025-06-04 PROCEDURE — 77067 SCR MAMMO BI INCL CAD: CPT | Mod: 26,,, | Performed by: RADIOLOGY

## 2025-06-04 PROCEDURE — 77063 BREAST TOMOSYNTHESIS BI: CPT | Mod: 26,,, | Performed by: RADIOLOGY

## 2025-06-25 ENCOUNTER — OFFICE VISIT (OUTPATIENT)
Dept: OTOLARYNGOLOGY | Facility: CLINIC | Age: 70
End: 2025-06-25
Payer: MEDICARE

## 2025-06-25 VITALS
DIASTOLIC BLOOD PRESSURE: 80 MMHG | BODY MASS INDEX: 29.06 KG/M2 | SYSTOLIC BLOOD PRESSURE: 135 MMHG | WEIGHT: 191.13 LBS

## 2025-06-25 DIAGNOSIS — R42 DYSEQUILIBRIUM: Primary | ICD-10-CM

## 2025-06-25 DIAGNOSIS — H61.22 IMPACTED CERUMEN OF LEFT EAR: ICD-10-CM

## 2025-06-25 PROCEDURE — 99999 PR PBB SHADOW E&M-EST. PATIENT-LVL IV: CPT | Mod: PBBFAC,HCNC,, | Performed by: NURSE PRACTITIONER

## 2025-06-25 NOTE — PATIENT INSTRUCTIONS
"Regarding dysequilibrium versus true vertigo: a VNG is an inner ear test as part of comprehensive vestibular system diagnostic testing done in either [facility] or [facility], and indicated for those patients experiencing true vertigo but it is of little to no yield unless vertiginous. No evidence of any BPPV ("loose crystals") at this time. Whereas dysequilibrium is typically multifactorial: anemia, anxiety, dehydration, hypo-/hyper-glycemia, hypo-/hyper-tension, thyroid, arrhthymia, side effects of medication, gait/mobility issues, age, headaches and other neurological issues, etc. Discuss with your PCP to determine stages of workup. Call ENT for any vertigo or if you desire to proceed with VNG (inner ear test).     "

## 2025-06-25 NOTE — PROGRESS NOTES
Subjective     Patient ID: Gudelia Cerrato is a 69 y.o. female.    Chief Complaint: Ear Fullness (Pt states when she is Laying on the L side she can feel her ear popping and it gets fullness to it) and Dizziness    HPI  Patient is new to ENT, referred by Dr. Norwood for consultation for vertigo.   Patient had a syncopal episode at home on 03/27/2025 resulting in laceration above her right eye. Suspected vasovagal etiology. Followed up with her PCP 3 weeks ago for vertigo, and was referred to ENT.   Patient states she had a syncopal episode 15 years ago and then subsequently underwent anterior approach C-spine surgery. She experienced some vertigo after the fall 15 years ago, which resolved spontaneously. Patient states 5 years ago, her grandchildren were playing in the dark with some brightly flashing, blinking [employer] [place] toys, and it caused her to have vertigo. She had to ask her grandchildren to turn on the lights and stop playing with flashing lights. Patient states that vertigo returned after her latest fall in March. She states she normally sleeps on her right side but was unable to due to right eye laceration, but states sometimes she would inadvertently roll onto her right while asleep and become queasy, but not really true spinning. Patient also reports both ears have been popping, L>R, with head tilt.      Review of Systems   Constitutional: Negative.    HENT: Negative.     Eyes: Negative.    Respiratory: Negative.     Cardiovascular: Negative.    Gastrointestinal: Negative.    Musculoskeletal: Negative.    Integumentary:  Negative.   Neurological: Negative.    Hematological: Negative.    Psychiatric/Behavioral: Negative.            Objective     Physical Exam  Vitals and nursing note reviewed.   Constitutional:       General: She is not in acute distress.     Appearance: She is well-developed. She is not ill-appearing.   HENT:      Head: Normocephalic and atraumatic.      Right Ear: Hearing, tympanic  membrane, ear canal and external ear normal. No middle ear effusion. Tympanic membrane is not erythematous.      Left Ear: Hearing, tympanic membrane, ear canal and external ear normal.  No middle ear effusion. Tympanic membrane is not erythematous.      Nose: Nose normal.   Eyes:      General: Lids are normal. No scleral icterus.        Right eye: No discharge.         Left eye: No discharge.   Neck:      Trachea: Trachea normal. No tracheal deviation.   Cardiovascular:      Rate and Rhythm: Normal rate.   Pulmonary:      Effort: Pulmonary effort is normal. No respiratory distress.      Breath sounds: No stridor. No wheezing.   Musculoskeletal:         General: Normal range of motion.      Cervical back: Normal range of motion and neck supple.   Skin:     General: Skin is warm and dry.   Neurological:      Mental Status: She is alert and oriented to person, place, and time.      Coordination: Coordination normal.      Gait: Gait normal.   Psychiatric:         Attention and Perception: Attention normal.         Mood and Affect: Mood normal.         Speech: Speech normal.         Behavior: Behavior normal. Behavior is cooperative.     SEPARATE PROCEDURE IN OFFICE:   Procedure: Removal of impacted cerumen, LEFT   Pre Procedure Diagnosis: Cerumen Impaction   Post Procedure Diagnosis: Cerumen Impaction   Verbal informed consent in regards to risk of trauma to ear canal, ear drum or hearing, discomfort during procedure and/or inability to remove cerumen impaction in one session or unforeseen events or complications.   No anesthesia.     Procedure in detail:   Ear canal visualized bilateral with appropriate size ear speculum utilizing Operating Head Binocular Otomicroscope   Utilizing the following: delicate alligator forceps used AS. The impacted cerumen of the ear canals was removed atraumatically. The TM and EAC were then inspected and found to be clear of wax. See description of TMs/EACs in PE above.   Complications:  No   Condition: Improved/Good    Negative [place] AU     Assessment and Plan     1. Dysequilibrium  -     Ambulatory referral/consult to ENT    2. Impacted cerumen of left ear        Discussion regarding dysequilibrium versus true vertigo: VNG is an inner ear test as part of comprehensive vestibular system diagnostic testing done in either [facility] or [facility], and indicated for those patients experiencing true vertigo but it is of little to no yield unless vertiginous. No evidence of any BPPV at this time.   Whereas dysequilibrium is typically multifactorial: anemia, anxiety, dehydration, hypo-/hyper-glycemia, hypo-/hyper-tension, thyroid, arrhthymia, side effects of medication, gait/mobility issues, age, headaches and other neurological issues, etc. Discuss with your PCP to determine stages of workup. Patient agrees to call me for any vertigo or if patient desires to proceed with VNG.            No follow-ups on file.

## 2025-07-16 RX ORDER — DULOXETIN HYDROCHLORIDE 30 MG/1
30 CAPSULE, DELAYED RELEASE ORAL DAILY
Qty: 90 CAPSULE | Refills: 1 | Status: SHIPPED | OUTPATIENT
Start: 2025-07-16

## 2025-07-16 NOTE — TELEPHONE ENCOUNTER
No care due was identified.  [facility] Embedded Care Due Messages. Reference number: [id number].   7/16/2025 5:08:14 AM CDT

## 2025-07-16 NOTE — TELEPHONE ENCOUNTER
Refill Decision Note   Gudelia Cerrato  is requesting a refill authorization.  Brief Assessment and Rationale for Refill:  Approve     Medication Therapy Plan:        Comments:     Note composed:12:48 PM 07/16/2025